# Patient Record
Sex: MALE | Race: WHITE | Employment: OTHER | URBAN - METROPOLITAN AREA
[De-identification: names, ages, dates, MRNs, and addresses within clinical notes are randomized per-mention and may not be internally consistent; named-entity substitution may affect disease eponyms.]

---

## 2021-10-29 ENCOUNTER — APPOINTMENT (OUTPATIENT)
Dept: CT IMAGING | Age: 71
End: 2021-10-29
Payer: MEDICARE

## 2021-10-29 ENCOUNTER — HOSPITAL ENCOUNTER (EMERGENCY)
Age: 71
Discharge: NURSING, REHAB OR LONG-TERM CARE | End: 2021-10-29
Attending: EMERGENCY MEDICINE
Payer: MEDICARE

## 2021-10-29 VITALS
DIASTOLIC BLOOD PRESSURE: 75 MMHG | SYSTOLIC BLOOD PRESSURE: 126 MMHG | HEART RATE: 70 BPM | WEIGHT: 200 LBS | BODY MASS INDEX: 27.89 KG/M2 | TEMPERATURE: 97.3 F | OXYGEN SATURATION: 98 % | RESPIRATION RATE: 15 BRPM

## 2021-10-29 DIAGNOSIS — K59.00 CONSTIPATION, UNSPECIFIED CONSTIPATION TYPE: Primary | ICD-10-CM

## 2021-10-29 LAB
ALBUMIN SERPL-MCNC: 3.4 G/DL (ref 3.5–5.2)
ALP BLD-CCNC: 135 U/L (ref 40–129)
ALT SERPL-CCNC: 21 U/L (ref 0–40)
ANION GAP SERPL CALCULATED.3IONS-SCNC: 10 MMOL/L (ref 7–16)
AST SERPL-CCNC: 38 U/L (ref 0–39)
BASOPHILS ABSOLUTE: 0.05 E9/L (ref 0–0.2)
BASOPHILS RELATIVE PERCENT: 0.6 % (ref 0–2)
BILIRUB SERPL-MCNC: 0.8 MG/DL (ref 0–1.2)
BUN BLDV-MCNC: 21 MG/DL (ref 6–23)
CALCIUM SERPL-MCNC: 8.9 MG/DL (ref 8.6–10.2)
CHLORIDE BLD-SCNC: 107 MMOL/L (ref 98–107)
CO2: 25 MMOL/L (ref 22–29)
CREAT SERPL-MCNC: 1.2 MG/DL (ref 0.7–1.2)
EOSINOPHILS ABSOLUTE: 0.46 E9/L (ref 0.05–0.5)
EOSINOPHILS RELATIVE PERCENT: 5.7 % (ref 0–6)
GFR AFRICAN AMERICAN: >60
GFR NON-AFRICAN AMERICAN: 60 ML/MIN/1.73
GLUCOSE BLD-MCNC: 156 MG/DL (ref 74–99)
HCT VFR BLD CALC: 38.6 % (ref 37–54)
HEMOGLOBIN: 12.4 G/DL (ref 12.5–16.5)
IMMATURE GRANULOCYTES #: 0.04 E9/L
IMMATURE GRANULOCYTES %: 0.5 % (ref 0–5)
LACTIC ACID: 1.8 MMOL/L (ref 0.5–2.2)
LYMPHOCYTES ABSOLUTE: 1.55 E9/L (ref 1.5–4)
LYMPHOCYTES RELATIVE PERCENT: 19 % (ref 20–42)
MCH RBC QN AUTO: 30.6 PG (ref 26–35)
MCHC RBC AUTO-ENTMCNC: 32.1 % (ref 32–34.5)
MCV RBC AUTO: 95.3 FL (ref 80–99.9)
MONOCYTES ABSOLUTE: 0.87 E9/L (ref 0.1–0.95)
MONOCYTES RELATIVE PERCENT: 10.7 % (ref 2–12)
NEUTROPHILS ABSOLUTE: 5.17 E9/L (ref 1.8–7.3)
NEUTROPHILS RELATIVE PERCENT: 63.5 % (ref 43–80)
PDW BLD-RTO: 13.9 FL (ref 11.5–15)
PLATELET # BLD: 207 E9/L (ref 130–450)
PMV BLD AUTO: 12.2 FL (ref 7–12)
POTASSIUM SERPL-SCNC: 4 MMOL/L (ref 3.5–5)
RBC # BLD: 4.05 E12/L (ref 3.8–5.8)
SODIUM BLD-SCNC: 142 MMOL/L (ref 132–146)
TOTAL PROTEIN: 6.1 G/DL (ref 6.4–8.3)
WBC # BLD: 8.1 E9/L (ref 4.5–11.5)

## 2021-10-29 PROCEDURE — 85025 COMPLETE CBC W/AUTO DIFF WBC: CPT

## 2021-10-29 PROCEDURE — 6360000004 HC RX CONTRAST MEDICATION: Performed by: RADIOLOGY

## 2021-10-29 PROCEDURE — 99284 EMERGENCY DEPT VISIT MOD MDM: CPT

## 2021-10-29 PROCEDURE — 74177 CT ABD & PELVIS W/CONTRAST: CPT

## 2021-10-29 PROCEDURE — 80053 COMPREHEN METABOLIC PANEL: CPT

## 2021-10-29 PROCEDURE — 2580000003 HC RX 258: Performed by: EMERGENCY MEDICINE

## 2021-10-29 PROCEDURE — 83605 ASSAY OF LACTIC ACID: CPT

## 2021-10-29 RX ORDER — POLYETHYLENE GLYCOL 3350 17 G/17G
17 POWDER, FOR SOLUTION ORAL 2 TIMES DAILY PRN
Qty: 527 G | Refills: 0 | Status: SHIPPED | OUTPATIENT
Start: 2021-10-29 | End: 2021-11-28

## 2021-10-29 RX ORDER — 0.9 % SODIUM CHLORIDE 0.9 %
1000 INTRAVENOUS SOLUTION INTRAVENOUS ONCE
Status: COMPLETED | OUTPATIENT
Start: 2021-10-29 | End: 2021-10-29

## 2021-10-29 RX ADMIN — IOHEXOL 50 ML: 240 INJECTION, SOLUTION INTRATHECAL; INTRAVASCULAR; INTRAVENOUS; ORAL at 11:40

## 2021-10-29 RX ADMIN — SODIUM CHLORIDE 1000 ML: 9 INJECTION, SOLUTION INTRAVENOUS at 12:27

## 2021-10-29 RX ADMIN — IOPAMIDOL 75 ML: 755 INJECTION, SOLUTION INTRAVENOUS at 11:40

## 2021-10-29 ASSESSMENT — ENCOUNTER SYMPTOMS
COUGH: 0
NAUSEA: 0
ABDOMINAL DISTENTION: 0
CHEST TIGHTNESS: 0
SORE THROAT: 0
ABDOMINAL PAIN: 1
CONSTIPATION: 1
DIARRHEA: 0
SHORTNESS OF BREATH: 0
WHEEZING: 0
BACK PAIN: 0
VOMITING: 0

## 2021-10-29 NOTE — ED NOTES
Pt was disempacted for very large amount  Hard stool post SSE per order Dr Carole Lockwood pt tolerated well some lisa blood on outside stool     [facility]  10/29/21 9611

## 2021-10-29 NOTE — ED NOTES
Pt awaits transfer to assist living no condition changes pt denies complaint, po snack and gatorade offered     [facility], [address] [facility]  10/29/21 5422

## 2021-10-29 NOTE — ED NOTES
Pt discharged by charge staff Nursing facility called with report     Mariana Wyman RN  10/29/21 1936

## 2021-10-29 NOTE — ED NOTES
Moved to rm 15 positioned left side  Ml instilled     Rj Rivera [facility]  10/29/21 3619

## 2021-10-29 NOTE — ED PROVIDER NOTES
Chief complaint:  Constipation    HPI history provided by the patient and report  Patient presents her complaining of 4 days of constipation with mild abdominal cramping and pain. No nausea vomiting or diarrhea. No fevers, sweats or chills. No hematuria dysuria. No back or flank pain. No chest pain, palpitations or shortness of breath. No treatment prior to arrival.  Nothing makes it better or worse. Review of Systems   Constitutional: Negative for chills, diaphoresis, fatigue and fever. HENT: Negative for congestion and sore throat. Respiratory: Negative for cough, chest tightness, shortness of breath and wheezing. Cardiovascular: Negative for chest pain, palpitations and leg swelling. Gastrointestinal: Positive for abdominal pain and constipation. Negative for abdominal distention, diarrhea, nausea and vomiting. Genitourinary: Negative for dysuria, flank pain, frequency and urgency. Musculoskeletal: Negative for arthralgias, back pain, gait problem, joint swelling, myalgias, neck pain and neck stiffness. Skin: Negative for rash and wound. Neurological: Negative for dizziness, seizures, syncope, weakness, light-headedness, numbness and headaches. Hematological: Negative for adenopathy. All other systems reviewed and are negative. Physical Exam  Vitals and nursing note reviewed. Constitutional:       General: He is not in acute distress. Appearance: He is well-developed. He is not ill-appearing, toxic-appearing or diaphoretic. HENT:      Head: Normocephalic and atraumatic. Eyes:      General: No scleral icterus. Pupils: Pupils are equal, round, and reactive to light. Cardiovascular:      Rate and Rhythm: Normal rate and regular rhythm. Heart sounds: Normal heart sounds. No murmur heard. Pulmonary:      Effort: Pulmonary effort is normal. No respiratory distress. Breath sounds: Normal breath sounds.  No stridor, decreased air movement or transmitted upper airway sounds. No decreased breath sounds, wheezing, rhonchi or rales. Chest:      Chest wall: No tenderness. Abdominal:      General: Bowel sounds are normal. There is no distension. Palpations: Abdomen is soft. Tenderness: There is no abdominal tenderness. There is no right CVA tenderness, left CVA tenderness, guarding or rebound. Comments: Soft, slightly protuberant with no gross distention. Mild general tenderness on deep palpation with no guarding, rebound tenderness or rigidity. Very minimal findings. Musculoskeletal:         General: No swelling, tenderness, deformity or signs of injury. Cervical back: Normal range of motion and neck supple. Right lower leg: No edema. Left lower leg: No edema. Skin:     General: Skin is warm and dry. Coloration: Skin is not cyanotic, jaundiced, mottled or pale. Findings: No erythema or rash. Neurological:      General: No focal deficit present. Mental Status: He is alert and oriented to person, place, and time. GCS: GCS eye subscore is 4. GCS verbal subscore is 5. GCS motor subscore is 6. Cranial Nerves: No cranial nerve deficit. Coordination: Coordination normal.          Procedures     MDM     ED Course as of Oct 29 1246   Fri Oct 29, 2021   1244 Patient resting comfortably no distress. Work-up results are discussed. [NC]      ED Course User Index  [NC] Karilyn Sandhoff, DO        ED Course as of Oct 29 1246   Fri Oct 29, 2021   1244 Patient resting comfortably no distress. Work-up results are discussed. [NC]      ED Course User Index  [NC] Carissa Prieto DO       --------------------------------------------- PAST HISTORY ---------------------------------------------  Past Medical History:  has a past medical history of Acquired hypothyroidism, Acute renal failure ([facility] [address] Arthritis, Asthma, History of cardiovascular stress test, and Hypertension.     Past Surgical History:  has a past surgical history that includes hernia repair (2005) and Cholecystectomy (7/17/2015). Social History:  reports that he quit smoking about 11 years ago. He has quit using smokeless tobacco. He reports that he does not drink alcohol and does not use drugs. Family History: family history includes Heart Disease in his mother; Heart Failure in his father and mother. The patients home medications have been reviewed.     Allergies: Penicillins and Sulfur    -------------------------------------------------- RESULTS -------------------------------------------------  Labs:  Results for orders placed or performed during the hospital encounter of 10/29/21   CBC Auto Differential   Result Value Ref Range    WBC 8.1 4.5 - 11.5 E9/L    RBC 4.05 3.80 - 5.80 E12/L    Hemoglobin 12.4 (L) 12.5 - 16.5 g/dL    Hematocrit 38.6 37.0 - 54.0 %    MCV 95.3 80.0 - 99.9 fL    MCH 30.6 26.0 - 35.0 pg    MCHC 32.1 32.0 - 34.5 %    RDW 13.9 11.5 - 15.0 fL    Platelets 190 832 - 348 E9/L    MPV 12.2 (H) 7.0 - 12.0 fL    Neutrophils % 63.5 43.0 - 80.0 %    Immature Granulocytes % 0.5 0.0 - 5.0 %    Lymphocytes % 19.0 (L) 20.0 - 42.0 %    Monocytes % 10.7 2.0 - 12.0 %    Eosinophils % 5.7 0.0 - 6.0 %    Basophils % 0.6 0.0 - 2.0 %    Neutrophils Absolute 5.17 1.80 - 7.30 E9/L    Immature Granulocytes # 0.04 E9/L    Lymphocytes Absolute 1.55 1.50 - 4.00 E9/L    Monocytes Absolute 0.87 0.10 - 0.95 E9/L    Eosinophils Absolute 0.46 0.05 - 0.50 E9/L    Basophils Absolute 0.05 0.00 - 0.20 E9/L   Comprehensive Metabolic Panel   Result Value Ref Range    Sodium 142 132 - 146 mmol/L    Potassium 4.0 3.5 - 5.0 mmol/L    Chloride 107 98 - 107 mmol/L    CO2 25 22 - 29 mmol/L    Anion Gap 10 7 - 16 mmol/L    Glucose 156 (H) 74 - 99 mg/dL    BUN 21 6 - 23 mg/dL    CREATININE 1.2 0.7 - 1.2 mg/dL    GFR Non-African American 60 >=60 mL/min/1.73    GFR African American >60     Calcium 8.9 8.6 - 10.2 mg/dL    Total Protein 6.1 (L) 6.4 - 8.3 g/dL hospitalization or inpatient management. They have remained hemodynamically stable throughout their entire ED visit and are stable for discharge with outpatient follow-up. The plan has been discussed in detail and they are aware of the specific conditions for emergent return, as well as the importance of follow-up. New Prescriptions    POLYETHYLENE GLYCOL (MIRALAX) 17 G PACKET    Take 17 g by mouth 2 times daily as needed for Constipation       Diagnosis:  1. Constipation, unspecified constipation type        Disposition:  Patient's disposition: Discharge to home  Patient's condition is stable.          [address] [facility], DO  10/29/21 1246

## 2021-11-02 ENCOUNTER — HOSPITAL ENCOUNTER (EMERGENCY)
Age: 71
Discharge: HOME | End: 2021-11-02
Attending: EMERGENCY MEDICINE
Payer: MEDICARE

## 2021-11-02 ENCOUNTER — APPOINTMENT (OUTPATIENT)
Dept: GENERAL RADIOLOGY | Age: 71
End: 2021-11-02
Payer: MEDICARE

## 2021-11-02 VITALS
RESPIRATION RATE: 18 BRPM | HEIGHT: 71 IN | SYSTOLIC BLOOD PRESSURE: 115 MMHG | OXYGEN SATURATION: 98 % | WEIGHT: 200 LBS | HEART RATE: 64 BPM | BODY MASS INDEX: 28 KG/M2 | DIASTOLIC BLOOD PRESSURE: 51 MMHG | TEMPERATURE: 97.9 F

## 2021-11-02 DIAGNOSIS — R55 NEAR SYNCOPE: Primary | ICD-10-CM

## 2021-11-02 LAB
ANION GAP SERPL CALCULATED.3IONS-SCNC: 8 MMOL/L (ref 7–16)
BUN BLDV-MCNC: 24 MG/DL (ref 6–23)
CALCIUM SERPL-MCNC: 8.8 MG/DL (ref 8.6–10.2)
CHLORIDE BLD-SCNC: 107 MMOL/L (ref 98–107)
CO2: 25 MMOL/L (ref 22–29)
CREAT SERPL-MCNC: 1.7 MG/DL (ref 0.7–1.2)
EKG ATRIAL RATE: 61 BPM
EKG P AXIS: 45 DEGREES
EKG P-R INTERVAL: 244 MS
EKG Q-T INTERVAL: 466 MS
EKG QRS DURATION: 78 MS
EKG QTC CALCULATION (BAZETT): 469 MS
EKG R AXIS: 2 DEGREES
EKG T AXIS: 49 DEGREES
EKG VENTRICULAR RATE: 61 BPM
GFR AFRICAN AMERICAN: 48
GFR NON-AFRICAN AMERICAN: 40 ML/MIN/1.73
GLUCOSE BLD-MCNC: 128 MG/DL (ref 74–99)
HCT VFR BLD CALC: 34.3 % (ref 37–54)
HEMOGLOBIN: 11.2 G/DL (ref 12.5–16.5)
MAGNESIUM: 2 MG/DL (ref 1.6–2.6)
MCH RBC QN AUTO: 30.9 PG (ref 26–35)
MCHC RBC AUTO-ENTMCNC: 32.7 % (ref 32–34.5)
MCV RBC AUTO: 94.8 FL (ref 80–99.9)
PDW BLD-RTO: 13.8 FL (ref 11.5–15)
PLATELET # BLD: 271 E9/L (ref 130–450)
PMV BLD AUTO: 12.5 FL (ref 7–12)
POTASSIUM SERPL-SCNC: 4.5 MMOL/L (ref 3.5–5)
RBC # BLD: 3.62 E12/L (ref 3.8–5.8)
SODIUM BLD-SCNC: 140 MMOL/L (ref 132–146)
TROPONIN, HIGH SENSITIVITY: 37 NG/L (ref 0–11)
TSH SERPL DL<=0.05 MIU/L-ACNC: 6.52 UIU/ML (ref 0.27–4.2)
WBC # BLD: 14.5 E9/L (ref 4.5–11.5)

## 2021-11-02 PROCEDURE — 93010 ELECTROCARDIOGRAM REPORT: CPT | Performed by: INTERNAL MEDICINE

## 2021-11-02 PROCEDURE — 84443 ASSAY THYROID STIM HORMONE: CPT

## 2021-11-02 PROCEDURE — 84484 ASSAY OF TROPONIN QUANT: CPT

## 2021-11-02 PROCEDURE — 99284 EMERGENCY DEPT VISIT MOD MDM: CPT

## 2021-11-02 PROCEDURE — 36415 COLL VENOUS BLD VENIPUNCTURE: CPT

## 2021-11-02 PROCEDURE — 2580000003 HC RX 258: Performed by: EMERGENCY MEDICINE

## 2021-11-02 PROCEDURE — 80048 BASIC METABOLIC PNL TOTAL CA: CPT

## 2021-11-02 PROCEDURE — 71045 X-RAY EXAM CHEST 1 VIEW: CPT

## 2021-11-02 PROCEDURE — 83735 ASSAY OF MAGNESIUM: CPT

## 2021-11-02 PROCEDURE — 93005 ELECTROCARDIOGRAM TRACING: CPT | Performed by: EMERGENCY MEDICINE

## 2021-11-02 PROCEDURE — 85027 COMPLETE CBC AUTOMATED: CPT

## 2021-11-02 RX ORDER — SODIUM CHLORIDE 0.9 % (FLUSH) 0.9 %
10 SYRINGE (ML) INJECTION PRN
Status: DISCONTINUED | OUTPATIENT
Start: 2021-11-02 | End: 2021-11-02 | Stop reason: HOSPADM

## 2021-11-02 RX ORDER — 0.9 % SODIUM CHLORIDE 0.9 %
1000 INTRAVENOUS SOLUTION INTRAVENOUS ONCE
Status: COMPLETED | OUTPATIENT
Start: 2021-11-02 | End: 2021-11-02

## 2021-11-02 RX ADMIN — SODIUM CHLORIDE 1000 ML: 9 INJECTION, SOLUTION INTRAVENOUS at 12:30

## 2021-11-02 ASSESSMENT — ENCOUNTER SYMPTOMS
SHORTNESS OF BREATH: 0
WHEEZING: 0
VOMITING: 0
COUGH: 0
DIARRHEA: 0
SORE THROAT: 0
EYE REDNESS: 0
EYE DISCHARGE: 0
SINUS PRESSURE: 0
EYE PAIN: 0
VISUAL CHANGE: 0
ABDOMINAL PAIN: 0
BACK PAIN: 0
NAUSEA: 0

## 2021-11-02 NOTE — ED PROVIDER NOTES
Head: Normocephalic and atraumatic. Eyes:      Pupils: Pupils are equal, round, and reactive to light. Cardiovascular:      Rate and Rhythm: Regular rhythm. Bradycardia present. Heart sounds: Normal heart sounds. No murmur heard. Pulmonary:      Effort: Pulmonary effort is normal. No respiratory distress. Breath sounds: Normal breath sounds. No wheezing or rales. Abdominal:      General: Bowel sounds are normal.      Palpations: Abdomen is soft. Tenderness: There is no abdominal tenderness. There is no guarding or rebound. Musculoskeletal:         General: No tenderness. Cervical back: Normal range of motion and neck supple. Comments: Some lower extremity muscle wasting noted. Skin:     General: Skin is warm and dry. Neurological:      Mental Status: He is alert and oriented to person, place, and time. Cranial Nerves: No cranial nerve deficit. Coordination: Coordination normal.          Procedures     MDM     EKG: This EKG is signed and interpreted by me. Rate: 61  Rhythm: Sinus  Interpretation: no acute changes, non-specific EKG, 1st degree AV block and nonacute anterior infarct with Q waves noted. Comparison: changes compared to previous EKG of 9/3/2017. Anterior infarct appears new since that time. --------------------------------------------- PAST HISTORY ---------------------------------------------  Past Medical History:  has a past medical history of Acquired hypothyroidism, Acute renal failure ([facility] [address] Arthritis, Asthma, History of cardiovascular stress test, and Hypertension. Past Surgical History:  has a past surgical history that includes hernia repair (2005) and Cholecystectomy (7/17/2015). Social History:  reports that he quit smoking about 11 years ago. He has quit using smokeless tobacco. He reports that he does not drink alcohol and does not use drugs.     Family History: family history includes Heart Disease in his mother; Heart Failure in his father and mother. The patients home medications have been reviewed. Allergies: Penicillins and Sulfur    -------------------------------------------------- RESULTS -------------------------------------------------  Labs:  Results for orders placed or performed during the hospital encounter of 22/26/58   Basic metabolic panel   Result Value Ref Range    Sodium 140 132 - 146 mmol/L    Potassium 4.5 3.5 - 5.0 mmol/L    Chloride 107 98 - 107 mmol/L    CO2 25 22 - 29 mmol/L    Anion Gap 8 7 - 16 mmol/L    Glucose 128 (H) 74 - 99 mg/dL    BUN 24 (H) 6 - 23 mg/dL    CREATININE 1.7 (H) 0.7 - 1.2 mg/dL    GFR Non-African American 40 >=60 mL/min/1.73    GFR African American 48     Calcium 8.8 8.6 - 10.2 mg/dL   CBC   Result Value Ref Range    WBC 14.5 (H) 4.5 - 11.5 E9/L    RBC 3.62 (L) 3.80 - 5.80 E12/L    Hemoglobin 11.2 (L) 12.5 - 16.5 g/dL    Hematocrit 34.3 (L) 37.0 - 54.0 %    MCV 94.8 80.0 - 99.9 fL    MCH 30.9 26.0 - 35.0 pg    MCHC 32.7 32.0 - 34.5 %    RDW 13.8 11.5 - 15.0 fL    Platelets 557 721 - 473 E9/L    MPV 12.5 (H) 7.0 - 12.0 fL   Troponin   Result Value Ref Range    Troponin, High Sensitivity 37 (H) 0 - 11 ng/L   Magnesium   Result Value Ref Range    Magnesium 2.0 1.6 - 2.6 mg/dL   TSH without Reflex   Result Value Ref Range    TSH 6.520 (H) 0.270 - 4.200 uIU/mL   EKG 12 Lead   Result Value Ref Range    Ventricular Rate 61 BPM    Atrial Rate 61 BPM    P-R Interval 244 ms    QRS Duration 78 ms    Q-T Interval 466 ms    QTc Calculation (Bazett) 469 ms    P Axis 45 degrees    R Axis 2 degrees    T Axis 49 degrees       Radiology:  XR CHEST PORTABLE   Final Result   No acute cardiopulmonary disease in the visualized chest.             ------------------------- NURSING NOTES AND VITALS REVIEWED ---------------------------  Date / Time Roomed:  11/2/2021 11:57 AM  ED Bed Assignment:  03/03    The nursing notes within the ED encounter and vital signs as below have been reviewed.    BP (!) 115/51   Pulse 64   Temp 97.9 °F (36.6 °C) (Oral)   Resp 18   Ht 5' 11\" (1.803 m)   Wt 200 lb (90.7 kg)   SpO2 98%   BMI 27.89 kg/m²   Oxygen Saturation Interpretation: Normal      ------------------------------------------ PROGRESS NOTES ------------------------------------------  1:26 PM EDT  Into discussed results. Patient sleeping. Easily awakens. He relates that he is anxious for discharge. He remains asymptomatic. I have spoken with the patient and discussed todays results, in addition to providing specific details for the plan of care and counseling regarding the diagnosis and prognosis. Their questions are answered at this time and they are agreeable with the plan. I discussed at length with them reasons for immediate return here for re evaluation. They will followup with their primary care physician by calling their office tomorrow. --------------------------------- ADDITIONAL PROVIDER NOTES ---------------------------------  At this time the patient is without objective evidence of an acute process requiring hospitalization or inpatient management. They have remained hemodynamically stable throughout their entire ED visit and are stable for discharge with outpatient follow-up. The plan has been discussed in detail and they are aware of the specific conditions for emergent return, as well as the importance of follow-up. New Prescriptions    No medications on file       Diagnosis:  1. Near syncope        Disposition:  Patient's disposition: Discharge to home  Patient's condition is stable.        Marlene Irwin DO  11/02/21 9870

## 2022-03-11 ENCOUNTER — HOSPITAL ENCOUNTER (OUTPATIENT)
Dept: CT IMAGING | Age: 72
Discharge: HOME | End: 2022-03-11
Payer: MEDICARE

## 2022-03-11 DIAGNOSIS — I69.351 HEMIPARESIS AFFECTING RIGHT SIDE AS LATE EFFECT OF CEREBROVASCULAR ACCIDENT (HCC): ICD-10-CM

## 2022-03-11 DIAGNOSIS — I63.312 CEREBRAL INFARCTION DUE TO THROMBOSIS OF LEFT MIDDLE CEREBRAL ARTERY (HCC): ICD-10-CM

## 2022-03-11 PROCEDURE — 70450 CT HEAD/BRAIN W/O DYE: CPT

## 2022-06-21 ENCOUNTER — HOSPITAL ENCOUNTER (INPATIENT)
Age: 72
LOS: 2 days | Discharge: NURSING, REHAB OR LONG-TERM CARE | DRG: 699 | End: 2022-06-24
Attending: STUDENT IN AN ORGANIZED HEALTH CARE EDUCATION/TRAINING PROGRAM | Admitting: INTERNAL MEDICINE
Payer: MEDICARE

## 2022-06-21 ENCOUNTER — APPOINTMENT (OUTPATIENT)
Dept: CT IMAGING | Age: 72
DRG: 699 | End: 2022-06-21
Payer: MEDICARE

## 2022-06-21 DIAGNOSIS — I10 ESSENTIAL HYPERTENSION: ICD-10-CM

## 2022-06-21 DIAGNOSIS — Z79.01 CHRONIC ANTICOAGULATION: ICD-10-CM

## 2022-06-21 DIAGNOSIS — R31.9 HEMATURIA, UNSPECIFIED TYPE: ICD-10-CM

## 2022-06-21 DIAGNOSIS — D64.9 ANEMIA, UNSPECIFIED TYPE: Primary | ICD-10-CM

## 2022-06-21 LAB
ANION GAP SERPL CALCULATED.3IONS-SCNC: 6 MMOL/L (ref 7–16)
BASOPHILS ABSOLUTE: 0.02 E9/L (ref 0–0.2)
BASOPHILS RELATIVE PERCENT: 0.2 % (ref 0–2)
BUN BLDV-MCNC: 32 MG/DL (ref 6–23)
CALCIUM SERPL-MCNC: 8.7 MG/DL (ref 8.6–10.2)
CHLORIDE BLD-SCNC: 101 MMOL/L (ref 98–107)
CO2: 28 MMOL/L (ref 22–29)
CREAT SERPL-MCNC: 1.2 MG/DL (ref 0.7–1.2)
EOSINOPHILS ABSOLUTE: 0.22 E9/L (ref 0.05–0.5)
EOSINOPHILS RELATIVE PERCENT: 2.3 % (ref 0–6)
GFR AFRICAN AMERICAN: >60
GFR NON-AFRICAN AMERICAN: 60 ML/MIN/1.73
GLUCOSE BLD-MCNC: 97 MG/DL (ref 74–99)
HCT VFR BLD CALC: 27.9 % (ref 37–54)
HEMOGLOBIN: 8.8 G/DL (ref 12.5–16.5)
IMMATURE GRANULOCYTES #: 0.05 E9/L
IMMATURE GRANULOCYTES %: 0.5 % (ref 0–5)
LYMPHOCYTES ABSOLUTE: 1.41 E9/L (ref 1.5–4)
LYMPHOCYTES RELATIVE PERCENT: 14.7 % (ref 20–42)
MCH RBC QN AUTO: 29.3 PG (ref 26–35)
MCHC RBC AUTO-ENTMCNC: 31.5 % (ref 32–34.5)
MCV RBC AUTO: 93 FL (ref 80–99.9)
MONOCYTES ABSOLUTE: 1.4 E9/L (ref 0.1–0.95)
MONOCYTES RELATIVE PERCENT: 14.6 % (ref 2–12)
NEUTROPHILS ABSOLUTE: 6.52 E9/L (ref 1.8–7.3)
NEUTROPHILS RELATIVE PERCENT: 67.7 % (ref 43–80)
PDW BLD-RTO: 14.4 FL (ref 11.5–15)
PLATELET # BLD: 176 E9/L (ref 130–450)
PMV BLD AUTO: 12.6 FL (ref 7–12)
POTASSIUM REFLEX MAGNESIUM: 5.3 MMOL/L (ref 3.5–5)
RBC # BLD: 3 E12/L (ref 3.8–5.8)
SODIUM BLD-SCNC: 135 MMOL/L (ref 132–146)
WBC # BLD: 9.6 E9/L (ref 4.5–11.5)

## 2022-06-21 PROCEDURE — 80048 BASIC METABOLIC PNL TOTAL CA: CPT

## 2022-06-21 PROCEDURE — 51702 INSERT TEMP BLADDER CATH: CPT

## 2022-06-21 PROCEDURE — 99285 EMERGENCY DEPT VISIT HI MDM: CPT

## 2022-06-21 PROCEDURE — 74176 CT ABD & PELVIS W/O CONTRAST: CPT

## 2022-06-21 PROCEDURE — 85025 COMPLETE CBC W/AUTO DIFF WBC: CPT

## 2022-06-21 ASSESSMENT — ENCOUNTER SYMPTOMS
DIARRHEA: 0
SHORTNESS OF BREATH: 0
BACK PAIN: 0
ABDOMINAL PAIN: 0
COUGH: 0
NAUSEA: 0
SORE THROAT: 0
EYE REDNESS: 0
WHEEZING: 0
SINUS PAIN: 0
EYE PAIN: 0
VOMITING: 0

## 2022-06-21 ASSESSMENT — PAIN - FUNCTIONAL ASSESSMENT: PAIN_FUNCTIONAL_ASSESSMENT: NONE - DENIES PAIN

## 2022-06-21 NOTE — Clinical Note
Discharge Plan[de-identified] Other/[facility])   Telemetry/Cardiac Monitoring Required?: No

## 2022-06-21 NOTE — LETTER
[place] Department Medicaid  CERTIFICATION OF NECESSITY  FOR NON-EMERGENCY TRANSPORTATION   BY GROUND AMBULANCE      Individual Information   1. Name: Amie Beebe 2. [place] Medicaid Billing Number:   3. Address: [place] [address]      Transportation Provider Information   4. Provider Name: [facility]   5. [place] Medicaid Provider Number:  National Provider Identifier (NPI):      Certification  7. Criteria:  During transport, this individual requires:  [x] Medical treatment or continuous     supervision by an EMT. [] The administration or regulation of oxygen by another person. [] Supervised protective restraint. 8. Period Beginning Date: 6/24/22   9. Length  [x] Not more than 3 day(s)  [] One Year     Additional Information Relevant to Certification   10. Comments or Explanations, If Necessary or Appropriate     Dependent x2, Inguinal hernia, History of stroke     Certifying Practitioner Information   11. Name of Practitioner: Dr Kecia Mix   12. [place] Medicaid Provider Number, If Applicable:  [address] Provider Identifier (NPI):      Signature Information   14. Date of Signature: 6/24/22 15. Name of Person Signing: Carl Witt    12. Signature and Professional Designation: Electronically signed by NIK Nicolas on 6/24/2022 at 1:55 PM       [facility] [address]  Rev. 7/2015    [facility] Date/Time: 6/21/2022 Nishant Mcfarland6 Account: [de-identified]    MRN: [id number]    Patient: Amie Beebe    Contact Serial #: [id number]      ENCOUNTER          Patient Class: I Private Enc?   No Unit RM BD: [facility] 6481/[id number]   Hospital Service: MED   Encounter DX: Chronic anticoagulation *   ADM Provider: Johnny Vela MD   Procedure:     ATT Provider: Neno Grider DO   REF Provider:        Admission DX: Chronic anticoagulation, Hematuria, Anemia, unspecified type, Hematuria, unspecified type and DX codes: Z79.01, R31.9, D64.9, R31.9      PATIENT                 Name: Suzanne Anaya : 1950 (80 yrs)   Address: [address] Sex: Male   City: [place] [address]         Marital Status: Single   Employer: RETIRED         Episcopal: [employer]   Primary Care Provider: Franchesca Castro MD         Primary Phone: [phone] [address]   Contact Name Legal Guardian? Relationship to Patient Home Phone Work Phone   1. Alejandro Partida  2. Fernando Voss No  No Other  Other [phone]              GUARANTOR            Guarantor: Ria Stone     : 1950   Address: [facility] Sex: [place]     [place] [address]     Relation to Patient: Self       Home Phone: [phone]   Guarantor ID: [id number]       Work Phone:     Guarantor Employer: RETIRED         Status: RETIRED      COVERAGE  PRIMARY INSURANCE   Payor: [employer] Plan: [employer]   Payor Address: [address],  [facility], [address] [place] [address]       Group Number:   Insurance Type: INDEMNITY   Subscriber Name: Deny Martin : 1950   Subscriber ID: [id number] Pat. Rel. to Sub: Self   SECONDARY INSURANCE   Payor: [address] Address:  [address], [address], [address] [place] [address]          Group Number: [id number] Type: INDEMNITY   Subscriber Name: Deny Martin : 1950   Subscriber ID: [id number] Pat.  Rel. to Sub: SELF           CSN: [id number]

## 2022-06-21 NOTE — ED PROVIDER NOTES
Chief Complaint   Patient presents with    Other     cath was pulled out two days ago with clotting and now just noticed by nursing hme        67-year-old gentleman with past medical history of hypertension, asthma, acute renal failure with chronic Devine catheter in place presenting today after his catheter was pulled out at the nursing facility. He states that he pulled it out yesterday, nothing made it better or worse, he is not sure why he did not, not associated with abdominal pain, penile pain, any other abnormal discharge. He is not experiencing nausea, vomiting, fever, chills, chest pain, shortness of breath. Nursing facility just realized today that it had been pulled out and sent to the emergency department for further evaluation. No other acute complaints. Review of Systems   Constitutional: Negative for chills and fever. HENT: Negative for ear pain, sinus pain and sore throat. Eyes: Negative for pain and redness. Respiratory: Negative for cough, shortness of breath and wheezing. Cardiovascular: Negative for chest pain. Gastrointestinal: Negative for abdominal pain, diarrhea, nausea and vomiting. Genitourinary: Negative for dysuria and flank pain. Devine catheter problem   Musculoskeletal: Negative for back pain and neck pain. Skin: Negative for rash. Neurological: Negative for seizures and headaches. Hematological: Negative for adenopathy. All other systems reviewed and are negative. Physical Exam  Vitals and nursing note reviewed. Constitutional:       General: He is not in acute distress. Appearance: He is well-developed. HENT:      Head: Normocephalic and atraumatic. Right Ear: External ear normal.      Left Ear: External ear normal.      Mouth/Throat:      Mouth: Mucous membranes are moist.      Pharynx: Oropharynx is clear. Eyes:      Pupils: Pupils are equal, round, and reactive to light.    Cardiovascular:      Rate and Rhythm: Normal rate and regular rhythm. Heart sounds: Normal heart sounds. No murmur heard. Pulmonary:      Effort: Pulmonary effort is normal.      Breath sounds: Normal breath sounds. Abdominal:      General: Bowel sounds are normal.      Palpations: Abdomen is soft. Tenderness: There is no abdominal tenderness. There is no guarding or rebound. Genitourinary:     Comments: Penile bleeding secondary to traumatic Devine catheter removal area intact  Musculoskeletal:         General: No tenderness. Cervical back: Normal range of motion and neck supple. Skin:     General: Skin is warm and dry. Neurological:      General: No focal deficit present. Mental Status: He is alert and oriented to person, place, and time. Procedures       MDM  Number of Diagnoses or Management Options  Anemia, unspecified type  Chronic anticoagulation  Hematuria, unspecified type  Diagnosis management comments: 36-year-old gentleman with past medical history of hypertension, asthma, acute renal failure with chronic Devine catheter in place presents today after his catheter was pulled out at the nursing facility. On arrival to emergency department he is hemodynamically stable. Area was cleansed and catheter was replaced, urinalysis obtained in addition to basic blood work. No abnormalities noted. CT scan demonstrated bladder herniation into inguinal region, on discussion with nursing home and patient's increasing fatigue, will be admitted for further evaluation management. Dr. Surinder Leon will admit the patient. ED Course as of 06/22/22 1036   Tue Jun 21, 2022   2131 Difficulty passing catheter, increasing swelling suprapubically. Will obtain CT abdomen/pelvis. [MM]   Wed Jun 22, 2022   0019 Spoke with staff from facility  Notes recently gone downhill recently; confused at times.    Dysphagia s/p CVA, CKD, chronic kidney disease, major depressive disorder, a. Fib, BPH, CAD, CHF, HTN.     [BB]   0022 The patient is on ASA, Xarelto 20mg. [BB]   [id number] Spoke with Dr. Cl Casarez, discussed the patient. He will admit the patient [BB]   0105 Peripheral Line Placement Procedure Note    Indication: Poor peripheral access, lack of vascular access    Consent: The patient provided verbal consent for this procedure. Procedure: The patient was positioned appropriately and the skin over the left arm was prepped with Chloraprep. A tourniquet was placed proximal to the vein's location and ultrasound was utilized to identify the vein. A 20 gauge angiocath was inserted into the vein with ultrasound guidance, with the catheter being visualized as it was advanced within the vein. Blood was obtained from the venous access site if needed and the site was secured with a leur lock and a tegaderm adhesive bandage. The patient tolerated the procedure well. Complications: None    Procedure performed by Lawrence Almonte at 6/22/2022 at 1:05 AM   [BB]      ED Course User Index  [BB] Lawrence Almonte DO  [MM] Meredith Mccabe DO        ED Course as of 06/22/22 1036   Tue Jun 21, 2022   2131 Difficulty passing catheter, increasing swelling suprapubically. Will obtain CT abdomen/pelvis. [MM]   Wed Jun 22, 2022   0019 Spoke with staff from facility  Notes recently gone downhill recently; confused at times. Dysphagia s/p CVA, CKD, chronic kidney disease, major depressive disorder, a. Fib, BPH, CAD, CHF, HTN.     [BB]   0022 The patient is on ASA, Xarelto 20mg. [BB]   [id number] Spoke with Dr. Cl Casarez, discussed the patient. He will admit the patient [BB]   0105 Peripheral Line Placement Procedure Note    Indication: Poor peripheral access, lack of vascular access    Consent: The patient provided verbal consent for this procedure. Procedure: The patient was positioned appropriately and the skin over the left arm was prepped with Chloraprep. A tourniquet was placed proximal to the vein's location and ultrasound was utilized to identify the vein.  A 20 gauge angiocath was inserted into the vein with ultrasound guidance, with the catheter being visualized as it was advanced within the vein. Blood was obtained from the venous access site if needed and the site was secured with a leur lock and a tegaderm adhesive bandage. The patient tolerated the procedure well. Complications: None    Procedure performed by Elieser Linton at 6/22/2022 at 1:05 AM   [BB]      ED Course User Index  [BB] Elieser Linton   [MM] Annmarie Jules DO       --------------------------------------------- PAST HISTORY ---------------------------------------------  Past Medical History:  has a past medical history of Acquired hypothyroidism, Acute renal failure ([address] Arthritis, Asthma, History of cardiovascular stress test, and Hypertension. Past Surgical History:  has a past surgical history that includes hernia repair (2005) and Cholecystectomy (7/17/2015). Social History:  reports that he quit smoking about 11 years ago. He has quit using smokeless tobacco. He reports that he does not drink alcohol and does not use drugs. Family History: family history includes Heart Disease in his mother; Heart Failure in his father and mother. The patients home medications have been reviewed.     Allergies: Elemental sulfur and Penicillins    -------------------------------------------------- RESULTS -------------------------------------------------    LABS:  Results for orders placed or performed during the hospital encounter of 06/21/22   Urinalysis with Microscopic   Result Value Ref Range    Color, UA RED (A) Straw/Yellow    Clarity, UA TURBID (A) Clear    Glucose, Ur Negative Negative mg/dL    Bilirubin Urine Negative Negative    Ketones, Urine Negative Negative mg/dL    Specific Gravity, UA 1.020 1.005 - 1.030    Blood, Urine LARGE (A) Negative    pH, UA 7.0 5.0 - 9.0    Protein, UA >=300 (A) Negative mg/dL    Urobilinogen, Urine 0.2 <2.0 E.U./dL    Nitrite, Urine Negative Negative Leukocyte Esterase, Urine MODERATE (A) Negative    WBC, UA 10-20 (A) 0 - 5 /HPF    RBC, UA PACKED 0 - 2 /HPF    Epithelial Cells, UA RARE /HPF    Bacteria, UA MODERATE (A) None Seen /HPF   CBC with Auto Differential   Result Value Ref Range    WBC 9.6 4.5 - 11.5 E9/L    RBC 3.00 (L) 3.80 - 5.80 E12/L    Hemoglobin 8.8 (L) 12.5 - 16.5 g/dL    Hematocrit 27.9 (L) 37.0 - 54.0 %    MCV 93.0 80.0 - 99.9 fL    MCH 29.3 26.0 - 35.0 pg    MCHC 31.5 (L) 32.0 - 34.5 %    RDW 14.4 11.5 - 15.0 fL    Platelets 637 685 - 121 E9/L    MPV 12.6 (H) 7.0 - 12.0 fL    Neutrophils % 67.7 43.0 - 80.0 %    Immature Granulocytes % 0.5 0.0 - 5.0 %    Lymphocytes % 14.7 (L) 20.0 - 42.0 %    Monocytes % 14.6 (H) 2.0 - 12.0 %    Eosinophils % 2.3 0.0 - 6.0 %    Basophils % 0.2 0.0 - 2.0 %    Neutrophils Absolute 6.52 1.80 - 7.30 E9/L    Immature Granulocytes # 0.05 E9/L    Lymphocytes Absolute 1.41 (L) 1.50 - 4.00 E9/L    Monocytes Absolute 1.40 (H) 0.10 - 0.95 E9/L    Eosinophils Absolute 0.22 0.05 - 0.50 E9/L    Basophils Absolute 0.02 0.00 - 0.20 X8/J   Basic Metabolic Panel w/ Reflex to MG   Result Value Ref Range    Sodium 135 132 - 146 mmol/L    Potassium reflex Magnesium 5.3 (H) 3.5 - 5.0 mmol/L    Chloride 101 98 - 107 mmol/L    CO2 28 22 - 29 mmol/L    Anion Gap 6 (L) 7 - 16 mmol/L    Glucose 97 74 - 99 mg/dL    BUN 32 (H) 6 - 23 mg/dL    CREATININE 1.2 0.7 - 1.2 mg/dL    GFR Non-African American 60 >=60 mL/min/1.73    GFR African American >60     Calcium 8.7 8.6 - 10.2 mg/dL   Potassium   Result Value Ref Range    Potassium 4.6 3.5 - 5.0 mmol/L       RADIOLOGY:  CT ABDOMEN PELVIS WO CONTRAST Additional Contrast? None   Final Result   1. The Devine catheter and the balloon terminate in the bladder dome, which is   herniated in the left inguinal canal.  The herniation of the bladder into the   left inguinal canal is chronic and is seen as far back as at least 09/07/2015. 2. Prominent bilateral renal calculi.   No evidence of hydronephrosis. 3. Interval development of calcified consolidations in the dependent aspects   of the lower lobes, which may be due to infectious/inflammatory process or   barium aspiration. 4. Fat containing right inguinal hernia. No evidence of fat strangulation. 5. Severe distal colonic diverticulosis without diverticulitis. 6. Diffuse bony sclerosis of unclear etiology. Clinical correlation is   needed. RECOMMENDATIONS:   Unavailable             ------------------------- NURSING NOTES AND VITALS REVIEWED ---------------------------  Date / Time Roomed:  6/21/2022  6:47 PM  ED Bed Assignment:  [id number]    The nursing notes within the ED encounter and vital signs as below have been reviewed. Patient Vitals for the past 24 hrs:   BP Temp Temp src Pulse Resp SpO2 Height Weight   06/22/22 0845 101/60 98.8 °F (37.1 °C) Oral (!) 103 20 95 % -- --   06/22/22 0256 108/63 98.9 °F (37.2 °C) Oral 94 16 95 % -- --   06/22/22 0018 -- -- -- 89 -- 95 % -- --   06/21/22 2248 118/66 -- -- 85 -- 94 % -- --   06/21/22 2244 118/66 -- -- 86 16 95 % 5' 11\" (1.803 m) 150 lb (68 kg)   06/21/22 1856 121/64 98.1 °F (36.7 °C) -- 86 18 95 % -- --       Oxygen Saturation Interpretation: Normal    ------------------------------------------ PROGRESS NOTES ------------------------------------------  Re-evaluation(s):  Time: 2100  Patients symptoms show no change  Repeat physical examination is not changed    Counseling:  I have spoken with the patient and discussed todays results, in addition to providing specific details for the plan of care and counseling regarding the diagnosis and prognosis. Their questions are answered at this time and they are agreeable with the plan of admission.    --------------------------------- ADDITIONAL PROVIDER NOTES ---------------------------------  Consultations:  Time: 1299. Spoke with Dr. Marisol So.  Discussed case. They will admit the patient.   This patient's ED course included: a personal history and physicial examination, re-evaluation prior to disposition, multiple bedside re-evaluations and complex medical decision making and emergency management    This patient has remained hemodynamically stable during their ED course. Diagnosis:  1. Anemia, unspecified type    2. Chronic anticoagulation    3. Hematuria, unspecified type        Disposition:  Patient's disposition: Admit to med/surg floor  Patient's condition is stable.            Loan Mccloud DO  Resident  06/22/22 1037

## 2022-06-22 PROBLEM — I63.9 CVA (CEREBRAL VASCULAR ACCIDENT) (HCC): Status: ACTIVE | Noted: 2022-06-22

## 2022-06-22 PROBLEM — Z86.73 HISTORY OF STROKE: Status: ACTIVE | Noted: 2022-06-22

## 2022-06-22 PROBLEM — R31.0 GROSS HEMATURIA: Status: ACTIVE | Noted: 2022-06-22

## 2022-06-22 PROBLEM — R31.9 HEMATURIA: Status: ACTIVE | Noted: 2022-06-22

## 2022-06-22 PROBLEM — I10 HYPERTENSION: Status: ACTIVE | Noted: 2022-06-22

## 2022-06-22 LAB
BACTERIA: ABNORMAL /HPF
BILIRUBIN URINE: NEGATIVE
BLOOD, URINE: ABNORMAL
CLARITY: ABNORMAL
COLOR: ABNORMAL
EPITHELIAL CELLS, UA: ABNORMAL /HPF
GLUCOSE URINE: NEGATIVE MG/DL
HCT VFR BLD CALC: 26.5 % (ref 37–54)
HEMOGLOBIN: 8.2 G/DL (ref 12.5–16.5)
KETONES, URINE: NEGATIVE MG/DL
LEUKOCYTE ESTERASE, URINE: ABNORMAL
NITRITE, URINE: NEGATIVE
PH UA: 7 (ref 5–9)
POTASSIUM SERPL-SCNC: 4.6 MMOL/L (ref 3.5–5)
PROTEIN UA: >=300 MG/DL
RBC UA: ABNORMAL /HPF (ref 0–2)
SPECIFIC GRAVITY UA: 1.02 (ref 1–1.03)
UROBILINOGEN, URINE: 0.2 E.U./DL
WBC UA: ABNORMAL /HPF (ref 0–5)

## 2022-06-22 PROCEDURE — 85014 HEMATOCRIT: CPT

## 2022-06-22 PROCEDURE — 1200000000 HC SEMI PRIVATE

## 2022-06-22 PROCEDURE — 85018 HEMOGLOBIN: CPT

## 2022-06-22 PROCEDURE — 84132 ASSAY OF SERUM POTASSIUM: CPT

## 2022-06-22 PROCEDURE — 51702 INSERT TEMP BLADDER CATH: CPT

## 2022-06-22 PROCEDURE — 36415 COLL VENOUS BLD VENIPUNCTURE: CPT

## 2022-06-22 PROCEDURE — 6370000000 HC RX 637 (ALT 250 FOR IP): Performed by: INTERNAL MEDICINE

## 2022-06-22 PROCEDURE — 99223 1ST HOSP IP/OBS HIGH 75: CPT | Performed by: INTERNAL MEDICINE

## 2022-06-22 PROCEDURE — 81001 URINALYSIS AUTO W/SCOPE: CPT

## 2022-06-22 PROCEDURE — 2580000003 HC RX 258: Performed by: INTERNAL MEDICINE

## 2022-06-22 PROCEDURE — 36410 VNPNXR 3YR/> PHY/QHP DX/THER: CPT

## 2022-06-22 PROCEDURE — 2580000003 HC RX 258: Performed by: STUDENT IN AN ORGANIZED HEALTH CARE EDUCATION/TRAINING PROGRAM

## 2022-06-22 RX ORDER — PROMETHAZINE HYDROCHLORIDE 25 MG/1
12.5 TABLET ORAL EVERY 6 HOURS PRN
Status: DISCONTINUED | OUTPATIENT
Start: 2022-06-22 | End: 2022-06-24 | Stop reason: HOSPADM

## 2022-06-22 RX ORDER — DIVALPROEX SODIUM 250 MG/1
250 TABLET, DELAYED RELEASE ORAL 3 TIMES DAILY
COMMUNITY

## 2022-06-22 RX ORDER — ACETAMINOPHEN 650 MG/20.3ML
650 SUSPENSION ORAL EVERY 6 HOURS PRN
COMMUNITY
End: 2022-09-25 | Stop reason: ALTCHOICE

## 2022-06-22 RX ORDER — ASPIRIN 81 MG/1
81 TABLET, CHEWABLE ORAL DAILY
COMMUNITY

## 2022-06-22 RX ORDER — FERROUS SULFATE 220 (44)/5
220 ELIXIR ORAL DAILY
COMMUNITY

## 2022-06-22 RX ORDER — NITROGLYCERIN 0.4 MG/1
0.4 TABLET SUBLINGUAL EVERY 5 MIN PRN
Status: DISCONTINUED | OUTPATIENT
Start: 2022-06-22 | End: 2022-06-24 | Stop reason: HOSPADM

## 2022-06-22 RX ORDER — 0.9 % SODIUM CHLORIDE 0.9 %
1000 INTRAVENOUS SOLUTION INTRAVENOUS ONCE
Status: DISCONTINUED | OUTPATIENT
Start: 2022-06-22 | End: 2022-06-22

## 2022-06-22 RX ORDER — ATORVASTATIN CALCIUM 40 MG/1
40 TABLET, FILM COATED ORAL DAILY
COMMUNITY

## 2022-06-22 RX ORDER — NITROGLYCERIN 0.4 MG/1
0.4 TABLET SUBLINGUAL EVERY 5 MIN PRN
COMMUNITY

## 2022-06-22 RX ORDER — DIVALPROEX SODIUM 125 MG/1
125 CAPSULE, COATED PELLETS ORAL 2 TIMES DAILY
Status: DISCONTINUED | OUTPATIENT
Start: 2022-06-22 | End: 2022-06-24 | Stop reason: HOSPADM

## 2022-06-22 RX ORDER — ONDANSETRON 2 MG/ML
4 INJECTION INTRAMUSCULAR; INTRAVENOUS EVERY 6 HOURS PRN
Status: DISCONTINUED | OUTPATIENT
Start: 2022-06-22 | End: 2022-06-24 | Stop reason: HOSPADM

## 2022-06-22 RX ORDER — ATORVASTATIN CALCIUM 40 MG/1
40 TABLET, FILM COATED ORAL DAILY
Status: DISCONTINUED | OUTPATIENT
Start: 2022-06-22 | End: 2022-06-24 | Stop reason: HOSPADM

## 2022-06-22 RX ORDER — ACETAMINOPHEN 325 MG/1
650 TABLET ORAL EVERY 6 HOURS PRN
Status: DISCONTINUED | OUTPATIENT
Start: 2022-06-22 | End: 2022-06-24 | Stop reason: HOSPADM

## 2022-06-22 RX ORDER — POLYETHYLENE GLYCOL 3350 17 G/17G
17 POWDER, FOR SOLUTION ORAL DAILY PRN
Status: DISCONTINUED | OUTPATIENT
Start: 2022-06-22 | End: 2022-06-24 | Stop reason: HOSPADM

## 2022-06-22 RX ORDER — AMLODIPINE BESYLATE 10 MG/1
10 TABLET ORAL DAILY
Status: DISCONTINUED | OUTPATIENT
Start: 2022-06-22 | End: 2022-06-24 | Stop reason: HOSPADM

## 2022-06-22 RX ORDER — 0.9 % SODIUM CHLORIDE 0.9 %
500 INTRAVENOUS SOLUTION INTRAVENOUS ONCE
Status: DISCONTINUED | OUTPATIENT
Start: 2022-06-22 | End: 2022-06-24 | Stop reason: HOSPADM

## 2022-06-22 RX ORDER — ACETAMINOPHEN 650 MG/1
650 SUPPOSITORY RECTAL EVERY 6 HOURS PRN
Status: DISCONTINUED | OUTPATIENT
Start: 2022-06-22 | End: 2022-06-24 | Stop reason: HOSPADM

## 2022-06-22 RX ORDER — CHOLECALCIFEROL (VITAMIN D3) 125 MCG
5 CAPSULE ORAL NIGHTLY PRN
COMMUNITY

## 2022-06-22 RX ORDER — OLANZAPINE 5 MG/1
5 TABLET ORAL NIGHTLY
COMMUNITY

## 2022-06-22 RX ORDER — SODIUM CHLORIDE 9 MG/ML
INJECTION, SOLUTION INTRAVENOUS CONTINUOUS
Status: DISCONTINUED | OUTPATIENT
Start: 2022-06-22 | End: 2022-06-22

## 2022-06-22 RX ORDER — METOPROLOL SUCCINATE 50 MG/1
50 TABLET, EXTENDED RELEASE ORAL DAILY
Status: DISCONTINUED | OUTPATIENT
Start: 2022-06-22 | End: 2022-06-22

## 2022-06-22 RX ORDER — METOPROLOL SUCCINATE 50 MG/1
50 TABLET, EXTENDED RELEASE ORAL DAILY
Status: ON HOLD | COMMUNITY
End: 2022-06-24 | Stop reason: HOSPADM

## 2022-06-22 RX ORDER — FAMOTIDINE 20 MG/1
20 TABLET, FILM COATED ORAL 2 TIMES DAILY
Status: DISCONTINUED | OUTPATIENT
Start: 2022-06-22 | End: 2022-06-24 | Stop reason: HOSPADM

## 2022-06-22 RX ORDER — DOCUSATE SODIUM 100 MG/1
100 CAPSULE, LIQUID FILLED ORAL 2 TIMES DAILY
COMMUNITY

## 2022-06-22 RX ORDER — MECOBALAMIN 5000 MCG
5 TABLET,DISINTEGRATING ORAL NIGHTLY PRN
Status: DISCONTINUED | OUTPATIENT
Start: 2022-06-22 | End: 2022-06-24 | Stop reason: HOSPADM

## 2022-06-22 RX ORDER — OLANZAPINE 5 MG/1
5 TABLET ORAL NIGHTLY
Status: DISCONTINUED | OUTPATIENT
Start: 2022-06-22 | End: 2022-06-24 | Stop reason: HOSPADM

## 2022-06-22 RX ADMIN — DIVALPROEX SODIUM 125 MG: 125 CAPSULE, COATED PELLETS ORAL at 22:44

## 2022-06-22 RX ADMIN — ATORVASTATIN CALCIUM 40 MG: 40 TABLET, FILM COATED ORAL at 09:12

## 2022-06-22 RX ADMIN — OLANZAPINE 5 MG: 5 TABLET, FILM COATED ORAL at 22:33

## 2022-06-22 RX ADMIN — AMLODIPINE BESYLATE 10 MG: 10 TABLET ORAL at 09:12

## 2022-06-22 RX ADMIN — Medication 5 MG: at 22:31

## 2022-06-22 RX ADMIN — METOPROLOL TARTRATE 25 MG: 25 TABLET, FILM COATED ORAL at 09:12

## 2022-06-22 RX ADMIN — SODIUM CHLORIDE: 9 INJECTION, SOLUTION INTRAVENOUS at 12:04

## 2022-06-22 RX ADMIN — FAMOTIDINE 20 MG: 20 TABLET ORAL at 09:12

## 2022-06-22 RX ADMIN — DIVALPROEX SODIUM 125 MG: 125 CAPSULE, COATED PELLETS ORAL at 09:12

## 2022-06-22 RX ADMIN — FAMOTIDINE 20 MG: 20 TABLET ORAL at 22:31

## 2022-06-22 RX ADMIN — ACETAMINOPHEN 650 MG: 325 TABLET ORAL at 22:32

## 2022-06-22 ASSESSMENT — PAIN SCALES - GENERAL
PAINLEVEL_OUTOF10: 0
PAINLEVEL_OUTOF10: 5
PAINLEVEL_OUTOF10: 9

## 2022-06-22 ASSESSMENT — PAIN - FUNCTIONAL ASSESSMENT
PAIN_FUNCTIONAL_ASSESSMENT: PREVENTS OR INTERFERES SOME ACTIVE ACTIVITIES AND ADLS
PAIN_FUNCTIONAL_ASSESSMENT: PREVENTS OR INTERFERES SOME ACTIVE ACTIVITIES AND ADLS

## 2022-06-22 ASSESSMENT — PAIN DESCRIPTION - DESCRIPTORS
DESCRIPTORS: DISCOMFORT;ACHING
DESCRIPTORS: DISCOMFORT;ACHING

## 2022-06-22 ASSESSMENT — PAIN DESCRIPTION - ORIENTATION
ORIENTATION: DISTAL
ORIENTATION: DISTAL

## 2022-06-22 ASSESSMENT — PAIN DESCRIPTION - LOCATION
LOCATION: PENIS
LOCATION: PENIS

## 2022-06-22 NOTE — H&P
[address] [place], [place] [address]ist Group   HISTORY AND PHYSICAL EXAM      AUTHOR: Tiki Pathak MD PATIENT NAME: Yudelka Stone   DATE: 2022 MRN: [id number], : 1950   Primary Care Physician: Farrah Leigh MD     CHIEF COMPLAINT / REASON FOR ADMISSION:  Gross hematuria, Bleeding  HPI:   This is a 80 y.o. male  has a past medical history of Acquired hypothyroidism, Acute renal failure ([address] Arthritis, Asthma, History of cardiovascular stress test, and Hypertension. presented with Gross hematuria, Bleeding for last few days prior to arrival to the hospital.  He has a chronic Devine but his catheter was pulled out at yesterday. Nursing facility just realized today that it had been pulled out and sent to the emergency department for further evaluation. The patient was seen and examined at bedside, appears alert and awake with no acute distress and is able to answer simple  questions. On direct questioning, patient denied any  resting ongoing chest pain, resting SOB, hemoptysis, productive cough, fever, ongoing palpitation, active abdominal pain, hematemesis, rectal bleeding, nelda, any other  and GI complaints and any new focal neuro deficits . ROS:  Pertinent positives and negatives are noted in the HPI, all other systems are reviewed and negative    PMH:  Past Medical History:   Diagnosis Date    Acquired hypothyroidism 3/9/2016    Acute renal failure (HCC)     Arthritis     Asthma     History of cardiovascular stress test 3/23/2016    lexiscan    Hypertension        Surgical History:  Past Surgical History:   Procedure Laterality Date    CHOLECYSTECTOMY  2015   [facility]PA[facility]  2005    Dr Zora Ellis        Medications Prior to Admission:    Prior to Admission medications    Medication Sig Start Date End Date Taking?  Authorizing Provider   acetaminophen (TYLENOL) 650 MG/20.3ML SUSP Take 650 mg by mouth every 6 hours as needed for Pain   Yes Historical Provider, MD   aspirin 81 MG chewable tablet 81 mg by PEG Tube route daily   Yes Historical Provider, MD   atorvastatin (LIPITOR) 40 MG tablet 40 mg by PEG Tube route daily   Yes Historical Provider, MD   divalproex (DEPAKOTE SPRINKLE) 125 MG capsule Take 125 mg by mouth 2 times daily Give 2 capsules via peg tube three times a day   Yes Historical Provider, MD   docusate sodium (COLACE) 100 MG capsule 100 mg by PEG Tube route 2 times daily   Yes Historical Provider, MD   melatonin 3 MG TABS tablet 5 mg by PEG Tube route nightly as needed   Yes Historical Provider, MD   metoprolol succinate (TOPROL XL) 50 MG extended release tablet Take 50 mg by mouth daily Give 1 tablet via peg tube two times a day   Yes Historical Provider, MD   nitroGLYCERIN (NITROSTAT) 0.4 MG SL tablet Place 0.4 mg under the tongue every 5 minutes as needed for Chest pain up to max of 3 total doses. If no relief after 1 dose, call 911.    Yes Historical Provider, MD   OLANZapine (ZYPREXA) 5 MG tablet 5 mg by PEG Tube route nightly   Yes Historical Provider, MD   rivaroxaban (XARELTO) 20 MG TABS tablet 20 mg by PEG Tube route daily (with breakfast)   Yes Historical Provider, MD   ferrous sulfate 220 (44 Fe) MG/5ML solution Take 220 mg by mouth daily Patient take 7.5mL via peg tube   Yes Historical Provider, MD   hydrochlorothiazide (HYDRODIURIL) 25 MG tablet Take 12.5 mg by mouth daily  Patient not taking: Reported on 6/22/2022    Historical Provider, MD   sucralfate (CARAFATE) 1 GM tablet Take 1 tablet by mouth 4 times daily  Patient not taking: Reported on 6/22/2022 9/3/17   Cara Prieto DO   famotidine (PEPCID) 20 MG tablet Take 1 tablet by mouth 2 times daily for 10 days 9/3/17 9/13/17  Cara rPieto DO   lisinopril (PRINIVIL;ZESTRIL) 10 MG tablet Take 1 tablet by mouth daily  Patient not taking: Reported on 6/22/2022 7/14/16   Winifred Greenberg DO   Dextromethorphan-Guaifenesin (MUCINEX DM MAXIMUM STRENGTH)  MG TB12 Take 1 tablet by mouth 2 times daily as needed  Patient not taking: Reported on 6/22/2022 6/13/16   Linda Martinez DO   levothyroxine (SYNTHROID) 75 MCG tablet Take 1 tablet by mouth daily  Patient not taking: Reported on 6/22/2022 5/17/16   Linda Martinez DO   ferrous sulfate 325 (65 FE) MG tablet Take 1 tablet by mouth 2 times daily (with meals)  Patient not taking: Reported on 6/22/2022 5/17/16   Linda Martinez DO   pantoprazole (PROTONIX) 40 MG tablet Take 1 tablet by mouth 2 times daily (before meals)  Patient not taking: Reported on 6/22/2022 5/17/16   Linda Martinez DO   pyridoxine (B-6) 50 MG tablet Take 1 tablet by mouth daily  Patient not taking: Reported on 6/22/2022 5/17/16   Linda Martinez DO   amLODIPine (NORVASC) 10 MG tablet Take 1 tablet by mouth daily  Patient taking differently: 2.5 mg by PEG Tube route daily  5/17/16   Linda Martinez DO   loratadine (CLARITIN) 10 MG tablet Take 1 tablet by mouth daily as needed  Patient not taking: Reported on 6/22/2022 4/12/16   Linda Martinez DO   carbamide peroxide (DEBROX) 6.5 % otic solution Place 5 drops into both ears 4 times daily as needed (ear wax)  Patient not taking: Reported on 6/22/2022 4/12/16   Linda Martinez DO   chlorthalidone (HYGROTON) 25 MG tablet Take 25 mg by mouth daily   Patient not taking: Reported on 6/22/2022 2/27/16   Historical Provider, MD       Allergies:    Elemental sulfur and Penicillins    Social History:    reports that he quit smoking about 11 years ago. He has quit using smokeless tobacco. He reports that he does not drink alcohol and does not use drugs. Family History:   family history includes Heart Disease in his mother; Heart Failure in his father and mother.       PHYSICAL EXAM:  Vitals:  /63   Pulse 94   Temp 98.9 °F (37.2 °C) (Oral)   Resp 16   Ht 5' 11\" (1.803 m)   Wt 150 lb (68 kg)   SpO2 95%   BMI 20.92 kg/m²   GENERAL: No acute distress, Alert and awake, Afebrile, Appears tired and weak otherwise hemodynamically stable at present. HEENT: PERRLA, no icterus. OP clear and no exudates. NECK: Supple  no carotid/ophthalmic bruits, JVD None. RESPIRATORY:  Bilateral equal vesicular breath sound with no wheezing. Lung bases are clear. HEART: No tachycardia at bedside and regular rhythm. Normal S1 and S2, No S3 or S4 is audible. No pulsation, thrills, murmur or friction rubs. ABDOMEN: Soft, nondistended, nontender. No hepatomegaly or splenomegaly. No CVA tenderness on the both sides. Bowel sound is present. Devine is draining reddish, bloody urine. Bladder irrigation  EXTREMITIES: All peripheral pulses are present. No calf tenderness or swelling. No pedal edema is present. [facility] NEUROLOGY: Alert and awake. No new focal neuro deficit. Still have residual dysphasia from prior CVA  LABS:  Recent Labs     06/21/22  2243   WBC 9.6   RBC 3.00*   HGB 8.8*   HCT 27.9*   MCV 93.0   MCH 29.3   MCHC 31.5*   RDW 14.4      MPV 12.6*     Recent Labs     06/21/22 2243 06/22/22  0142     --    K 5.3* 4.6     --    CO2 28  --    BUN 32*  --    CREATININE 1.2  --    GLUCOSE 97  --    CALCIUM 8.7  --      No results for input(s): POCGLU in the last 72 hours.   Results for orders placed or performed during the hospital encounter of 06/21/22   CBC with Auto Differential   Result Value Ref Range    WBC 9.6 4.5 - 11.5 E9/L    RBC 3.00 (L) 3.80 - 5.80 E12/L    Hemoglobin 8.8 (L) 12.5 - 16.5 g/dL    Hematocrit 27.9 (L) 37.0 - 54.0 %    MCV 93.0 80.0 - 99.9 fL    MCH 29.3 26.0 - 35.0 pg    MCHC 31.5 (L) 32.0 - 34.5 %    RDW 14.4 11.5 - 15.0 fL    Platelets 838 541 - 481 E9/L    MPV 12.6 (H) 7.0 - 12.0 fL    Neutrophils % 67.7 43.0 - 80.0 %    Immature Granulocytes % 0.5 0.0 - 5.0 %    Lymphocytes % 14.7 (L) 20.0 - 42.0 %    Monocytes % 14.6 (H) 2.0 - 12.0 %    Eosinophils % 2.3 0.0 - 6.0 %    Basophils % 0.2 0.0 - 2.0 %    Neutrophils Absolute 6.52 1.80 - 7.30 E9/L    Immature Granulocytes # 0.05 E9/L    Lymphocytes Absolute 1.41 (L) 1.50 - 4.00 E9/L    Monocytes Absolute 1.40 (H) 0.10 - 0.95 E9/L    Eosinophils Absolute 0.22 0.05 - 0.50 E9/L    Basophils Absolute 0.02 0.00 - 0.20 D8/L   Basic Metabolic Panel w/ Reflex to MG   Result Value Ref Range    Sodium 135 132 - 146 mmol/L    Potassium reflex Magnesium 5.3 (H) 3.5 - 5.0 mmol/L    Chloride 101 98 - 107 mmol/L    CO2 28 22 - 29 mmol/L    Anion Gap 6 (L) 7 - 16 mmol/L    Glucose 97 74 - 99 mg/dL    BUN 32 (H) 6 - 23 mg/dL    CREATININE 1.2 0.7 - 1.2 mg/dL    GFR Non-African American 60 >=60 mL/min/1.73    GFR African American >60     Calcium 8.7 8.6 - 10.2 mg/dL   Potassium   Result Value Ref Range    Potassium 4.6 3.5 - 5.0 mmol/L     ED Course as of 06/22/22 0623   Tue Jun 21, 2022 2131 Difficulty passing catheter, increasing swelling suprapubically. Will obtain CT abdomen/pelvis. [MM]   Wed Jun 22, 2022   0019 Spoke with staff from facility  Notes recently gone downhill recently; confused at times. Dysphagia s/p CVA, CKD, chronic kidney disease, major depressive disorder, a. Fib, BPH, CAD, CHF, HTN.     [BB]   0022 The patient is on ASA, Xarelto 20mg. [BB]   [id number] Spoke with Dr. Cl Casarez, discussed the patient. He will admit the patient [BB]   0105 Peripheral Line Placement Procedure Note    Indication: Poor peripheral access, lack of vascular access    Consent: The patient provided verbal consent for this procedure. Procedure: The patient was positioned appropriately and the skin over the left arm was prepped with Chloraprep. A tourniquet was placed proximal to the vein's location and ultrasound was utilized to identify the vein. A 20 gauge angiocath was inserted into the vein with ultrasound guidance, with the catheter being visualized as it was advanced within the vein. Blood was obtained from the venous access site if needed and the site was secured with a leur lock and a tegaderm adhesive bandage. The patient tolerated the procedure well.     Complications: None    Procedure performed by Norberto Tinsley at 6/22/2022 at 1:05 AM   [BB]      ED Course User Index  [BB] Norberto Tinsley DO  [MM] Merry Gordon DO     Radiology: CT ABDOMEN PELVIS WO CONTRAST Additional Contrast? None    Result Date: 6/21/2022  EXAMINATION: CT OF THE ABDOMEN AND PELVIS WITHOUT CONTRAST 6/21/2022 9:43 pm TECHNIQUE: CT of the abdomen and pelvis was performed without the administration of intravenous contrast. Multiplanar reformatted images are provided for review. Automated exposure control, iterative reconstruction, and/or weight based adjustment of the mA/kV was utilized to reduce the radiation dose to as low as reasonably achievable. COMPARISON: CT abdomen and pelvis with contrast, 10/29/2021 HISTORY: ORDERING SYSTEM PROVIDED HISTORY: pelvic swelling, excessive hematuria, urinary retention TECHNOLOGIST PROVIDED HISTORY: Reason for exam:->pelvic swelling, excessive hematuria, urinary retention Additional Contrast?->None FINDINGS: Lower Chest: Since 10/29/2021, there is interval development calcified pulmonary consolidations in the dependent aspects of the lower lobes, which may be the result of a prior infectious/inflammatory process or contrast aspiration. The heart is normal in size. Small anterior pericardial effusion. No pleural effusion. Organs: Liver: Unremarkable. Gallbladder: Surgically absent. Pancreas: Unremarkable. Spleen:  Unremarkable. Adrenals: Unremarkable. Kidneys: The kidneys are mildly atrophic with multiple prominent cysts. Multiple prominent intrarenal calculi are also seen with the largest in the left kidney 2.1 x 0.6 cm extends from the major calices into the renal pelvis. No hydronephrosis. GI/Bowel: Severe pancolonic diverticulosis without go to GI bowel severe distal colonic diverticulosis without diverticulitis. No bowel wall thickening or obstruction. Normal appendix. Gastrostomy tube in situ. Pelvis:  The Devine catheter extends into the bladder in terminates in the bladder dome, which is herniated into the left inguinal canal.  The prostate gland is mildly enlarged. Fat containing right inguinal hernia. No evidence of fat strangulation. Peritoneum/Retroperitoneum: Mild calcified atherosclerosis is seen in the aorta. No aneurysm. No lymphadenopathy. No free air or free fluid is seen. Bones/Soft Tissues: The visualized bones are intact without fracture or focal lesion. Bones are diffusely sclerotic. 1. The Shipman catheter and the balloon terminate in the bladder dome, which is herniated in the left inguinal canal.  The herniation of the bladder into the left inguinal canal is chronic and is seen as far back as at least 09/07/2015. 2. Prominent bilateral renal calculi. No evidence of hydronephrosis. 3. Interval development of calcified consolidations in the dependent aspects of the lower lobes, which may be due to infectious/inflammatory process or barium aspiration. 4. Fat containing right inguinal hernia. No evidence of fat strangulation. 5. Severe distal colonic diverticulosis without diverticulitis. 6. Diffuse bony sclerosis of unclear etiology. Clinical correlation is needed. RECOMMENDATIONS: Unavailable     ASSESSMENT:    Present on Admission:   Hematuria   Inguinal hernia   Anemia   CVA (cerebral vascular accident) ([facility] [address] [facility]    PLAN:  # Urethral bleeding after traumatic removal of shipman at NH with gross hematuria + Anemia   Vitals stable   Hb is 8.8   Shipman inserted in ED - draining bloody urine   CT showed chronic bladder herniation + Shipman tip in bladder dome - no features of strangulations   Bladder irrigation   Stop Xarelto   Urology consult  # H/o CVA   Hold Xarelto and Aspirin for now   No new neuro deficit    Continue Depakote  # Hypertension   Currently better controlled  Will resume home medications as needed.   Monitor vitals and adjust BP meds as needed  # Rest of the chronic medical problems are stable and will be managed with appropriately with home medications, placed nursing communication order to verify home medications before giving them to the patient. # Diet: On TF/PEG feeding  # IVF's: Yes  # Fall Precaution: Yes  # Disposition: Home/primary residence   # Code Status: Full code by default  # DVT Prophylaxis : SC Heparin or SC Lovenox as on chart  The patient at bedside was counseled about clinical status, laboratory/imaging results, diagnoses, medication side effects, risk, and treatment plan, all questions were answered to patient's satisfaction and verbalized understanding      SIGNATURE: Verónica Whiteside MD PATIENT NAME: [address] [place] [address] #: Hospitalist on call MRN: [id number]     Disclaimer: Portions of this note may have been generated using Dragon voice recognition software. Reasonable efforts were made to correct any dictation errors that resulted due to the programming of this software but some may still be present.

## 2022-06-22 NOTE — PROGRESS NOTES
[address] [place] [address] Hospitalist   Progress Note    Admitting Date and Time: 6/21/2022  6:47 PM  Admit Dx: Chronic anticoagulation [Z79.01]  Hematuria [R31.9]  Anemia, unspecified type [D64.9]  Hematuria, unspecified type [R31.9]    Subjective/interval history:    Pt admitted from nursing facility with gross hematuria after accidental traumatic removal of Devine catheter. Hemoglobin did drop significantly, but not enough to warrant blood transfusion. Dates he feels well and denies any complaints at this time. ROS: denies fever, chills, cp, sob, n/v, HA unless stated above.  sodium chloride  500 mL IntraVENous Once    amLODIPine  10 mg Oral Daily    famotidine  20 mg Oral BID    atorvastatin  40 mg PEG Tube Daily    divalproex  125 mg Oral BID    OLANZapine  5 mg PEG Tube Nightly    metoprolol tartrate  25 mg PEG Tube BID     promethazine, 12.5 mg, Q6H PRN   Or  ondansetron, 4 mg, Q6H PRN  polyethylene glycol, 17 g, Daily PRN  acetaminophen, 650 mg, Q6H PRN   Or  acetaminophen, 650 mg, Q6H PRN  melatonin, 5 mg, Nightly PRN  nitroGLYCERIN, 0.4 mg, Q5 Min PRN         Objective:    /60   Pulse (!) 103   Temp 98.8 °F (37.1 °C) (Oral)   Resp 20   Ht 5' 11\" (1.803 m)   Wt 150 lb (68 kg)   SpO2 95%   BMI 20.92 kg/m²   General Appearance: alert and oriented to person, place and time and in no acute distress  Skin: warm and dry, mild pallor  Head: normocephalic and atraumatic  Eyes: Mild conjunctival pallor, pupils equal, round, and reactive to light, extraocular eye movements intact  Neck: neck supple and non tender without mass   Pulmonary/Chest: Nonlabored on room air.   Clear to auscultation bilaterally  Cardiovascular: normal rate, normal S1 and S2 and no carotid bruits  Abdomen: soft, non-tender, non-distended, normal bowel sounds, no masses or organomegaly  Extremities: no cyanosis, no clubbing and no edema  Neurologic: Moderate dysarthria present  : Devine catheter in place with software. Every effort was made to ensure accuracy; however, inadvertent computerized transcription errors may be present.      Electronically signed by Jose Luis Fregoso DO on 6/22/2022 at 4:13 PM

## 2022-06-22 NOTE — CONSULTS
6/22/2022 8:28 AM  Service: Urology  Group: [facility] (Ihsan/Suzette/Chaitanya)    Armin Bolton  [id number]     Chief Complaint:    Hematuria     History of Present Illness: The patient is a 80 y.o. 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 The catheter has been replaced at the nursing facility. When he presented to the ER he was noted to have gross hematuria. His catheter was removed and replaced with a Simplastic Shipman catheter. He is on Xarelto. Currently his shipman is draining dark cherry colored urine. He is a poor historian. I am not sure if he sees a Urologist.       Past Medical History:   Diagnosis Date    Acquired hypothyroidism 3/9/2016    Acute renal failure ([facility] [address]     Arthritis     Asthma     History of cardiovascular stress test 3/23/2016    lexiscan    Hypertension          Past Surgical History:   Procedure Laterality Date    CHOLECYSTECTOMY  7/17/2015    HERNIA REPAIR  2005    Dr Felipe Base        Medications Prior to Admission:    Medications Prior to Admission: acetaminophen (TYLENOL) 650 MG/20.3ML SUSP, Take 650 mg by mouth every 6 hours as needed for Pain  aspirin 81 MG chewable tablet, 81 mg by PEG Tube route daily  atorvastatin (LIPITOR) 40 MG tablet, 40 mg by PEG Tube route daily  divalproex (DEPAKOTE SPRINKLE) 125 MG capsule, Take 125 mg by mouth 2 times daily Give 2 capsules via peg tube three times a day  docusate sodium (COLACE) 100 MG capsule, 100 mg by PEG Tube route 2 times daily  melatonin 3 MG TABS tablet, 5 mg by PEG Tube route nightly as needed  metoprolol succinate (TOPROL XL) 50 MG extended release tablet, Take 50 mg by mouth daily Give 1 tablet via peg tube two times a day  nitroGLYCERIN (NITROSTAT) 0.4 MG SL tablet, Place 0.4 mg under the tongue every 5 minutes as needed for Chest pain up to max of 3 total doses. If no relief after 1 dose, call 911.   OLANZapine (ZYPREXA) 5 MG tablet, 5 mg by PEG Tube route nightly  rivaroxaban (XARELTO) 20 MG TABS tablet, 20 mg by PEG Tube route daily (with breakfast)  ferrous sulfate 220 (44 Fe) MG/5ML solution, Take 220 mg by mouth daily Patient take 7.5mL via peg tube  hydrochlorothiazide (HYDRODIURIL) 25 MG tablet, Take 12.5 mg by mouth daily (Patient not taking: Reported on 6/22/2022)  sucralfate (CARAFATE) 1 GM tablet, Take 1 tablet by mouth 4 times daily (Patient not taking: Reported on 6/22/2022)  famotidine (PEPCID) 20 MG tablet, Take 1 tablet by mouth 2 times daily for 10 days  lisinopril (PRINIVIL;ZESTRIL) 10 MG tablet, Take 1 tablet by mouth daily (Patient not taking: Reported on 6/22/2022)  Dextromethorphan-Guaifenesin (MUCINEX DM MAXIMUM STRENGTH)  MG TB12, Take 1 tablet by mouth 2 times daily as needed (Patient not taking: Reported on 6/22/2022)  levothyroxine (SYNTHROID) 75 MCG tablet, Take 1 tablet by mouth daily (Patient not taking: Reported on 6/22/2022)  ferrous sulfate 325 (65 FE) MG tablet, Take 1 tablet by mouth 2 times daily (with meals) (Patient not taking: Reported on 6/22/2022)  pantoprazole (PROTONIX) 40 MG tablet, Take 1 tablet by mouth 2 times daily (before meals) (Patient not taking: Reported on 6/22/2022)  pyridoxine (B-6) 50 MG tablet, Take 1 tablet by mouth daily (Patient not taking: Reported on 6/22/2022)  amLODIPine (NORVASC) 10 MG tablet, Take 1 tablet by mouth daily (Patient taking differently: 2.5 mg by PEG Tube route daily )  loratadine (CLARITIN) 10 MG tablet, Take 1 tablet by mouth daily as needed (Patient not taking: Reported on 6/22/2022)  carbamide peroxide (DEBROX) 6.5 % otic solution, Place 5 drops into both ears 4 times daily as needed (ear wax) (Patient not taking: Reported on 6/22/2022)  chlorthalidone (HYGROTON) 25 MG tablet, Take 25 mg by mouth daily  (Patient not taking: Reported on 6/22/2022)    Allergies:    Elemental sulfur and Penicillins    Social History:    reports that he quit smoking about 11 years ago. He has quit using smokeless tobacco. He reports that he does not drink alcohol and does not use drugs. Family History:   Non-contributory to this Urological problem  family history includes Heart Disease in his mother; Heart Failure in his father and mother. Review of Systems:  Unable to obtain due to confusion     Physical Exam:     Vitals:  /63   Pulse 94   Temp 98.9 °F (37.2 °C) (Oral)   Resp 16   Ht 5' 11\" (1.803 m)   Wt 150 lb (68 kg)   SpO2 95%   BMI 20.92 kg/m²     General:  Awake and alert, confused   HEENT:  Normocephalic, atraumatic. Lungs:  Respirations symmetric and non-labored. Abdomen:  soft, nontender, no masses  Extremities:  No clubbing, cyanosis, or edema  Skin:  Warm and dry, no open lesions or rashes  Neuro: There are no motor or sensory deficits in the 4 quadrant extremities   Rectal: deferred  Genitourinary: Devine draining dark cherry urine with blood at meatus     Labs:     Recent Labs     06/21/22  2243   WBC 9.6   RBC 3.00*   HGB 8.8*   HCT 27.9*   MCV 93.0   MCH 29.3   MCHC 31.5*   RDW 14.4      MPV 12.6*         Recent Labs     06/21/22  2243   CREATININE 1.2       Lab Results   Component Value Date    PSA 1.43 01/26/2016       Imaging:   Narrative   EXAMINATION:   CT OF THE ABDOMEN AND PELVIS WITHOUT CONTRAST 6/21/2022 9:43 pm       TECHNIQUE:   CT of the abdomen and pelvis was performed without the administration of   intravenous contrast. Multiplanar reformatted images are provided for review.    Automated exposure control, iterative reconstruction, and/or weight based   adjustment of the mA/kV was utilized to reduce the radiation dose to as low   as reasonably achievable.       COMPARISON:   CT abdomen and pelvis with contrast, 10/29/2021       HISTORY:   ORDERING SYSTEM PROVIDED HISTORY: pelvic swelling, excessive hematuria,   urinary retention   TECHNOLOGIST PROVIDED HISTORY:   Reason for exam:->pelvic swelling, excessive hematuria, urinary retention   Additional Contrast?->None       FINDINGS:   Lower Chest: Since 10/29/2021, there is interval development calcified   pulmonary consolidations in the dependent aspects of the lower lobes, which   may be the result of a prior infectious/inflammatory process or contrast   aspiration.  The heart is normal in size.  Small anterior pericardial   effusion.  No pleural effusion.       Organs:       Liver: Unremarkable.       Gallbladder: Surgically absent.       Pancreas: Unremarkable.       Spleen:  Unremarkable.       Adrenals: Unremarkable.       Kidneys: The kidneys are mildly atrophic with multiple prominent cysts. Multiple prominent intrarenal calculi are also seen with the largest in the   left kidney 2.1 x 0.6 cm extends from the major calices into the renal   pelvis.  No hydronephrosis.       GI/Bowel: Severe pancolonic diverticulosis without go to GI bowel severe   distal colonic diverticulosis without diverticulitis.  No bowel wall   thickening or obstruction.  Normal appendix.  Gastrostomy tube in situ.       Pelvis: The Devine catheter extends into the bladder in terminates in the   bladder dome, which is herniated into the left inguinal canal.  The prostate   gland is mildly enlarged.  Fat containing right inguinal hernia.  No evidence   of fat strangulation.       Peritoneum/Retroperitoneum: Mild calcified atherosclerosis is seen in the   aorta. No aneurysm.  No lymphadenopathy. No free air or free fluid is seen.       Bones/Soft Tissues: The visualized bones are intact without fracture or focal   lesion.  Bones are diffusely sclerotic.           Impression   1. The Devine catheter and the balloon terminate in the bladder dome, which is   herniated in the left inguinal canal.  The herniation of the bladder into the   left inguinal canal is chronic and is seen as far back as at least 09/07/2015. 2. Prominent bilateral renal calculi.  No evidence of hydronephrosis.    3. Interval development of calcified consolidations in the dependent aspects   of the lower lobes, which may be due to infectious/inflammatory process or   barium aspiration. 4. Fat containing right inguinal hernia.  No evidence of fat strangulation. 5. Severe distal colonic diverticulosis without diverticulitis. 6. Diffuse bony sclerosis of unclear etiology.  Clinical correlation is   needed.       RECOMMENDATIONS:   Unavailable                     Assessment/plan:  Gross Hematuria related to traumatic shipman removal in the setting of Xarelto  Bilateral renal cysts   Bilateral Non-Obstructing Renal Calculi   Bilateral inguinal hernias    His shipman was hand irrigated for moderate amount of clot retrieval with approximately 1500cc of saline. Urine returned to an christi in color. CTAP reviewed, his bladder is herniated into his left inguinal canal/hernia. This does appear chronic. He has bilateral renal cysts that appear benign and bilateral non-obstructing renal calculi as well.    His creatinine is stable and appears baseline  Cont to watch the hemoglobin  Transfusions per primary   Cont the shipman catheter  Irrigate every 2 hours and PRN  Hold on CBI unless unable to clear with manual irrigations  Send urine cytology  Will follow         Electronically signed by MARIELY Matthews CNP on 6/22/2022 at 8:28 AM   I have interviewed and examined the pt amd agree with the above note and plan

## 2022-06-22 NOTE — PLAN OF CARE
Problem: Safety - Adult  Goal: Free from fall injury  6/22/2022 1200 by Truman Alanis RN  Outcome: Progressing     Problem: Skin/Tissue Integrity  Goal: Absence of new skin breakdown  Description: 1. Monitor for areas of redness and/or skin breakdown  2. Assess vascular access sites hourly  3. Every 4-6 hours minimum:  Change oxygen saturation probe site  4. Every 4-6 hours:  If on nasal continuous positive airway pressure, respiratory therapy assess nares and determine need for appliance change or resting period.   6/22/2022 1200 by Truman Alanis RN  Outcome: Progressing     Problem: ABCDS Injury Assessment  Goal: Absence of physical injury  Outcome: Progressing

## 2022-06-22 NOTE — CARE COORDINATION
Patient was admitted from [facility] LIYA spoke with Marixa at [facility], she states patient is a long term care bedhold there and can return on discharge. NO PRECERT needed but will need a RAPID COVID test day of discharge. Confirmed with patient, plan is back to [facility] Will follow for discharge back to Nursing facility when medically stable.

## 2022-06-23 LAB
ALBUMIN SERPL-MCNC: 2.4 G/DL (ref 3.5–5.2)
ALP BLD-CCNC: 63 U/L (ref 40–129)
ALT SERPL-CCNC: <5 U/L (ref 0–40)
ANION GAP SERPL CALCULATED.3IONS-SCNC: 6 MMOL/L (ref 7–16)
AST SERPL-CCNC: 15 U/L (ref 0–39)
BASOPHILS ABSOLUTE: 0.04 E9/L (ref 0–0.2)
BASOPHILS RELATIVE PERCENT: 0.5 % (ref 0–2)
BILIRUB SERPL-MCNC: 0.3 MG/DL (ref 0–1.2)
BUN BLDV-MCNC: 37 MG/DL (ref 6–23)
CALCIUM SERPL-MCNC: 8.3 MG/DL (ref 8.6–10.2)
CHLORIDE BLD-SCNC: 102 MMOL/L (ref 98–107)
CO2: 28 MMOL/L (ref 22–29)
CREAT SERPL-MCNC: 1.3 MG/DL (ref 0.7–1.2)
EOSINOPHILS ABSOLUTE: 0.55 E9/L (ref 0.05–0.5)
EOSINOPHILS RELATIVE PERCENT: 7.5 % (ref 0–6)
GFR AFRICAN AMERICAN: >60
GFR NON-AFRICAN AMERICAN: 54 ML/MIN/1.73
GLUCOSE BLD-MCNC: 110 MG/DL (ref 74–99)
HCT VFR BLD CALC: 26 % (ref 37–54)
HCT VFR BLD CALC: 26.1 % (ref 37–54)
HEMOGLOBIN: 7.8 G/DL (ref 12.5–16.5)
HEMOGLOBIN: 8 G/DL (ref 12.5–16.5)
IMMATURE GRANULOCYTES #: 0.02 E9/L
IMMATURE GRANULOCYTES %: 0.3 % (ref 0–5)
LYMPHOCYTES ABSOLUTE: 1.26 E9/L (ref 1.5–4)
LYMPHOCYTES RELATIVE PERCENT: 17.1 % (ref 20–42)
MCH RBC QN AUTO: 29.2 PG (ref 26–35)
MCHC RBC AUTO-ENTMCNC: 30.8 % (ref 32–34.5)
MCV RBC AUTO: 94.9 FL (ref 80–99.9)
MONOCYTES ABSOLUTE: 1.34 E9/L (ref 0.1–0.95)
MONOCYTES RELATIVE PERCENT: 18.2 % (ref 2–12)
NEUTROPHILS ABSOLUTE: 4.14 E9/L (ref 1.8–7.3)
NEUTROPHILS RELATIVE PERCENT: 56.4 % (ref 43–80)
PDW BLD-RTO: 14.7 FL (ref 11.5–15)
PLATELET # BLD: 172 E9/L (ref 130–450)
PMV BLD AUTO: 12.1 FL (ref 7–12)
POTASSIUM REFLEX MAGNESIUM: 4.3 MMOL/L (ref 3.5–5)
RBC # BLD: 2.74 E12/L (ref 3.8–5.8)
SODIUM BLD-SCNC: 136 MMOL/L (ref 132–146)
TOTAL PROTEIN: 5.8 G/DL (ref 6.4–8.3)
WBC # BLD: 7.4 E9/L (ref 4.5–11.5)

## 2022-06-23 PROCEDURE — 85014 HEMATOCRIT: CPT

## 2022-06-23 PROCEDURE — 51702 INSERT TEMP BLADDER CATH: CPT

## 2022-06-23 PROCEDURE — 36415 COLL VENOUS BLD VENIPUNCTURE: CPT

## 2022-06-23 PROCEDURE — 6370000000 HC RX 637 (ALT 250 FOR IP): Performed by: INTERNAL MEDICINE

## 2022-06-23 PROCEDURE — 80053 COMPREHEN METABOLIC PANEL: CPT

## 2022-06-23 PROCEDURE — 99232 SBSQ HOSP IP/OBS MODERATE 35: CPT | Performed by: INTERNAL MEDICINE

## 2022-06-23 PROCEDURE — 1200000000 HC SEMI PRIVATE

## 2022-06-23 PROCEDURE — 85025 COMPLETE CBC W/AUTO DIFF WBC: CPT

## 2022-06-23 PROCEDURE — 85018 HEMOGLOBIN: CPT

## 2022-06-23 RX ADMIN — METOPROLOL TARTRATE 25 MG: 25 TABLET, FILM COATED ORAL at 20:10

## 2022-06-23 RX ADMIN — OLANZAPINE 5 MG: 5 TABLET, FILM COATED ORAL at 20:10

## 2022-06-23 RX ADMIN — ATORVASTATIN CALCIUM 40 MG: 40 TABLET, FILM COATED ORAL at 08:30

## 2022-06-23 RX ADMIN — FAMOTIDINE 20 MG: 20 TABLET ORAL at 08:30

## 2022-06-23 RX ADMIN — METOPROLOL TARTRATE 25 MG: 25 TABLET, FILM COATED ORAL at 08:30

## 2022-06-23 RX ADMIN — FAMOTIDINE 20 MG: 20 TABLET ORAL at 20:10

## 2022-06-23 RX ADMIN — AMLODIPINE BESYLATE 10 MG: 10 TABLET ORAL at 08:30

## 2022-06-23 RX ADMIN — DIVALPROEX SODIUM 125 MG: 125 CAPSULE, COATED PELLETS ORAL at 08:30

## 2022-06-23 RX ADMIN — DIVALPROEX SODIUM 125 MG: 125 CAPSULE, COATED PELLETS ORAL at 20:10

## 2022-06-23 ASSESSMENT — PAIN SCALES - GENERAL: PAINLEVEL_OUTOF10: 0

## 2022-06-23 NOTE — PROGRESS NOTES
[address] [place] [address] Hospitalist   Progress Note    Admitting Date and Time: 6/21/2022  6:47 PM  Admit Dx: Chronic anticoagulation [Z79.01]  Hematuria [R31.9]  Anemia, unspecified type [D64.9]  Hematuria, unspecified type [R31.9]    Subjective/interval history:    6/22: Pt admitted from nursing facility with gross hematuria after accidental traumatic removal of Devine catheter. Hemoglobin did drop significantly, but not enough to warrant blood transfusion. Dates he feels well and denies any complaints at this time. 6/23: Patient notes that his IV is irritating him, otherwise feels well. From Devine catheter now with minimal blood. Hemoglobin did drop slightly to 7.8. ROS: denies fever, chills, cp, sob, n/v, HA unless stated above.  sodium chloride  500 mL IntraVENous Once    amLODIPine  10 mg Oral Daily    famotidine  20 mg Oral BID    atorvastatin  40 mg PEG Tube Daily    divalproex  125 mg Oral BID    OLANZapine  5 mg PEG Tube Nightly    metoprolol tartrate  25 mg PEG Tube BID     promethazine, 12.5 mg, Q6H PRN   Or  ondansetron, 4 mg, Q6H PRN  polyethylene glycol, 17 g, Daily PRN  acetaminophen, 650 mg, Q6H PRN   Or  acetaminophen, 650 mg, Q6H PRN  melatonin, 5 mg, Nightly PRN  nitroGLYCERIN, 0.4 mg, Q5 Min PRN         Objective:    BP (!) 119/57   Pulse 92   Temp 98.1 °F (36.7 °C) (Oral)   Resp 18   Ht 5' 11\" (1.803 m)   Wt 150 lb (68 kg)   SpO2 96%   BMI 20.92 kg/m²   General Appearance: alert and oriented to person, place and time and in no acute distress  Skin: warm and dry, mild pallor  Head: normocephalic and atraumatic  Eyes: Mild conjunctival pallor, pupils equal, round, and reactive to light, extraocular eye movements intact  Neck: neck supple and non tender without mass   Pulmonary/Chest: Nonlabored on room air.   Clear to auscultation bilaterally  Cardiovascular: normal rate, normal S1 and S2 and no carotid bruits  Abdomen: soft, non-tender, non-distended, normal bowel sounds, no masses or organomegaly  Extremities: no cyanosis, no clubbing and no edema  Neurologic: Moderate dysarthria present  : Devine catheter in place with mildly blood-tinged urine    Recent Labs     06/21/22  2243 06/22/22  0142 06/23/22  0739     --  136   K 5.3* 4.6 4.3     --  102   CO2 28  --  28   BUN 32*  --  37*   CREATININE 1.2  --  1.3*   GLUCOSE 97  --  110*   CALCIUM 8.7  --  8.3*       Recent Labs     06/23/22  0739   ALKPHOS 63   PROT 5.8*   LABALBU 2.4*   BILITOT 0.3   AST 15   ALT <5       Recent Labs     06/21/22  2243 06/21/22  2243 06/22/22  1422 06/23/22  0739 06/23/22  1512   WBC 9.6  --   --  7.4  --    RBC 3.00*  --   --  2.74*  --    HGB 8.8*   < > 8.2* 8.0* 7.8*   HCT 27.9*   < > 26.5* 26.0* 26.1*   MCV 93.0  --   --  94.9  --    MCH 29.3  --   --  29.2  --    MCHC 31.5*  --   --  30.8*  --    RDW 14.4  --   --  14.7  --      --   --  172  --    MPV 12.6*  --   --  12.1*  --     < > = values in this interval not displayed. Radiology:   CT ABDOMEN PELVIS WO CONTRAST Additional Contrast? None   Final Result   1. The Devine catheter and the balloon terminate in the bladder dome, which is   herniated in the left inguinal canal.  The herniation of the bladder into the   left inguinal canal is chronic and is seen as far back as at least 09/07/2015. 2. Prominent bilateral renal calculi. No evidence of hydronephrosis. 3. Interval development of calcified consolidations in the dependent aspects   of the lower lobes, which may be due to infectious/inflammatory process or   barium aspiration. 4. Fat containing right inguinal hernia. No evidence of fat strangulation. 5. Severe distal colonic diverticulosis without diverticulitis. 6. Diffuse bony sclerosis of unclear etiology. Clinical correlation is   needed.       RECOMMENDATIONS:   Unavailable             Assessment and Plan:  Principal Problem:    Gross hematuria  Active Problems:    Inguinal hernia    History of stroke    Acute blood loss anemia  Resolved Problems:    * No resolved hospital problems. *      1. Gross hematuria due to traumatic Devine removal  -Frequent irrigation per urology recommendation. -Appears to be resolving    2. Acute blood loss anemia  -Globin did drop slightly to 7.8. Will recheck tomorrow morning, and if stable, discharge back to nursing facility    Disposition: If hemoglobin stable tomorrow morning, discharge back to SNF    NOTE: This report was transcribed using voice recognition software. Every effort was made to ensure accuracy; however, inadvertent computerized transcription errors may be present.      Electronically signed by Wayne Malave DO on 6/23/2022 at 5:25 PM

## 2022-06-23 NOTE — PLAN OF CARE
Problem: Safety - Adult  Goal: Free from fall injury  6/23/2022 1355 by Clearnce Olszewski, RN  Outcome: Progressing     Problem: Skin/Tissue Integrity  Goal: Absence of new skin breakdown  Description: 1. Monitor for areas of redness and/or skin breakdown  2. Assess vascular access sites hourly  3. Every 4-6 hours minimum:  Change oxygen saturation probe site  4. Every 4-6 hours:  If on nasal continuous positive airway pressure, respiratory therapy assess nares and determine need for appliance change or resting period.   6/23/2022 1355 by Clearnce Olszewski, RN  Outcome: Progressing

## 2022-06-23 NOTE — CARE COORDINATION
Plan remains back to [facility] on discharge, spoke with Noemi George there and she states she will let building know he is a possible DC there later [address] [place] [address] needed but will need a RAPID COVID test day of discharge.  Confirmed with patient, plan is back to [facility] Will follow for discharge back to Nursing facility when medically stable. If Discharged after hours can call [facility] [phone] or [facility] [phone].

## 2022-06-23 NOTE — PROGRESS NOTES
6/23/2022 9:21 AM  Service: Urology  Group: [facility] (Ihsan/Suzette/Chaitanya)    Quyenrl Layer  [id number]    Subjective:    He is awake and alert   Confused  Shipman is draining christi urine with sediment   No family present       Review of Systems  Constitutional: No fever or chills   Respiratory: negative for cough and hemoptysis  Cardiovascular: negative for chest pain and dyspnea  Gastrointestinal: negative for abdominal pain, diarrhea, nausea and vomiting   : See above  Derm: negative for rash and skin lesion(s)  Neurological: negative for seizures and tremors  Musculoskeletal: Negative    Psychiatric: Negative   All other reviews are negative      Scheduled Meds:   sodium chloride  500 mL IntraVENous Once    amLODIPine  10 mg Oral Daily    famotidine  20 mg Oral BID    atorvastatin  40 mg PEG Tube Daily    divalproex  125 mg Oral BID    OLANZapine  5 mg PEG Tube Nightly    metoprolol tartrate  25 mg PEG Tube BID       Objective:  Vitals:    06/23/22 0830   BP: (!) 114/58   Pulse: 100   Resp: 18   Temp: 97.6 °F (36.4 °C)   SpO2: 97%         Allergies: Elemental sulfur and Penicillins    General Appearance: wake and alert, answers questions, no distress   Skin: no rash or erythema  Head: normocephalic and atraumatic  Pulmonary/Chest: normal air movement, no respiratory distress  Abdomen: soft, non-tender, non-distended  Genitourinary: shipman draining christi urine in tubing   Extremities: no cyanosis, clubbing or edema         Labs:     Recent Labs     06/23/22  0739      K 4.3      CO2 28   BUN 37*   CREATININE 1.3*   GLUCOSE 110*   CALCIUM 8.3*       Lab Results   Component Value Date    HGB 8.0 06/23/2022    HCT 26.0 06/23/2022       Lab Results   Component Value Date    PSA 1.43 01/26/2016         Assessment/Plan:  Gross Hematuria related to traumatic shipman removal in the setting of Xarelto  Bilateral renal cysts   Bilateral Non-Obstructing Renal Calculi   Bilateral inguinal hernias    I elected to remove the simplastic shipman catheter due to the rigidity of the catheter upon discharge. I replaced the shipman with a 20F coude shipman catheter without difficulty. Urine remained christi in color in the tubing. He tolerated well.      Antibiotics per primary   Cont the shipman   Change every 4 weeks  Hold on further acute  interventions at this time  Please call with further questions or concerns     Anca Contreras, APRN - CNP   [facility]

## 2022-06-23 NOTE — PLAN OF CARE
Problem: Pain  Goal: Verbalizes/displays adequate comfort level or baseline comfort level  Outcome: Progressing     Problem: ABCDS Injury Assessment  Goal: Absence of physical injury  Recent Flowsheet Documentation  Taken 6/22/2022 2200 by Ben Finn RN  Absence of Physical Injury: Implement safety measures based on patient assessment     Problem: Skin/Tissue Integrity  Goal: Absence of new skin breakdown  Description: 1. Monitor for areas of redness and/or skin breakdown  2. Assess vascular access sites hourly  3. Every 4-6 hours minimum:  Change oxygen saturation probe site  4. Every 4-6 hours:  If on nasal continuous positive airway pressure, respiratory therapy assess nares and determine need for appliance change or resting period.   Outcome: Progressing     Problem: Safety - Adult  Goal: Free from fall injury  Outcome: Progressing

## 2022-06-24 VITALS
OXYGEN SATURATION: 95 % | BODY MASS INDEX: 21 KG/M2 | WEIGHT: 150 LBS | DIASTOLIC BLOOD PRESSURE: 60 MMHG | HEART RATE: 84 BPM | TEMPERATURE: 98.3 F | RESPIRATION RATE: 18 BRPM | SYSTOLIC BLOOD PRESSURE: 125 MMHG | HEIGHT: 71 IN

## 2022-06-24 LAB
ABO/RH: NORMAL
ANTIBODY SCREEN: NORMAL
HCT VFR BLD CALC: 23.8 % (ref 37–54)
HCT VFR BLD CALC: 25.6 % (ref 37–54)
HEMOGLOBIN: 7.2 G/DL (ref 12.5–16.5)
HEMOGLOBIN: 7.8 G/DL (ref 12.5–16.5)

## 2022-06-24 PROCEDURE — 99238 HOSP IP/OBS DSCHRG MGMT 30/<: CPT | Performed by: INTERNAL MEDICINE

## 2022-06-24 PROCEDURE — 36415 COLL VENOUS BLD VENIPUNCTURE: CPT

## 2022-06-24 PROCEDURE — 86900 BLOOD TYPING SEROLOGIC ABO: CPT

## 2022-06-24 PROCEDURE — 97165 OT EVAL LOW COMPLEX 30 MIN: CPT | Performed by: OCCUPATIONAL THERAPIST

## 2022-06-24 PROCEDURE — 86901 BLOOD TYPING SEROLOGIC RH(D): CPT

## 2022-06-24 PROCEDURE — 86850 RBC ANTIBODY SCREEN: CPT

## 2022-06-24 PROCEDURE — 85018 HEMOGLOBIN: CPT

## 2022-06-24 PROCEDURE — 6370000000 HC RX 637 (ALT 250 FOR IP): Performed by: INTERNAL MEDICINE

## 2022-06-24 PROCEDURE — 85014 HEMATOCRIT: CPT

## 2022-06-24 PROCEDURE — 97530 THERAPEUTIC ACTIVITIES: CPT | Performed by: OCCUPATIONAL THERAPIST

## 2022-06-24 PROCEDURE — 97161 PT EVAL LOW COMPLEX 20 MIN: CPT | Performed by: PHYSICAL THERAPIST

## 2022-06-24 RX ORDER — AMLODIPINE BESYLATE 2.5 MG/1
2.5 TABLET ORAL DAILY
DISCHARGE
Start: 2022-06-24 | End: 2022-09-25 | Stop reason: ALTCHOICE

## 2022-06-24 RX ADMIN — DIVALPROEX SODIUM 125 MG: 125 CAPSULE, COATED PELLETS ORAL at 07:47

## 2022-06-24 RX ADMIN — METOPROLOL TARTRATE 25 MG: 25 TABLET, FILM COATED ORAL at 07:49

## 2022-06-24 RX ADMIN — ATORVASTATIN CALCIUM 40 MG: 40 TABLET, FILM COATED ORAL at 07:47

## 2022-06-24 RX ADMIN — AMLODIPINE BESYLATE 10 MG: 10 TABLET ORAL at 07:49

## 2022-06-24 RX ADMIN — FAMOTIDINE 20 MG: 20 TABLET ORAL at 07:47

## 2022-06-24 ASSESSMENT — PAIN SCALES - GENERAL: PAINLEVEL_OUTOF10: 0

## 2022-06-24 NOTE — PROGRESS NOTES
[facility]  [address]         Date:2022                                                   Patient Name: Jennifer Chatman     MRN: [id number]     : 1950     Room: [address] [place] [address]       Evaluating OT: Hamilton Littlejohn, OTR/L; [id number]       Referring Provider and Orders/Date:  OT eval and treat Start: 22, End: 22, ONE TIME, Standing Count: 1 Occurrences, R    Andrzej King DO     Diagnosis:   1. Anemia, unspecified type    2. Chronic anticoagulation    3.  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, peg tube, shipman    Recommended placement: subacute    Assessment of current deficits     [x] Functional mobility  [x]ADLs  [x] Strength               [x]Cognition     [x] Functional transfers   [x] IADLs         [x] Safety Awareness   [x]Endurance     [x] Fine Coordination              [x] Balance      [] Vision/perception   []Sensation      [x]Gross Motor Coordination  [x] ROM  [] Delirium                   [] Motor Control     OT PLAN OF CARE   OT POC based on physician orders, patient diagnosis and results of clinical assessment    Frequency/Duration 1-3 days/wk for 2 weeks PRN   Specific OT Treatment Interventions to include:   * Instruction/training on adapted ADL techniques and AE recommendations to increase functional independence within precautions       * Training on energy conservation strategies, correct breathing pattern and techniques to improve independence/tolerance for self-care routine  * Functional transfer/mobility training/DME recommendations for increased independence, safety, and fall prevention  * Patient/Family education to increase follow through with safety techniques and functional independence  * Recommendation of environmental modifications for increased safety with functional transfers/mobility and ADLs  * Cognitive retraining/development of therapeutic activities to improve problem solving, judgement, memory, and attention for increased safety/participation in ADL/IADL tasks  * Therapeutic exercise to improve motor endurance, ROM, and functional strength for ADLs/functional transfers  * Therapeutic activities to facilitate/challenge dynamic balance, stand tolerance for increased safety and independence with ADLs  * Therapeutic activities to facilitate gross/fine motor skills for increased independence with ADLs  * Neuro-muscular re-education: facilitation of righting/equilibrium reactions, midline orientation, scapular stability/mobility, normalization of muscle tone, and facilitation of volitional active controled movement  * Positioning to improve skin integrity, interaction with environment and functional independence     Recommended Adaptive Equipment/DME: TBD      Home Living: Pt a possible poor historian on eval. Pt admitted from [facility] where he lives long term. Bathroom setup: uses bed pan for toileting, sponge bathing or shower with roll in chair    DME owned: w/c or walker     Prior Level of Function: mod A with ADLs , dep with IADLs; ambulated short distance with walker or in w/c   Driving: no   Occupation: retired   Enjoys: Bingo    Pain Level: none  Cognition: A&O: 2/4 to self and place ;  Follows 1-2 step directions   Memory:  Fair-   Sequencing:  Fair-   Problem solving:  Poor+   Judgement/safety:  Poor+    AM-PAC Daily Activity Inpatient   How much help for putting on and taking off regular lower body clothing?: Total  How much help for Bathing?: A Lot  How much help for Toileting?: Total  How much help for putting on and taking off regular upper body clothing?: Total  How much help for taking care of personal grooming?: A Lot  How much help for eating meals?: Total  AM-PAC Inpatient Daily Activity Raw Score: 8  AM-PAC Inpatient ADL T-Scale Score : 22.86  ADL Inpatient CMS 0-100% Score: 85.69  ADL Inpatient CMS G-Code Modifier : CM    Functional Assessment:     Initial Eval Status  Date: 6/24/2022   Treatment Status  Date: STGs = LTGs  Time frame: 10-14 days   Feeding Dependent with NPO and peg tube mangement  Mod A   Grooming Moderate Assist for hair wash, hair comb in the back, oral care with mouth wash and toothette and face/hand wash with warm wipes. All from supine level  Minimal Assist    UB Dressing Dependent for gown management from supine due to decreased motor planning  Moderate Assist    LB Dressing Dependent to doff socks from supine level. Maximal Assist    Bathing Maximal Assist suspected, but only simulated on eval per pt request due to fatigue. Moderate Assist    Toileting Dependent with shipman management  Maximal Assist    Bed Mobility  Pt sitting EOB with PT on arrival.   Sit to supine: Maximal Assist  X 2   Supine to sit: Moderate Assist   Sit to supine: Moderate Assist    Functional Transfers Maximal Assist x 2 with walker from elevated surface of bed. High fall risk. Moderate Assist    Functional Mobility Maximal Assist x 2 with walker to attempt side stepping to [facility] with poor+ tolerance and fall risk. Maximal Assist    Balance Sitting:     Static:  Fair-    Dynamic:poor+  Standing:  poor  Sitting:     Static:  fair    Dynamic:fair-  Standing: poor/poor+   Activity Tolerance Vitals with activity: WFL on room air overall;   Sitting EOB for 5min tolerance overall; standing <1min.    Increase standing tolerance for >3min with stable vital signs for carry over into toileting, functional tranfers and indep in ADLs   Visual/  Perceptual Glasses: Present; WFL    Reports change in vision since admission: No     NA     Hand Dominance  [x] Right  [] Left    AROM (PROM) Strength Additional Info:  Goal:   RUE  WFL overall with shoulder limitations PTA 3+/5 good  and fair- FMC/dexterity noted during ADL tasks  Opposition [] Intact [x] Impaired  Finger to nose [x] Intact [] Impaired 4-/5MMT generally for carry over into self care, functional transfers and functional mobility with AD. LUE WFL overall with shoulder limitations PTA 3+/5 good  and fair- FMC/dexterity noted during ADL tasks  Opposition [] Intact [x] Impaired  Finger to nose [x] Intact [] Impaired 4-/5MMT generally for carry over into self care, functional transfers and functional mobility with AD. Hearing: WFL  Sensation:  No c/o numbness or tingling   Tone: WFL   Edema: none    Comments: Upon arrival patient sitting EOB with PT. PT reported mod A for sitting balance, but improving throughout session. Pt required dep A for most UB ADLs and dep A LB ADLs tasks. Limited with max A x 2 for standing during LB ADLs and functional transfers. Patient required co-evaluation with physical therapy for a portion of the session due to the level of physical assistance needed and the patients endurance level to tolerate separate sessions, as indicated under evaluation time. The biggest barriers reflect that of functional transfers, functional mobility, UB/LB ADLs, cognition, activity tolerance, balance, safety and strengthening. At end of session, patient supine with call light and phone within reach, all lines and tubes intact. Overall patient demonstrated decreased independence and safety during completion of ADL/functional transfer/mobility tasks compared to PLOF.  Nursing updated on pt position and status following OT eval. Pt would benefit from continued skilled OT to increase safety and independence with completion of ADL/IADL tasks for functional independence and quality of life. Treatment: OT treatment provided this date includes:   Instruction, education and training on safe facilitation and adapted techniques for completion of ADLs. These include neuromuscular reeducation to facilitate balance/righting reactions,safe functional transfer techniques, proper positioning/alignment to improve interaction with environment and overall function and on adapted techniques/work simplification for completion of ADLs. Education provided on hand/feet placement with bed rails, walker and body mechanics for fall prevention. Extended time to complete all tasks, including skilled monitoring of patient's response during treatment session and vital signs. Prior to and at the end of session, environmental modifications / line management completed for patients safety and efficiency of treatment session. See above for further details. Rehab Potential: Fair for established goals     Patient / Family Goal:Increase standing tolerance      Patient and/or family were instructed on functional diagnosis, prognosis/goals and OT plan of care. Demonstrated fair understanding. Eval Complexity: Low  · History: Brief review of medical records and additional review of physical, cognitive, or psychosocial history related to current functional performance  · Exam: 3+ performance deficits  · Assistance/Modification: Mod assistance or modifications required to perform tasks. May have comorbidities that affect occupational performance.     Time In: 1142  Time Out: 1215  Total Treatment Time: 13    Min Units   OT Eval Low 97165  x  1   OT Eval Medium 17067      OT Eval High 59025      OT Re-Eval [id number]       Therapeutic Ex 90802       Therapeutic Activities 96373  13 1    ADL/Self Care 39812       Orthotic Management 29954       Manual 71950     Neuro Re-Ed 22105       Non-Billable Time          Evaluation Time additionally includes thorough review of current medical information, gathering information on past medical history/social history and prior level of function, interpretation of standardized testing/informal observation of tasks, assessment of data and development of plan of care and goals.             Theora Neither OTR/L; [id number]

## 2022-06-24 NOTE — DISCHARGE SUMMARY
[facility] Physician Discharge Summary       [facility]  [address] [address]  [phone]          Activity level: Up with assistance    Diet: Diet NPO Exceptions are: Sips of Water with Meds, Ice Chips  ADULT TUBE FEEDING; PEG; Standard with Fiber; Cyclic; 70; 2:34 AM; 0:97 AM; 100; Q 6 hours    Labs: CBC in about 1 week    Condition at discharge: Stable    Dispo: Skilled nursing facility      Patient ID:  Shilpa Diaz [id number]  [id number]  80 y.o.  1950    Admit date: 6/21/2022    Discharge date and time:  6/24/2022  1:32 PM    Admission Diagnoses: Principal Problem:    Gross hematuria  Active Problems:    Inguinal hernia    History of stroke    Acute blood loss anemia  Resolved Problems:    * No resolved hospital problems. *      Discharge Diagnoses: Principal Problem:    Gross hematuria  Active Problems:    Inguinal hernia    History of stroke    Acute blood loss anemia  Resolved Problems:    * No resolved hospital problems. *      Consults:  IP CONSULT TO UROLOGY    Procedures: Devine catheter placement by urology with manual irrigation    Hospital Course: This is a 67-year-old male admitted to the hospital from nursing facility after accidentally removing his Devine catheter. Removal was traumatic causing significant amount of hematuria. Devine catheter was replaced by urology, and manually irrigated until urine was free of blood. Hemoglobin was trended and did drop significantly, however stabilized and was 7.8 prior to discharge. Recommend CBC in about 1 week. Monitor closely for any signs of significant gross hematuria.      Discharge Exam:  Vitals:    06/23/22 1545 06/23/22 2000 06/23/22 2330 06/24/22 0745   BP: (!) 119/57 114/69 118/62 125/60   Pulse: 92 97 83 84   Resp: 18  20 18   Temp: 98.1 °F (36.7 °C)  98.3 °F (36.8 °C) 98.3 °F (36.8 °C)   TempSrc: Oral  Oral Oral   SpO2: 96%  100% 95%   Weight:       Height: General Appearance: alert and oriented to person, place and time and in no acute distress  Skin: warm and dry, mild pallor  Head: normocephalic and atraumatic  Eyes: Mild conjunctival pallor, pupils equal, round, and reactive to light, extraocular eye movements intact  Neck: neck supple and non tender without mass   Pulmonary/Chest: Nonlabored on room air. Clear to auscultation bilaterally  Cardiovascular: normal rate, normal S1 and S2 and no carotid bruits  Abdomen: soft, non-tender, non-distended, normal bowel sounds, no masses or organomegaly  Extremities: no cyanosis, no clubbing and no edema  Neurologic: Moderate dysarthria present  : Devine catheter in place with  clear yellow urine  I/O last 3 completed shifts: In: 1000 [Other:1000]  Out: 950 [Urine:950]  No intake/output data recorded. LABS:  Recent Labs     06/21/22 2243 06/22/22  0142 06/23/22  0739     --  136   K 5.3* 4.6 4.3     --  102   CO2 28  --  28   BUN 32*  --  37*   CREATININE 1.2  --  1.3*   GLUCOSE 97  --  110*   CALCIUM 8.7  --  8.3*       Recent Labs     06/21/22  2243 06/22/22  1422 06/23/22  0739 06/23/22  0739 06/23/22  1512 06/24/22  0755 06/24/22  1200   WBC 9.6  --  7.4  --   --   --   --    RBC 3.00*  --  2.74*  --   --   --   --    HGB 8.8*   < > 8.0*   < > 7.8* 7.2* 7.8*   HCT 27.9*   < > 26.0*   < > 26.1* 23.8* 25.6*   MCV 93.0  --  94.9  --   --   --   --    MCH 29.3  --  29.2  --   --   --   --    MCHC 31.5*  --  30.8*  --   --   --   --    RDW 14.4  --  14.7  --   --   --   --      --  172  --   --   --   --    MPV 12.6*  --  12.1*  --   --   --   --     < > = values in this interval not displayed. No results for input(s): POCGLU in the last 72 hours.     Imaging:  CT ABDOMEN PELVIS WO CONTRAST Additional Contrast? None    Result Date: 6/21/2022  EXAMINATION: CT OF THE ABDOMEN AND PELVIS WITHOUT CONTRAST 6/21/2022 9:43 pm TECHNIQUE: CT of the abdomen and pelvis was performed without the administration of intravenous contrast. Multiplanar reformatted images are provided for review. Automated exposure control, iterative reconstruction, and/or weight based adjustment of the mA/kV was utilized to reduce the radiation dose to as low as reasonably achievable. COMPARISON: CT abdomen and pelvis with contrast, 10/29/2021 HISTORY: ORDERING SYSTEM PROVIDED HISTORY: pelvic swelling, excessive hematuria, urinary retention TECHNOLOGIST PROVIDED HISTORY: Reason for exam:->pelvic swelling, excessive hematuria, urinary retention Additional Contrast?->None FINDINGS: Lower Chest: Since 10/29/2021, there is interval development calcified pulmonary consolidations in the dependent aspects of the lower lobes, which may be the result of a prior infectious/inflammatory process or contrast aspiration. The heart is normal in size. Small anterior pericardial effusion. No pleural effusion. Organs: Liver: Unremarkable. Gallbladder: Surgically absent. Pancreas: Unremarkable. Spleen:  Unremarkable. Adrenals: Unremarkable. Kidneys: The kidneys are mildly atrophic with multiple prominent cysts. Multiple prominent intrarenal calculi are also seen with the largest in the left kidney 2.1 x 0.6 cm extends from the major calices into the renal pelvis. No hydronephrosis. GI/Bowel: Severe pancolonic diverticulosis without go to GI bowel severe distal colonic diverticulosis without diverticulitis. No bowel wall thickening or obstruction. Normal appendix. Gastrostomy tube in situ. Pelvis: The Devine catheter extends into the bladder in terminates in the bladder dome, which is herniated into the left inguinal canal.  The prostate gland is mildly enlarged. Fat containing right inguinal hernia. No evidence of fat strangulation. Peritoneum/Retroperitoneum: Mild calcified atherosclerosis is seen in the aorta. No aneurysm. No lymphadenopathy. No free air or free fluid is seen. Bones/Soft Tissues:  The visualized bones are intact without fracture or focal lesion. Bones are diffusely sclerotic. 1. The Devine catheter and the balloon terminate in the bladder dome, which is herniated in the left inguinal canal.  The herniation of the bladder into the left inguinal canal is chronic and is seen as far back as at least 09/07/2015. 2. Prominent bilateral renal calculi. No evidence of hydronephrosis. 3. Interval development of calcified consolidations in the dependent aspects of the lower lobes, which may be due to infectious/inflammatory process or barium aspiration. 4. Fat containing right inguinal hernia. No evidence of fat strangulation. 5. Severe distal colonic diverticulosis without diverticulitis. 6. Diffuse bony sclerosis of unclear etiology. Clinical correlation is needed. RECOMMENDATIONS: Unavailable         Patient Instructions:   Current Discharge Medication List      START taking these medications    Details   metoprolol tartrate (LOPRESSOR) 25 MG tablet 1 tablet by PEG Tube route 2 times daily  Qty: 60 tablet, Refills: 3         CONTINUE these medications which have CHANGED    Details   amLODIPine (NORVASC) 2.5 MG tablet 1 tablet by PEG Tube route daily    Associated Diagnoses: Essential hypertension         CONTINUE these medications which have NOT CHANGED    Details   acetaminophen (TYLENOL) 650 MG/20.3ML SUSP Take 650 mg by mouth every 6 hours as needed for Pain      aspirin 81 MG chewable tablet 81 mg by PEG Tube route daily      atorvastatin (LIPITOR) 40 MG tablet 40 mg by PEG Tube route daily      divalproex (DEPAKOTE SPRINKLE) 125 MG capsule Take 125 mg by mouth 2 times daily Give 2 capsules via peg tube three times a day      docusate sodium (COLACE) 100 MG capsule 100 mg by PEG Tube route 2 times daily      melatonin 3 MG TABS tablet 5 mg by PEG Tube route nightly as needed      nitroGLYCERIN (NITROSTAT) 0.4 MG SL tablet Place 0.4 mg under the tongue every 5 minutes as needed for Chest pain up to max of 3 total doses.  If no relief after 1 dose, call 911. OLANZapine (ZYPREXA) 5 MG tablet 5 mg by PEG Tube route nightly      rivaroxaban (XARELTO) 20 MG TABS tablet 20 mg by PEG Tube route daily (with breakfast)      ferrous sulfate 220 (44 Fe) MG/5ML solution Take 220 mg by mouth daily Patient take 7.5mL via peg tube         STOP taking these medications       metoprolol succinate (TOPROL XL) 50 MG extended release tablet Comments:   Reason for Stopping:         hydrochlorothiazide (HYDRODIURIL) 25 MG tablet Comments:   Reason for Stopping:         sucralfate (CARAFATE) 1 GM tablet Comments:   Reason for Stopping:         famotidine (PEPCID) 20 MG tablet Comments:   Reason for Stopping:         lisinopril (PRINIVIL;ZESTRIL) 10 MG tablet Comments:   Reason for Stopping:         Dextromethorphan-Guaifenesin (MUCINEX DM MAXIMUM STRENGTH)  MG TB12 Comments:   Reason for Stopping:         levothyroxine (SYNTHROID) 75 MCG tablet Comments:   Reason for Stopping:         ferrous sulfate 325 (65 FE) MG tablet Comments:   Reason for Stopping:         pantoprazole (PROTONIX) 40 MG tablet Comments:   Reason for Stopping:         pyridoxine (B-6) 50 MG tablet Comments:   Reason for Stopping:         loratadine (CLARITIN) 10 MG tablet Comments:   Reason for Stopping:         carbamide peroxide (DEBROX) 6.5 % otic solution Comments:   Reason for Stopping:         chlorthalidone (HYGROTON) 25 MG tablet Comments:   Reason for Stopping:                 Note that less than 30 minutes was spent in preparing discharge papers, discussing discharge with patient, medication review, etc.    NOTE: This report was transcribed using voice recognition software. Every effort was made to ensure accuracy; however, inadvertent computerized transcription errors may be present.      Signed:  Electronically signed by Lobo Murillo DO on 6/24/2022 at 1:32 PM

## 2022-06-24 NOTE — DISCHARGE INSTR - COC
Continuity of Care Form    Patient Name: Luca Sim   :  1950  MRN:  [id number]    Admit date:  2022  Discharge date:  22    Code Status Order: Full Code   Advance Directives:      Admitting Physician:  Kyle Olson MD  PCP: Paola Santos MD    Discharging Nurse: [facility] Unit/Room#: 9437/3993-03  Discharging Unit Phone Number: [phone]    Emergency Contact:   Extended Emergency Contact Information  Primary Emergency Contact: Author Brittanie  Address: [address] [place] [address],[facility] [address] [facility] Phone: [phone]  Mobile Phone: [phone]  Relation: Other   needed? No  Secondary Emergency Contact: Salma [facility] [address] [facility] Phone: [phone]  Mobile Phone: [phone]  Relation: Other   needed?  No    Past Surgical History:  Past Surgical History:   Procedure Laterality Date    CHOLECYSTECTOMY  2015    HERNIA REPAIR      Dr Lucas Manual        Immunization History:   Immunization History   Administered Date(s) Administered    Influenza, Quadv, IM, (6 mo and older Fluzone, Flulaval, Fluarix and 3 yrs and older Afluria) 2016       Active Problems:  Patient Active Problem List   Diagnosis Code    Inguinal hernia K40.90    Acute renal failure ([facility] [address] N17.9    Acute cholecystitis K81.0    Acute blood loss anemia D62    Acquired hypothyroidism E03.9    Gross hematuria R31.0    Hypertension I10    History of stroke Z86.73       Isolation/Infection:   Isolation            No Isolation          Patient Infection Status       None to display            Nurse Assessment:  Last Vital Signs: /60   Pulse 84   Temp 98.3 °F (36.8 °C) (Oral)   Resp 18   Ht 5' 11\" (1.803 m)   Wt 150 lb (68 kg)   SpO2 95%   BMI 20.92 kg/m²     Last documented pain score (0-10 scale): Pain Level: 0  Last Weight:   Wt Readings from Last 1 Encounters:   22 150 lb (68 kg)     Mental Status:  oriented and alert    IV Access:  - None    Nursing Mobility/ADLs:  Walking   Dependent  Transfer  Dependent  Bathing  Dependent  Dressing  Dependent  Toileting  Dependent  Feeding  Dependent  Med Admin  Dependent  Med Delivery   crushed    Wound Care Documentation and Therapy:  Wound 06/22/22 Sacrum 3.3x2 (Active)   Dressing Status Clean;Dry; Intact 06/24/22 0730   Dressing/Treatment Foam 06/24/22 0730   Drainage Amount None 06/24/22 0730   Number of days: 2        Elimination:  Continence: Bowel: No  Bladder: No  Urinary Catheter: Insertion Date:    chronic shipman  Colostomy/Ileostomy/Ileal Conduit: No       Date of Last BM: 6/23/22    Intake/Output Summary (Last 24 hours) at 6/24/2022 1329  Last data filed at 6/23/2022 2017  Gross per 24 hour   Intake 100 ml   Output 150 ml   Net -50 ml     I/O last 3 completed shifts: In: 1000 [Other:1000]  Out: 950 [Urine:950]    Safety Concerns:     History of Falls (last 30 days) and At Risk for Falls    Impairments/Disabilities:      Vision    Nutrition Therapy:  Current Nutrition Therapy:   - Tube Feedings:  Standard with [email or web address] cc/h from 7 am to 5 am and H2O flush 100cc q6h    Routes of Feeding: Gastrostomy Tube  Liquids: No Liquids  Daily Fluid Restriction: no  Last Modified Barium Swallow with Video (Video Swallowing Test): not done    Treatments at the Time of Hospital Discharge:   Respiratory Treatments:   Oxygen Therapy:  is not on home oxygen therapy.   Ventilator:    - No ventilator support    Rehab Therapies: Physical Therapy and Occupational Therapy  Weight Bearing Status/Restrictions: No weight bearing restrictions  Other Medical Equipment (for information only, NOT a DME order):  hospital bed  Other Treatments:     Patient's personal belongings (please select all that are sent with patient):  Mihai    RN SIGNATURE:  {Esignature:[id number]}    CASE MANAGEMENT/SOCIAL WORK SECTION    Inpatient Status Date:     Readmission Risk Assessment

## 2022-06-24 NOTE — CARE COORDINATION
Patient to discharge back to [facility] at 7p via [facility]. RAPID COVID needs completed. STEVAN needs signed. Ambulance form on soft chart.       [facility]  N/N [phone]   Fax [phone]

## 2022-06-24 NOTE — PROGRESS NOTES
* No resolved hospital problems. *      His bladder is in a hernia sac and future catheterizations are going to be difficult and associated with trauma most likely  I recommend general surgery consultation to see if this hernia can be fixed so that subsequent urinary tract instrumentation is not difficult    Plan:    His urine is now clear I have no further plans for instrumentation he is not bleeding from the urinary tract at this point once his hemoglobin status has been established he could be discharged with the indwelling Devine.   For his convenience a general surgery consultation during this admission would be helpful to him    Dionne Mathur MD   [facility]  Urology

## 2022-06-24 NOTE — PROGRESS NOTES
Physical Therapy Initial Evaluation/Plan of Care    Room #:  2065/2077-26  Patient Name: Holly Cam  YOB: 1950  MRN: [id number]    Date of Service: 6/24/2022     Tentative placement recommendation: Delroy Sainz with Physical Therapy   Equipment recommendation: Equipment at Nursing Home      Evaluating Physical Therapist: Saira Le, [address]       Specific Provider Orders/Date/Referring Provider :  06/23/22 1815   PT eval and treat Start: 06/23/22 1815, End: 06/23/22 1815, ONE TIME, Standing Count: 1 Occurrences, R    Juan Manuel Gonsalves DO      Admitting Diagnosis:   Chronic anticoagulation [Z79.01]  Hematuria [R31.9]  Anemia, unspecified type [D64.9]  Hematuria, unspecified type [R31.9]    accidental traumatic removal of Devine catheter. Surgery: none  Visit Diagnoses       Codes    Chronic anticoagulation     Z79.01          Patient Active Problem List   Diagnosis    Inguinal hernia    [facility]    Acute cholecystitis    Acute blood loss anemia    Acquired hypothyroidism    Gross hematuria    Hypertension    History of stroke        ASSESSMENT of Current Deficits Patient exhibits decreased strength, balance, endurance and range of motion impairing functional mobility, transfers, gait , gait distance and tolerance to activity confusion, retropulsive in sitting, posterior lean with sit to stand. Fatigues quickly with 2 shuffling steps to Head of bed and needing to sit. Patient requires continued skilled physical therapy to address concerns listed above for increased safety and function at discharge.         PHYSICAL THERAPY  PLAN OF CARE       Physical therapy plan of care is established based on physician order,  patient diagnosis and clinical assessment    Current Treatment Recommendations:    -Bed Mobility: Lower extremity exercises  and Trunk control activities   -Sitting Balance: Hands on support to maintain midline , Facilitate active trunk muscle engagement  and Facilitate postural control in all planes   -Standing Balance: Perform strengthening exercises in standing to promote motor control with or without upper extremity support  and Instruct patient on adequate base of support to maintain balance  -Transfers: Provide instruction on proper hand and foot position for adequate transfer of weight onto lower extremities and use of gait device if needed, Cues for hand placement, technique and safety. Provide stabilization to prevent fall , Facilitate weight shift forward on to lower extremities and provide necessary stabilization of bilateral lower extremities  and Assist with extension of knees trunk and hip to accept weight transfer   -Gait: Gait training, Standing activities to improve: base of support, weight shift, weight bearing  and Pregait training to emphasize: Sequencing , Base of support, Weight shift, Device control, Upright and Safety   -Endurance: Utilize Supervised activities to increase level of endurance to allow for safe functional mobility including transfers and gait     PT long term treatment goals are located in below grid    Patient and or family understand(s) diagnosis, prognosis, and plan of care. Frequency of treatments: Patient will be seen  daily.        Prior Level of Function: Patient poor historian    Rehab Potential: fair  + for baseline    Past medical history:   Past Medical History:   Diagnosis Date    Acquired hypothyroidism 03/09/2016    Acute [facility] failure ([facility] [address]     Arthritis     Asthma     History of cardiovascular stress test 03/23/2016    lexiscan    Hypertension     [facility]roke [facility]rebrum[facility])      Past Surgical History:   Procedure Laterality Date    CHOLECYSTECTOMY  7/17/2015   [place] HERNIA REPAIR  2005    Dr Eugenia Allen:    Precautions: Bedrest with bathroom Privileges , falls, alarm and PEG tube ,    Social history: Patient lives in a skilled nursing facility [facility]   Equipment owned: Equipment at Nursing Home,  Poor historian    AM-PAC Basic Mobility        AM-PAC Mobility Inpatient   How much difficulty turning over in bed?: A Lot  How much difficulty sitting down on / standing up from a chair with arms?: A Lot  How much difficulty moving from lying on back to sitting on side of bed?: A Lot  How much help from another person moving to and from a bed to a chair?: A Lot  How much help from another person needed to walk in hospital room?: Total  How much help from another person for climbing 3-5 steps with a railing?: Total  AM-PAC Inpatient Mobility Raw Score : 10  AM-PAC Inpatient T-Scale Score : 32.29  Mobility Inpatient CMS 0-100% Score: 76.75  Mobility Inpatient CMS G-Code Modifier : CL    Nursing cleared patient for PT evaluation. The admitting diagnosis and active problem list as listed above have been reviewed prior to the initiation of this evaluation. OBJECTIVE;   Initial Evaluation  Date: 6/24/2022 Treatment Date:     Short Term/ Long Term   Goals   Was pt agreeable to Eval/treatment? Yes  To be met in 3 days   Pain level   0/10        Bed Mobility    Rolling: Moderate assist of 1    Supine to sit: Maximal assist of 1    Sit to supine: Moderate assist of  2    Scooting: Maximal assist of 1    Dependent of  2 to scoot to Head of bed in trendelenburg    Rolling: Moderate assist of 1    Supine to sit: Moderate assist of 1    Sit to supine: Moderate assist of 1    Scooting: Moderate assist of 1     Transfers Sit to stand: Maximal assist of  2 posterior lean chux to elevated hips. Bed elevated  Sit to stand:  Moderate assist of 1     Ambulation    2 side steps using  wheeled walker with Maximal assist of  2   Patient with shuffling steps and flexed posture and cues for sequencing, upright posture and safety   10 feet using  wheeled walker with Moderate assist of 1    Stair negotiation: ascended and descended   Not assessed          ROM Within functional limits  Slight knee flexion contracture R>L Increase range of motion 10% of affected joints    Strength BUE:  refer to OT eval  RLE:  3/5  LLE:  3/5  Increase strength in affected mm groups by 1/3 grade   Balance Sitting EOB:  poor maximal assist-retropulsive, improved upright with verbal and tactile cueing progressed to minimal/mod assist   Dynamic Standing:  poor  wheeled walker   Sitting EOB:  fair    Dynamic Standing: fair wheeled walker      Patient is Alert & Oriented x person and time not age and follows one step directions  Requires repeated instruction   Sensation:  Patient  denies numbness/tingling   Edema:  no   Endurance: poor +    Vitals: room air   Blood Pressure at rest  Blood Pressure during session    Heart Rate at rest 81 Heart Rate during session     SPO2 at rest 96%  SPO2 during session  %     Patient education  Patient educated on role of Physical Therapy, risks of immobility, safety and plan of care and  importance of mobility while in hospital      Patient response to education:   Pt verbalized understanding Pt demonstrated skill Pt requires further education in this area   Yes Partial Yes      Treatment:  Patient practiced and was instructed/facilitated in the following treatment: Patient assisted to edge of bed,   Sat edge of bed 15 minutes with Maximal assist of 1 to increase dynamic sitting balance and activity tolerance. progressed to min/mod assist last 5 minutes. Stood with assist of 2, marked posterior lean requiring use of chux to elevate hips. 2 side steps to Head of bed returned to bed due to fatigue. Dependent of  2 to scoot to Head of bed in trendelenburg. Positioned right side lying with wedges, pillow under bilateral lower extremities        Therapeutic Exercises:  ankle pumps  x 10 reps. At end of session, patient in bed with OT present call light and phone within reach,  all lines and tubes intact, nursing notified.       Patient would benefit from continued skilled Physical Therapy to improve functional

## 2022-07-20 ENCOUNTER — APPOINTMENT (OUTPATIENT)
Dept: GENERAL RADIOLOGY | Age: 72
DRG: 871 | End: 2022-07-20
Payer: MEDICARE

## 2022-07-20 ENCOUNTER — HOSPITAL ENCOUNTER (INPATIENT)
Age: 72
LOS: 6 days | Discharge: NURSING, REHAB OR LONG-TERM CARE | DRG: 871 | End: 2022-07-27
Attending: EMERGENCY MEDICINE | Admitting: INTERNAL MEDICINE
Payer: MEDICARE

## 2022-07-20 ENCOUNTER — APPOINTMENT (OUTPATIENT)
Dept: CT IMAGING | Age: 72
DRG: 871 | End: 2022-07-20
Payer: MEDICARE

## 2022-07-20 DIAGNOSIS — A41.9 SEPTICEMIA (HCC): Primary | ICD-10-CM

## 2022-07-20 DIAGNOSIS — N39.0 URINARY TRACT INFECTION ASSOCIATED WITH INDWELLING URETHRAL CATHETER, INITIAL ENCOUNTER (HCC): ICD-10-CM

## 2022-07-20 DIAGNOSIS — T83.511A URINARY TRACT INFECTION ASSOCIATED WITH INDWELLING URETHRAL CATHETER, INITIAL ENCOUNTER (HCC): ICD-10-CM

## 2022-07-20 DIAGNOSIS — J96.00 ACUTE RESPIRATORY FAILURE, UNSPECIFIED WHETHER WITH HYPOXIA OR HYPERCAPNIA (HCC): ICD-10-CM

## 2022-07-20 LAB
ALBUMIN SERPL-MCNC: 2.9 G/DL (ref 3.5–5.2)
ALP BLD-CCNC: 69 U/L (ref 40–129)
ALT SERPL-CCNC: <5 U/L (ref 0–40)
ANION GAP SERPL CALCULATED.3IONS-SCNC: 13 MMOL/L (ref 7–16)
AST SERPL-CCNC: 17 U/L (ref 0–39)
B.E.: -1.8 MMOL/L (ref -3–3)
BACTERIA: ABNORMAL /HPF
BASOPHILS ABSOLUTE: 0 E9/L (ref 0–0.2)
BASOPHILS RELATIVE PERCENT: 0.2 % (ref 0–2)
BILIRUB SERPL-MCNC: 0.7 MG/DL (ref 0–1.2)
BILIRUBIN URINE: NEGATIVE
BLOOD, URINE: ABNORMAL
BUN BLDV-MCNC: 38 MG/DL (ref 6–23)
BURR CELLS: ABNORMAL
CALCIUM SERPL-MCNC: 8.6 MG/DL (ref 8.6–10.2)
CHLORIDE BLD-SCNC: 104 MMOL/L (ref 98–107)
CLARITY: ABNORMAL
CO2: 22 MMOL/L (ref 22–29)
COLOR: YELLOW
CREAT SERPL-MCNC: 1.1 MG/DL (ref 0.7–1.2)
DELIVERY SYSTEMS: ABNORMAL
DEVICE: ABNORMAL
EKG ATRIAL RATE: 159 BPM
EKG Q-T INTERVAL: 330 MS
EKG QRS DURATION: 128 MS
EKG QTC CALCULATION (BAZETT): 538 MS
EKG R AXIS: 108 DEGREES
EKG T AXIS: 55 DEGREES
EKG VENTRICULAR RATE: 160 BPM
EOSINOPHILS ABSOLUTE: 0 E9/L (ref 0.05–0.5)
EOSINOPHILS RELATIVE PERCENT: 0 % (ref 0–6)
EPITHELIAL CELLS, UA: ABNORMAL /HPF
FIO2: 60
GFR AFRICAN AMERICAN: >60
GFR NON-AFRICAN AMERICAN: >60 ML/MIN/1.73
GLUCOSE BLD-MCNC: 160 MG/DL (ref 74–99)
GLUCOSE URINE: NEGATIVE MG/DL
HCO3: 22.6 MMOL/L (ref 22–26)
HCT VFR BLD CALC: 33.4 % (ref 37–54)
HEMOGLOBIN: 10.3 G/DL (ref 12.5–16.5)
HYPOCHROMIA: ABNORMAL
KETONES, URINE: NEGATIVE MG/DL
LACTIC ACID: 3.5 MMOL/L (ref 0.5–2.2)
LEUKOCYTE ESTERASE, URINE: ABNORMAL
LYMPHOCYTES ABSOLUTE: 0 E9/L (ref 1.5–4)
LYMPHOCYTES RELATIVE PERCENT: 2.1 % (ref 20–42)
MCH RBC QN AUTO: 28.4 PG (ref 26–35)
MCHC RBC AUTO-ENTMCNC: 30.8 % (ref 32–34.5)
MCV RBC AUTO: 92 FL (ref 80–99.9)
MONOCYTES ABSOLUTE: 1.29 E9/L (ref 0.1–0.95)
MONOCYTES RELATIVE PERCENT: 11.4 % (ref 2–12)
NEUTROPHILS ABSOLUTE: 10.41 E9/L (ref 1.8–7.3)
NEUTROPHILS RELATIVE PERCENT: 88.6 % (ref 43–80)
NITRITE, URINE: POSITIVE
O2 SATURATION: 96.2 % (ref 92–98.5)
OPERATOR ID: 30
OVALOCYTES: ABNORMAL
PCO2 37: 36.4 MMHG (ref 35–45)
PDW BLD-RTO: 15.9 FL (ref 11.5–15)
PH 37: 7.4 (ref 7.35–7.45)
PH UA: 6 (ref 5–9)
PLATELET # BLD: 179 E9/L (ref 130–450)
PMV BLD AUTO: 13.4 FL (ref 7–12)
PO2 37: 82.8 MMHG (ref 60–80)
POC SOURCE: ABNORMAL
POIKILOCYTES: ABNORMAL
POLYCHROMASIA: ABNORMAL
POSITIVE END EXP PRESS: 8 CMH2O
POTASSIUM REFLEX MAGNESIUM: 4.5 MMOL/L (ref 3.5–5)
PRESSURE SUPPORT: 6 CMH2O
PRO-BNP: 2423 PG/ML (ref 0–125)
PROTEIN UA: 30 MG/DL
RBC # BLD: 3.63 E12/L (ref 3.8–5.8)
RBC UA: ABNORMAL /HPF (ref 0–2)
SARS-COV-2, NAAT: NOT DETECTED
SODIUM BLD-SCNC: 139 MMOL/L (ref 132–146)
SPECIFIC GRAVITY UA: 1.02 (ref 1–1.03)
TOTAL PROTEIN: 6.5 G/DL (ref 6.4–8.3)
TROPONIN, HIGH SENSITIVITY: 45 NG/L (ref 0–11)
TROPONIN, HIGH SENSITIVITY: 64 NG/L (ref 0–11)
UROBILINOGEN, URINE: 0.2 E.U./DL
WBC # BLD: 11.7 E9/L (ref 4.5–11.5)
WBC UA: >20 /HPF (ref 0–5)

## 2022-07-20 PROCEDURE — 99285 EMERGENCY DEPT VISIT HI MDM: CPT

## 2022-07-20 PROCEDURE — 93005 ELECTROCARDIOGRAM TRACING: CPT

## 2022-07-20 PROCEDURE — 94660 CPAP INITIATION&MGMT: CPT

## 2022-07-20 PROCEDURE — 87635 SARS-COV-2 COVID-19 AMP PRB: CPT

## 2022-07-20 PROCEDURE — 81001 URINALYSIS AUTO W/SCOPE: CPT

## 2022-07-20 PROCEDURE — 87077 CULTURE AEROBIC IDENTIFY: CPT

## 2022-07-20 PROCEDURE — 6360000004 HC RX CONTRAST MEDICATION: Performed by: RADIOLOGY

## 2022-07-20 PROCEDURE — 96365 THER/PROPH/DIAG IV INF INIT: CPT

## 2022-07-20 PROCEDURE — 83880 ASSAY OF NATRIURETIC PEPTIDE: CPT

## 2022-07-20 PROCEDURE — 82803 BLOOD GASES ANY COMBINATION: CPT

## 2022-07-20 PROCEDURE — 84484 ASSAY OF TROPONIN QUANT: CPT

## 2022-07-20 PROCEDURE — 6360000002 HC RX W HCPCS

## 2022-07-20 PROCEDURE — 87088 URINE BACTERIA CULTURE: CPT

## 2022-07-20 PROCEDURE — 80053 COMPREHEN METABOLIC PANEL: CPT

## 2022-07-20 PROCEDURE — 85025 COMPLETE CBC W/AUTO DIFF WBC: CPT

## 2022-07-20 PROCEDURE — 6370000000 HC RX 637 (ALT 250 FOR IP)

## 2022-07-20 PROCEDURE — 87186 SC STD MICRODIL/AGAR DIL: CPT

## 2022-07-20 PROCEDURE — 71275 CT ANGIOGRAPHY CHEST: CPT

## 2022-07-20 PROCEDURE — 36415 COLL VENOUS BLD VENIPUNCTURE: CPT

## 2022-07-20 PROCEDURE — 71045 X-RAY EXAM CHEST 1 VIEW: CPT

## 2022-07-20 PROCEDURE — 87040 BLOOD CULTURE FOR BACTERIA: CPT

## 2022-07-20 PROCEDURE — 99291 CRITICAL CARE FIRST HOUR: CPT | Performed by: INTERNAL MEDICINE

## 2022-07-20 PROCEDURE — 96361 HYDRATE IV INFUSION ADD-ON: CPT

## 2022-07-20 PROCEDURE — 82533 TOTAL CORTISOL: CPT

## 2022-07-20 PROCEDURE — 2580000003 HC RX 258

## 2022-07-20 PROCEDURE — 83605 ASSAY OF LACTIC ACID: CPT

## 2022-07-20 RX ORDER — 0.9 % SODIUM CHLORIDE 0.9 %
1000 INTRAVENOUS SOLUTION INTRAVENOUS ONCE
Status: COMPLETED | OUTPATIENT
Start: 2022-07-20 | End: 2022-07-20

## 2022-07-20 RX ORDER — 0.9 % SODIUM CHLORIDE 0.9 %
1000 INTRAVENOUS SOLUTION INTRAVENOUS ONCE
Status: COMPLETED | OUTPATIENT
Start: 2022-07-20 | End: 2022-07-21

## 2022-07-20 RX ORDER — ALBUMIN (HUMAN) 12.5 G/50ML
25 SOLUTION INTRAVENOUS ONCE
Status: COMPLETED | OUTPATIENT
Start: 2022-07-20 | End: 2022-07-21

## 2022-07-20 RX ORDER — ACETAMINOPHEN 650 MG/1
650 SUPPOSITORY RECTAL ONCE
Status: COMPLETED | OUTPATIENT
Start: 2022-07-20 | End: 2022-07-20

## 2022-07-20 RX ADMIN — SODIUM CHLORIDE 1000 ML: 9 INJECTION, SOLUTION INTRAVENOUS at 23:00

## 2022-07-20 RX ADMIN — SODIUM CHLORIDE 1000 ML: 9 INJECTION, SOLUTION INTRAVENOUS at 21:00

## 2022-07-20 RX ADMIN — IOPAMIDOL 75 ML: 755 INJECTION, SOLUTION INTRAVENOUS at 23:07

## 2022-07-20 RX ADMIN — ACETAMINOPHEN 650 MG: 650 SUPPOSITORY RECTAL at 19:49

## 2022-07-20 RX ADMIN — CEFEPIME HYDROCHLORIDE 2000 MG: 2 INJECTION, POWDER, FOR SOLUTION INTRAVENOUS at 21:38

## 2022-07-20 ASSESSMENT — PAIN - FUNCTIONAL ASSESSMENT: PAIN_FUNCTIONAL_ASSESSMENT: NONE - DENIES PAIN

## 2022-07-20 NOTE — ED NOTES
resp at bedside to place pt on bipap      Raissa Marcial [facility]  07/20/22 5451

## 2022-07-20 NOTE — LETTER
[place] Department Medicaid  CERTIFICATION OF NECESSITY  FOR NON-EMERGENCY TRANSPORTATION   BY GROUND AMBULANCE      Individual Information   1. Name: Candy Morataya 2. [place] Medicaid Billing Number: [id number]   6. Address: [place] [address]      Transportation Provider Information   4. Provider Name:    5. [place] Medicaid Provider Number:  National Provider Identifier (NPI):      Certification  7. Criteria:  During transport, this individual requires:  [x] Medical treatment or continuous     supervision by an EMT. [x] The administration or regulation of oxygen by another person. [] Supervised protective restraint. 8. Period Beginning Date:    5. Length  [x] Not more than 1 day(s)  [] One Year     Additional Information Relevant to Certification   10. Comments or Explanations, If Necessary or Appropriate     Respiratory failure, Sepsis, UTI     Certifying Practitioner Information   11. Name of Practitioner: Long Willett. Priyanka Andrews MD   12. [place] Medicaid Provider Number, If Applicable:  [address] Provider Identifier (NPI):      Signature Information   14. Date of Signature: 7/21/2022 15. Name of Person Signing: LAURA Echevarria   16. Signature and Professional Designation: Electronically signed by LAURA Echevarria on 7/21/2022 at 9:26 AM       OD 37892  Rev. 7/2015         [facility] Encounter Date/Time: 7/20/2022 [address] [place] [address] Account: [de-identified]    MRN: [id number]    Patient: Candy Morataya    Contact Serial #: [id number]      ENCOUNTER          Patient Class: I Private Enc?   No Unit RM BD: [facility] ICU IC12/IC12-01   Hospital Service: MED   Encounter DX: Respiratory failure ([address]   ADM Provider: Rylan Fitch MD   Procedure:     ATT Provider: Henok Poole MD   REF Provider:        Admission DX: Respiratory failure ([address] Septicemia ([address] Acute respiratory failure, unspecified whether with hypoxia or hypercapnia ([address] Urinary tract infection associated with indwelling urethral catheter, initial encounter ([facility] [address] and DX codes: J96.90, A41.9, J96.00, T83.511A, N39.0      PATIENT                 Name: Ramin Escalante : 1950 (80 yrs)   Address: [address], [address]* Sex: Male   City: [place] [address]         Marital Status: Single   Employer: RETIRED         Jehovah's witness: Amish   Primary Care Provider: Agnes Sepulveda MD         Primary Phone: [phone] [address]   Contact Name Legal Guardian? Relationship to Patient Home Phone Work Phone   1. Alejandro Partida  2. Fernando Voss No  No Other  Other [phone]              GUARANTOR            Guarantor: Suzanna Stone     : 1950   Address: [facility];[address]* Sex: [place]     [place] [address]     Relation to Patient: Self       Home Phone: [phone]   Guarantor ID: [id number]       Work Phone:     Guarantor Employer: RETIRED         Status: RETIRED      COVERAGE        PRIMARY INSURANCE   Payor: [employer] Plan: [employer]   Payor Address: [address] Amrita Castillo [facility], [address] [place] [address] Number:   Insurance Type: INDEMNITY   Subscriber Name: Jatinder Reeder : 1950   Subscriber ID: [id number] Pat. Rel. to Sub: Self   SECONDARY INSURANCE   Payor: [address] Address: [address], [address] [facility]          Group Number: [id number] Type: INDEMNITY   Subscriber Name: Jatinder Reeder : 1950   Subscriber ID: [id number] Pat.  Rel. to Sub: SELF           CSN: [id number]

## 2022-07-20 NOTE — ED PROVIDER NOTES
ATTENDING PROVIDER ATTESTATION:     Renata Watt presented to the emergency department for evaluation of Respiratory Distress (Was 80% on RA at [facility], productive cough and tachycardic )   and was initially evaluated by the Medical Resident. See Original ED Note for H&P and ED course above. I have reviewed and discussed the case, including pertinent history (medical, surgical, family and social) and exam findings with the Medical Resident assigned to Renata Watt. I have personally performed and/or participated in the history, exam, medical decision making, and procedures and agree with all pertinent clinical information. I have reviewed my findings and recommendations with the assigned Medical Resident, Renata Watt and members of family present at the time of disposition. My findings/plan: The primary encounter diagnosis was Septicemia ([address] Diagnoses of Urinary tract infection associated with indwelling urethral catheter, initial encounter ([address] and Acute respiratory failure, unspecified whether with hypoxia or hypercapnia ([address] were also pertinent to this visit.   New Prescriptions    No medications on file     MD Thelma Tyson MD  07/20/22 2442

## 2022-07-21 PROBLEM — J96.90 RESPIRATORY FAILURE (HCC): Status: ACTIVE | Noted: 2022-07-21

## 2022-07-21 PROBLEM — E43 SEVERE PROTEIN-CALORIE MALNUTRITION (HCC): Chronic | Status: ACTIVE | Noted: 2022-07-21

## 2022-07-21 LAB
ALBUMIN SERPL-MCNC: 2.8 G/DL (ref 3.5–5.2)
ALP BLD-CCNC: 47 U/L (ref 40–129)
ALT SERPL-CCNC: <5 U/L (ref 0–40)
ANION GAP SERPL CALCULATED.3IONS-SCNC: 11 MMOL/L (ref 7–16)
AST SERPL-CCNC: 21 U/L (ref 0–39)
BILIRUB SERPL-MCNC: 0.8 MG/DL (ref 0–1.2)
BUN BLDV-MCNC: 41 MG/DL (ref 6–23)
CALCIUM SERPL-MCNC: 8.4 MG/DL (ref 8.6–10.2)
CHLORIDE BLD-SCNC: 106 MMOL/L (ref 98–107)
CO2: 22 MMOL/L (ref 22–29)
CORTISOL TOTAL: 49.63 MCG/DL (ref 2.68–18.4)
CREAT SERPL-MCNC: 1.1 MG/DL (ref 0.7–1.2)
GFR AFRICAN AMERICAN: >60
GFR NON-AFRICAN AMERICAN: >60 ML/MIN/1.73
GLUCOSE BLD-MCNC: 152 MG/DL (ref 74–99)
HCT VFR BLD CALC: 29.3 % (ref 37–54)
HEMOGLOBIN: 8.9 G/DL (ref 12.5–16.5)
L. PNEUMOPHILA SEROGP 1 UR AG: NORMAL
LACTIC ACID, SEPSIS: 1.8 MMOL/L (ref 0.5–1.9)
LV EF: 62 %
LVEF MODALITY: NORMAL
MAGNESIUM: 2.1 MG/DL (ref 1.6–2.6)
MCH RBC QN AUTO: 28.6 PG (ref 26–35)
MCHC RBC AUTO-ENTMCNC: 30.4 % (ref 32–34.5)
MCV RBC AUTO: 94.2 FL (ref 80–99.9)
PDW BLD-RTO: 16.1 FL (ref 11.5–15)
PHOSPHORUS: 3.7 MG/DL (ref 2.5–4.5)
PLATELET # BLD: 137 E9/L (ref 130–450)
PMV BLD AUTO: 13.3 FL (ref 7–12)
POTASSIUM SERPL-SCNC: 4.6 MMOL/L (ref 3.5–5)
RBC # BLD: 3.11 E12/L (ref 3.8–5.8)
SODIUM BLD-SCNC: 139 MMOL/L (ref 132–146)
STREP PNEUMONIAE ANTIGEN, URINE: NORMAL
TOTAL PROTEIN: 6.1 G/DL (ref 6.4–8.3)
TROPONIN, HIGH SENSITIVITY: 57 NG/L (ref 0–11)
WBC # BLD: 18.4 E9/L (ref 4.5–11.5)

## 2022-07-21 PROCEDURE — 87081 CULTURE SCREEN ONLY: CPT

## 2022-07-21 PROCEDURE — 89220 SPUTUM SPECIMEN COLLECTION: CPT

## 2022-07-21 PROCEDURE — 96366 THER/PROPH/DIAG IV INF ADDON: CPT

## 2022-07-21 PROCEDURE — C1751 CATH, INF, PER/CENT/MIDLINE: HCPCS

## 2022-07-21 PROCEDURE — 96376 TX/PRO/DX INJ SAME DRUG ADON: CPT

## 2022-07-21 PROCEDURE — 36410 VNPNXR 3YR/> PHY/QHP DX/THER: CPT

## 2022-07-21 PROCEDURE — 05HY33Z INSERTION OF INFUSION DEVICE INTO UPPER VEIN, PERCUTANEOUS APPROACH: ICD-10-PCS | Performed by: INTERNAL MEDICINE

## 2022-07-21 PROCEDURE — 83605 ASSAY OF LACTIC ACID: CPT

## 2022-07-21 PROCEDURE — 36415 COLL VENOUS BLD VENIPUNCTURE: CPT

## 2022-07-21 PROCEDURE — 99232 SBSQ HOSP IP/OBS MODERATE 35: CPT | Performed by: INTERNAL MEDICINE

## 2022-07-21 PROCEDURE — 96368 THER/DIAG CONCURRENT INF: CPT

## 2022-07-21 PROCEDURE — 2700000000 HC OXYGEN THERAPY PER DAY

## 2022-07-21 PROCEDURE — 6360000002 HC RX W HCPCS: Performed by: INTERNAL MEDICINE

## 2022-07-21 PROCEDURE — 94640 AIRWAY INHALATION TREATMENT: CPT

## 2022-07-21 PROCEDURE — 84100 ASSAY OF PHOSPHORUS: CPT

## 2022-07-21 PROCEDURE — 6360000002 HC RX W HCPCS

## 2022-07-21 PROCEDURE — 87449 NOS EACH ORGANISM AG IA: CPT

## 2022-07-21 PROCEDURE — 87206 SMEAR FLUORESCENT/ACID STAI: CPT

## 2022-07-21 PROCEDURE — 2000000000 HC ICU R&B

## 2022-07-21 PROCEDURE — P9047 ALBUMIN (HUMAN), 25%, 50ML: HCPCS | Performed by: NURSE PRACTITIONER

## 2022-07-21 PROCEDURE — 85027 COMPLETE CBC AUTOMATED: CPT

## 2022-07-21 PROCEDURE — 76937 US GUIDE VASCULAR ACCESS: CPT

## 2022-07-21 PROCEDURE — 2500000003 HC RX 250 WO HCPCS: Performed by: INTERNAL MEDICINE

## 2022-07-21 PROCEDURE — 2580000003 HC RX 258: Performed by: INTERNAL MEDICINE

## 2022-07-21 PROCEDURE — 87070 CULTURE OTHR SPECIMN AEROBIC: CPT

## 2022-07-21 PROCEDURE — 2580000003 HC RX 258: Performed by: NURSE PRACTITIONER

## 2022-07-21 PROCEDURE — 80053 COMPREHEN METABOLIC PANEL: CPT

## 2022-07-21 PROCEDURE — 02HV33Z INSERTION OF INFUSION DEVICE INTO SUPERIOR VENA CAVA, PERCUTANEOUS APPROACH: ICD-10-PCS | Performed by: INTERNAL MEDICINE

## 2022-07-21 PROCEDURE — 84484 ASSAY OF TROPONIN QUANT: CPT

## 2022-07-21 PROCEDURE — 94660 CPAP INITIATION&MGMT: CPT

## 2022-07-21 PROCEDURE — 96375 TX/PRO/DX INJ NEW DRUG ADDON: CPT

## 2022-07-21 PROCEDURE — 6370000000 HC RX 637 (ALT 250 FOR IP): Performed by: INTERNAL MEDICINE

## 2022-07-21 PROCEDURE — 2580000003 HC RX 258

## 2022-07-21 PROCEDURE — 6360000002 HC RX W HCPCS: Performed by: NURSE PRACTITIONER

## 2022-07-21 PROCEDURE — 36592 COLLECT BLOOD FROM PICC: CPT

## 2022-07-21 PROCEDURE — 96361 HYDRATE IV INFUSION ADD-ON: CPT

## 2022-07-21 PROCEDURE — 83735 ASSAY OF MAGNESIUM: CPT

## 2022-07-21 PROCEDURE — 93306 TTE W/DOPPLER COMPLETE: CPT

## 2022-07-21 PROCEDURE — 96367 TX/PROPH/DG ADDL SEQ IV INF: CPT

## 2022-07-21 RX ORDER — PANTOPRAZOLE SODIUM 40 MG/1
40 TABLET, DELAYED RELEASE ORAL
Status: DISCONTINUED | OUTPATIENT
Start: 2022-07-22 | End: 2022-07-24 | Stop reason: ALTCHOICE

## 2022-07-21 RX ORDER — HEPARIN SODIUM (PORCINE) LOCK FLUSH IV SOLN 100 UNIT/ML 100 UNIT/ML
1 SOLUTION INTRAVENOUS PRN
Status: DISCONTINUED | OUTPATIENT
Start: 2022-07-21 | End: 2022-07-27 | Stop reason: HOSPADM

## 2022-07-21 RX ORDER — ACETAMINOPHEN 325 MG/1
650 TABLET ORAL EVERY 6 HOURS PRN
Status: DISCONTINUED | OUTPATIENT
Start: 2022-07-21 | End: 2022-07-21 | Stop reason: SDUPTHER

## 2022-07-21 RX ORDER — SODIUM CHLORIDE 0.9 % (FLUSH) 0.9 %
5-40 SYRINGE (ML) INJECTION PRN
Status: DISCONTINUED | OUTPATIENT
Start: 2022-07-21 | End: 2022-07-21 | Stop reason: SDUPTHER

## 2022-07-21 RX ORDER — ATORVASTATIN CALCIUM 40 MG/1
40 TABLET, FILM COATED ORAL DAILY
Status: DISCONTINUED | OUTPATIENT
Start: 2022-07-21 | End: 2022-07-27 | Stop reason: HOSPADM

## 2022-07-21 RX ORDER — POLYETHYLENE GLYCOL 3350 17 G/17G
17 POWDER, FOR SOLUTION ORAL DAILY PRN
Status: DISCONTINUED | OUTPATIENT
Start: 2022-07-21 | End: 2022-07-27 | Stop reason: HOSPADM

## 2022-07-21 RX ORDER — SODIUM CHLORIDE 9 MG/ML
INJECTION, SOLUTION INTRAVENOUS CONTINUOUS
Status: DISCONTINUED | OUTPATIENT
Start: 2022-07-21 | End: 2022-07-22

## 2022-07-21 RX ORDER — SODIUM CHLORIDE 0.9 % (FLUSH) 0.9 %
5-40 SYRINGE (ML) INJECTION EVERY 12 HOURS SCHEDULED
Status: DISCONTINUED | OUTPATIENT
Start: 2022-07-21 | End: 2022-07-27 | Stop reason: HOSPADM

## 2022-07-21 RX ORDER — OLANZAPINE 5 MG/1
5 TABLET ORAL NIGHTLY
Status: DISCONTINUED | OUTPATIENT
Start: 2022-07-21 | End: 2022-07-27 | Stop reason: HOSPADM

## 2022-07-21 RX ORDER — ASPIRIN 81 MG/1
81 TABLET, CHEWABLE ORAL DAILY
Status: DISCONTINUED | OUTPATIENT
Start: 2022-07-21 | End: 2022-07-27 | Stop reason: HOSPADM

## 2022-07-21 RX ORDER — PROCHLORPERAZINE EDISYLATE 5 MG/ML
10 INJECTION INTRAMUSCULAR; INTRAVENOUS EVERY 6 HOURS PRN
Status: DISCONTINUED | OUTPATIENT
Start: 2022-07-21 | End: 2022-07-27 | Stop reason: HOSPADM

## 2022-07-21 RX ORDER — HEPARIN SODIUM (PORCINE) LOCK FLUSH IV SOLN 100 UNIT/ML 100 UNIT/ML
1 SOLUTION INTRAVENOUS EVERY 12 HOURS SCHEDULED
Status: DISCONTINUED | OUTPATIENT
Start: 2022-07-21 | End: 2022-07-27 | Stop reason: HOSPADM

## 2022-07-21 RX ORDER — IPRATROPIUM BROMIDE AND ALBUTEROL SULFATE 2.5; .5 MG/3ML; MG/3ML
1 SOLUTION RESPIRATORY (INHALATION) 4 TIMES DAILY
Status: DISCONTINUED | OUTPATIENT
Start: 2022-07-21 | End: 2022-07-27 | Stop reason: HOSPADM

## 2022-07-21 RX ORDER — MECOBALAMIN 5000 MCG
5 TABLET,DISINTEGRATING ORAL NIGHTLY PRN
Status: DISCONTINUED | OUTPATIENT
Start: 2022-07-21 | End: 2022-07-27 | Stop reason: HOSPADM

## 2022-07-21 RX ORDER — SODIUM CHLORIDE 0.9 % (FLUSH) 0.9 %
5-40 SYRINGE (ML) INJECTION PRN
Status: DISCONTINUED | OUTPATIENT
Start: 2022-07-21 | End: 2022-07-27 | Stop reason: HOSPADM

## 2022-07-21 RX ORDER — DIVALPROEX SODIUM 125 MG/1
250 CAPSULE, COATED PELLETS ORAL 3 TIMES DAILY
Status: DISCONTINUED | OUTPATIENT
Start: 2022-07-21 | End: 2022-07-27 | Stop reason: HOSPADM

## 2022-07-21 RX ORDER — SODIUM CHLORIDE 9 MG/ML
INJECTION, SOLUTION INTRAVENOUS PRN
Status: DISCONTINUED | OUTPATIENT
Start: 2022-07-21 | End: 2022-07-27 | Stop reason: HOSPADM

## 2022-07-21 RX ORDER — ACETAMINOPHEN 160 MG/5ML
650 SUSPENSION, ORAL (FINAL DOSE FORM) ORAL EVERY 6 HOURS PRN
Status: DISCONTINUED | OUTPATIENT
Start: 2022-07-21 | End: 2022-07-27 | Stop reason: HOSPADM

## 2022-07-21 RX ORDER — FERROUS SULFATE 300 MG/5ML
220 LIQUID (ML) ORAL DAILY
Status: DISCONTINUED | OUTPATIENT
Start: 2022-07-21 | End: 2022-07-27 | Stop reason: HOSPADM

## 2022-07-21 RX ORDER — NITROGLYCERIN 0.4 MG/1
0.4 TABLET SUBLINGUAL EVERY 5 MIN PRN
Status: DISCONTINUED | OUTPATIENT
Start: 2022-07-21 | End: 2022-07-27 | Stop reason: HOSPADM

## 2022-07-21 RX ORDER — ENOXAPARIN SODIUM 100 MG/ML
40 INJECTION SUBCUTANEOUS DAILY
Status: DISCONTINUED | OUTPATIENT
Start: 2022-07-21 | End: 2022-07-21

## 2022-07-21 RX ORDER — SODIUM CHLORIDE 0.9 % (FLUSH) 0.9 %
5-40 SYRINGE (ML) INJECTION EVERY 12 HOURS SCHEDULED
Status: DISCONTINUED | OUTPATIENT
Start: 2022-07-21 | End: 2022-07-21 | Stop reason: SDUPTHER

## 2022-07-21 RX ORDER — ONDANSETRON 2 MG/ML
4 INJECTION INTRAMUSCULAR; INTRAVENOUS EVERY 6 HOURS PRN
Status: DISCONTINUED | OUTPATIENT
Start: 2022-07-21 | End: 2022-07-21

## 2022-07-21 RX ORDER — DIVALPROEX SODIUM 125 MG/1
125 CAPSULE, COATED PELLETS ORAL 2 TIMES DAILY
Status: DISCONTINUED | OUTPATIENT
Start: 2022-07-21 | End: 2022-07-21

## 2022-07-21 RX ORDER — AMLODIPINE BESYLATE 5 MG/1
2.5 TABLET ORAL DAILY
Status: DISCONTINUED | OUTPATIENT
Start: 2022-07-22 | End: 2022-07-27 | Stop reason: HOSPADM

## 2022-07-21 RX ORDER — ACETAMINOPHEN 650 MG/1
650 SUPPOSITORY RECTAL EVERY 6 HOURS PRN
Status: DISCONTINUED | OUTPATIENT
Start: 2022-07-21 | End: 2022-07-27 | Stop reason: HOSPADM

## 2022-07-21 RX ORDER — DOCUSATE SODIUM 50 MG/5ML
100 LIQUID ORAL 2 TIMES DAILY
Status: DISCONTINUED | OUTPATIENT
Start: 2022-07-21 | End: 2022-07-27 | Stop reason: HOSPADM

## 2022-07-21 RX ORDER — DOCUSATE SODIUM 100 MG/1
100 CAPSULE, LIQUID FILLED ORAL 2 TIMES DAILY
Status: DISCONTINUED | OUTPATIENT
Start: 2022-07-21 | End: 2022-07-21

## 2022-07-21 RX ORDER — SODIUM CHLORIDE 9 MG/ML
INJECTION, SOLUTION INTRAVENOUS PRN
Status: DISCONTINUED | OUTPATIENT
Start: 2022-07-21 | End: 2022-07-21 | Stop reason: SDUPTHER

## 2022-07-21 RX ORDER — ONDANSETRON 4 MG/1
4 TABLET, ORALLY DISINTEGRATING ORAL EVERY 8 HOURS PRN
Status: DISCONTINUED | OUTPATIENT
Start: 2022-07-21 | End: 2022-07-21

## 2022-07-21 RX ADMIN — IPRATROPIUM BROMIDE AND ALBUTEROL SULFATE 1 AMPULE: .5; 2.5 SOLUTION RESPIRATORY (INHALATION) at 17:04

## 2022-07-21 RX ADMIN — MINERAL SUPPLEMENT IRON 300 MG / 5 ML STRENGTH LIQUID 100 PER BOX UNFLAVORED 220 MG: at 08:34

## 2022-07-21 RX ADMIN — METOPROLOL TARTRATE 25 MG: 25 TABLET, FILM COATED ORAL at 21:18

## 2022-07-21 RX ADMIN — CEFEPIME 2000 MG: 2 INJECTION, POWDER, FOR SOLUTION INTRAVENOUS at 08:33

## 2022-07-21 RX ADMIN — HYDROCORTISONE SODIUM SUCCINATE 50 MG: 100 INJECTION, POWDER, FOR SOLUTION INTRAMUSCULAR; INTRAVENOUS at 14:16

## 2022-07-21 RX ADMIN — METOPROLOL TARTRATE 25 MG: 25 TABLET, FILM COATED ORAL at 08:35

## 2022-07-21 RX ADMIN — LIDOCAINE HYDROCHLORIDE 5 ML: 10 SOLUTION INTRAVENOUS at 17:03

## 2022-07-21 RX ADMIN — HYDROCORTISONE SODIUM SUCCINATE 50 MG: 100 INJECTION, POWDER, FOR SOLUTION INTRAMUSCULAR; INTRAVENOUS at 22:45

## 2022-07-21 RX ADMIN — DOCUSATE SODIUM LIQUID 100 MG: 100 LIQUID ORAL at 21:17

## 2022-07-21 RX ADMIN — SODIUM CHLORIDE: 9 INJECTION, SOLUTION INTRAVENOUS at 14:27

## 2022-07-21 RX ADMIN — VANCOMYCIN HYDROCHLORIDE 750 MG: 10 INJECTION, POWDER, LYOPHILIZED, FOR SOLUTION INTRAVENOUS at 12:07

## 2022-07-21 RX ADMIN — SODIUM CHLORIDE, PRESERVATIVE FREE 10 ML: 5 INJECTION INTRAVENOUS at 08:36

## 2022-07-21 RX ADMIN — HYDROCORTISONE SODIUM SUCCINATE 50 MG: 100 INJECTION, POWDER, FOR SOLUTION INTRAMUSCULAR; INTRAVENOUS at 06:25

## 2022-07-21 RX ADMIN — VANCOMYCIN HYDROCHLORIDE 1750 MG: 10 INJECTION, POWDER, LYOPHILIZED, FOR SOLUTION INTRAVENOUS at 00:35

## 2022-07-21 RX ADMIN — HEPARIN 100 UNITS: 100 SYRINGE at 21:17

## 2022-07-21 RX ADMIN — Medication 10 ML: at 21:18

## 2022-07-21 RX ADMIN — ASPIRIN 81 MG CHEWABLE TABLET 81 MG: 81 TABLET CHEWABLE at 08:34

## 2022-07-21 RX ADMIN — OLANZAPINE 5 MG: 5 TABLET, FILM COATED ORAL at 21:18

## 2022-07-21 RX ADMIN — HYDROCORTISONE SODIUM SUCCINATE 100 MG: 100 INJECTION, POWDER, FOR SOLUTION INTRAMUSCULAR; INTRAVENOUS at 00:31

## 2022-07-21 RX ADMIN — NYSTATIN 500000 UNITS: 100000 SUSPENSION ORAL at 21:17

## 2022-07-21 RX ADMIN — RIVAROXABAN 20 MG: 20 TABLET, FILM COATED ORAL at 08:30

## 2022-07-21 RX ADMIN — DOCUSATE SODIUM LIQUID 100 MG: 100 LIQUID ORAL at 09:58

## 2022-07-21 RX ADMIN — DIVALPROEX SODIUM 250 MG: 125 CAPSULE, COATED PELLETS ORAL at 09:58

## 2022-07-21 RX ADMIN — DIVALPROEX SODIUM 250 MG: 125 CAPSULE, COATED PELLETS ORAL at 21:18

## 2022-07-21 RX ADMIN — NYSTATIN 500000 UNITS: 100000 SUSPENSION ORAL at 13:30

## 2022-07-21 RX ADMIN — CEFEPIME 2000 MG: 2 INJECTION, POWDER, FOR SOLUTION INTRAVENOUS at 21:26

## 2022-07-21 RX ADMIN — ATORVASTATIN CALCIUM 40 MG: 40 TABLET, FILM COATED ORAL at 08:35

## 2022-07-21 RX ADMIN — NYSTATIN 500000 UNITS: 100000 SUSPENSION ORAL at 08:34

## 2022-07-21 RX ADMIN — NYSTATIN 500000 UNITS: 100000 SUSPENSION ORAL at 17:30

## 2022-07-21 RX ADMIN — DIVALPROEX SODIUM 250 MG: 125 CAPSULE, COATED PELLETS ORAL at 14:52

## 2022-07-21 RX ADMIN — SODIUM CHLORIDE: 9 INJECTION, SOLUTION INTRAVENOUS at 04:33

## 2022-07-21 RX ADMIN — ALBUMIN (HUMAN) 25 G: 0.25 INJECTION, SOLUTION INTRAVENOUS at 00:31

## 2022-07-21 RX ADMIN — IPRATROPIUM BROMIDE AND ALBUTEROL SULFATE 1 AMPULE: .5; 2.5 SOLUTION RESPIRATORY (INHALATION) at 13:15

## 2022-07-21 ASSESSMENT — PAIN SCALES - GENERAL
PAINLEVEL_OUTOF10: 0

## 2022-07-21 NOTE — PROGRESS NOTES
Comprehensive Nutrition Assessment    Type and Reason for Visit:  Initial (TF)    Nutrition Recommendations/Plan:   Current cyclic TF regimen meeting only meeting 58% calorie needs and 56% pro needs. Recommending a continuous regimen to better meet estimated nutrient needs with patient showing muscle/fat wasting and 25% weight loss x 9 months. Standard w/ Fiber @ 55ml/hr to provide:   1320ml TV, 1980 calories, 84g pro, 1003ml water  Additional water flushes per critical care  Regimen meets 100% estimated nutrient needs. Malnutrition Assessment:  Malnutrition Status:  Severe malnutrition (07/21/22 1142)    Context:  Chronic Illness     Findings of the 6 clinical characteristics of malnutrition:  Energy Intake:  75% or less estimated energy requirements for 1 month or longer (EN dependent - not meeting needs)  Weight Loss:   (24% x 9 months)     Body Fat Loss:   (moderate) Orbital, Buccal region   Muscle Mass Loss:   (moderate) Clavicles (pectoralis & deltoids), Temples (temporalis)  Fluid Accumulation:  No significant fluid accumulation     Strength:  Not Performed    Nutrition Assessment:    Pt adm w/ resp failure 2/2 PNA w/ sepsis/UTI/pyelonephritis. Hx Dementia, CVA/ dysphagia s/p PEG. Note severe malnutrition. Will provide TF recs to better meet estimated nutrient needs.     Nutrition Related Findings:    A&Ox4, soft abd, +BS, I/Os WNL, PEG, +1 perineal edema   Wound Type: None       Current Nutrition Intake & Therapies:    Average Meal Intake: NPO  Average Supplements Intake: NPO  Diet NPO  ADULT TUBE FEEDING; PEG; Standard with Fiber; Cyclic; 70; 5:44 PM; 31:54 PM; 100; Q 6 hours  Current Tube Feeding (TF) Orders:  Feeding Route: PEG  Formula: Standard with Fiber  Schedule: Cyclic  Feeding Regimen: 70ml x 10 hours (2pm - 10pm)  Additives/Modulars: None  Water Flushes: 035let7 = 400ml free water  Current TF & Flush Orders Provides: clamped  Goal TF & Flush Orders Provides: 700ml TV, 1050 calories,

## 2022-07-21 NOTE — PLAN OF CARE
Problem: Discharge Planning  Goal: Discharge to home or other facility with appropriate resources  7/21/2022 1012 by Cristina Stanton RN  Outcome: Progressing  Flowsheets (Taken 7/21/2022 0800)  Discharge to home or other facility with appropriate resources: Identify barriers to discharge with patient and caregiver  7/21/2022 0558 by Chilo Elizabeth RN  Outcome: Progressing  7/21/2022 0303 by Chilo Elizabeth RN  Outcome: Progressing  7/21/2022 0301 by Chilo Elizabeth RN  Outcome: Progressing  Flowsheets (Taken 7/21/2022 0249)  Discharge to home or other facility with appropriate resources: Identify barriers to discharge with patient and caregiver     Problem: Skin/Tissue Integrity  Goal: Absence of new skin breakdown  Description: 1. Monitor for areas of redness and/or skin breakdown  2. Assess vascular access sites hourly  3. Every 4-6 hours minimum:  Change oxygen saturation probe site  4. Every 4-6 hours:  If on nasal continuous positive airway pressure, respiratory therapy assess nares and determine need for appliance change or resting period.   7/21/2022 1012 by Cristina Stanton RN  Outcome: Progressing  7/21/2022 0558 by Chilo Elizabeth RN  Outcome: Progressing  7/21/2022 0303 by Chilo Elizabeth RN  Outcome: Progressing  7/21/2022 0301 by Chilo Elizabeth RN  Outcome: Progressing     Problem: ABCDS Injury Assessment  Goal: Absence of physical injury  7/21/2022 1012 by Cristina Stanton RN  Outcome: Progressing  Flowsheets (Taken 7/21/2022 1006)  Absence of Physical Injury: Implement safety measures based on patient assessment  7/21/2022 0558 by Chilo Elizabeth RN  Outcome: Progressing  7/21/2022 0303 by Chilo Elizabeth RN  Outcome: Progressing  7/21/2022 0301 by Chilo Elizabeth RN  Outcome: Progressing     Problem: Safety - Adult  Goal: Free from fall injury  7/21/2022 1012 by Cristina Stanton RN  Outcome: Progressing  Flowsheets (Taken 7/21/2022 1006)  Free From Fall Injury: Instruct family/caregiver on patient safety   Based on caregiver fall risk screen, instruct family/caregiver to ask for assistance with transferring infant if caregiver noted to have fall risk factors  7/21/2022 0558 by Sheldon Lao RN  Outcome: Progressing  7/21/2022 0303 by Sheldon Lao RN  Outcome: Progressing  7/21/2022 0301 by Sheldon Lao RN  Outcome: Progressing     Problem: Pain  Goal: Verbalizes/displays adequate comfort level or baseline comfort level  7/21/2022 1012 by Araceli Carrillo RN  Outcome: Progressing  Flowsheets (Taken 7/21/2022 0800)  Verbalizes/displays adequate comfort level or baseline comfort level:   Encourage patient to monitor pain and request assistance   Assess pain using appropriate pain scale  7/21/2022 0558 by Sheldon Lao RN  Outcome: Progressing  Flowsheets (Taken 7/21/2022 0400)  Verbalizes/displays adequate comfort level or baseline comfort level:   Encourage patient to monitor pain and request assistance   Assess pain using appropriate pain scale  7/21/2022 0303 by Sheldon Lao RN  Outcome: Progressing  Flowsheets (Taken 7/21/2022 0125)  Verbalizes/displays adequate comfort level or baseline comfort level:   Encourage patient to monitor pain and request assistance   Assess pain using appropriate pain scale

## 2022-07-21 NOTE — DISCHARGE INSTR - COC
Continuity of Care Form    Patient Name: Candy Morataya   :  1950  MRN:  [id number]    Admit date:  2022  Discharge date:  [phone]    Code Status Order: Full Code   Advance Directives:     Admitting Physician:  Rylan Fitch MD  PCP: Ben Lowe MD    Discharging Nurse: [facility] Unit/Room#: IC12/IC12-01  Discharging Unit Phone Number: [phone]    Emergency Contact:   Extended Emergency Contact Information  Primary Emergency Contact: [facility]  Address: [address] [place] [address] Phone: [phone]  Mobile Phone: [phone]  Relation: Other   needed? No  Secondary Emergency Contact: Abran Cano[address] Phone: [phone]  Mobile Phone: [phone]  Relation: Other   needed?  No    Past Surgical History:  Past Surgical History:   Procedure Laterality Date    CHOLECYSTECTOMY  2015    HERNIA REPAIR      Dr Brianna Winston        Immunization History:   Immunization History   Administered Date(s) Administered    Influenza, Quadv, IM, (6 mo and older Fluzone, Flulaval, Fluarix and 3 yrs and older Afluria) 2016       Active Problems:  Patient Active Problem List   Diagnosis Code    Inguinal hernia K40.90    Acute renal failure ([facility] [address] N17.9    Acute cholecystitis K81.0    Acute blood loss anemia D62    Acquired hypothyroidism E03.9    Gross hematuria R31.0    Hypertension I10    History of stroke Z86.73    Respiratory failure ([facility] [address] J96.90       Isolation/Infection:   Isolation            No Isolation          Patient Infection Status       Infection Onset Added Last Indicated Last Indicated By Review Planned Expiration Resolved Resolved By    None active    Resolved    COVID-19 (Rule Out) 22 COVID-19, Rapid (Ordered)   22 Rule-Out Test Resulted            Nurse Assessment:  Last Vital Signs: BP (!) 100/55   Pulse 83   Temp 98 °F (36.7 °C) (Oral)   Resp 18   Ht 5' 11\" (1.803 m)   Wt 151 lb 6.4 oz (68.7 kg)   SpO2 94%   BMI 21.12 kg/m²     Last documented pain score (0-10 scale): Pain Level: 0  Last Weight:   Wt Readings from Last 1 Encounters:   07/21/22 151 lb 6.4 oz (68.7 kg)     Mental Status:  disoriented and alert    IV Access:  - None    Nursing Mobility/ADLs:  Walking   Dependent  Transfer  Dependent  Bathing  Dependent  Dressing  Dependent  Toileting  Dependent  Feeding  Dependent  Med Admin  Dependent  Med Delivery    thru g tube    Wound Care Documentation and Therapy:  Wound 06/22/22 Sacrum 3.3x2 (Active)   Dressing Status Clean;Dry; Intact 06/24/22 0730   Dressing/Treatment Foam 06/24/22 0730   Drainage Amount None 06/24/22 0730   Number of days: 29        Elimination:  Continence: Bowel: No  Bladder: No  Urinary Catheter:  07/22/2022    Colostomy/Ileostomy/Ileal Conduit: No       Date of Last BM: 7/27/2022      Intake/Output Summary (Last 24 hours) at 7/21/2022 7688  Last data filed at 7/21/2022 0600  Gross per 24 hour   Intake --   Output 400 ml   Net -400 ml     I/O last 3 completed shifts:  In: -   Out: 400 [Urine:400]    Safety Concerns: At Risk for Falls and Aspiration Risk    Impairments/Disabilities:      Speech    Nutrition Therapy:  Current Nutrition Therapy:   - Oral Diet:  NPO    Routes of Feeding: Gastrostomy Tube  Liquids: No Liquids  Daily Fluid Restriction: no  Last Modified Barium Swallow with Video (Video Swallowing Test): not done    Treatments at the Time of Hospital Discharge:   Respiratory Treatments: yes    Oxygen Therapy:  is not on home oxygen therapy.   Ventilator:    - No ventilator support    Rehab Therapies: Physical Therapy and Occupational Therapy  Weight Bearing Status/Restrictions: No weight bearing restrictions  Other Medical Equipment (for information only, NOT a DME order):  na  Other Treatments: na        Patient's personal belongings (please select all that are sent with patient):  Mihai    RN SIGNATURE:  Electronically signed by Justin Gaitan RN on 7/27/22 at 2:30 PM EDT    CASE MANAGEMENT/SOCIAL WORK SECTION    Inpatient Status Date: 7/21/22    Readmission Risk Assessment Score:  Readmission Risk              Risk of Unplanned Readmission:  [id number]           Discharging to Facility/ Agency   Name:   [phone]    Dialysis Facility (if applicable)   Name:  Address:  Dialysis Schedule:  Phone:  Fax:    / signature: Electronically signed by So Stoll RN-BC on 7/21/2022 at 9:29 AM      PHYSICIAN SECTION      Prognosis: Good    Condition at Discharge: Stable     Rehab Potential (if transferring to Rehab): Good    Recommended Labs or Other Treatments After Discharge: bmp in 1 week    Physician Certification: I certify the above information and transfer of Lidia Barba is necessary for the continuing treatment of the diagnosis listed and that he requires Acute Rehab or skilled facility for less 30 days.      Update Admission H&P: No change in H&P    PHYSICIAN SIGNATURE:  Electronically signed by Usha Trevino MD on 7/27/22 at 1:54 PM EDT

## 2022-07-21 NOTE — PROCEDURES
JAMIE Power PICC Placement 7/21/2022    Product number: [id number]   Lot Number: [id number]      Ultrasound: yes   L Brachial      Upper Arm Circumference: 26cm    Size: 5.5FR    Exposed Length:     Internal Length: 15cm   Cut:    Vein Measurement: 0.5cm    Álvaro Vu RN  7/21/2022  8:28 PM    No complications  Brisk blood return  Flushed well  Pt tolerated well  RN notified

## 2022-07-21 NOTE — H&P
Systems - 12-point review of systems has been reviewed and is otherwise negative except as listed in the HPI    Past Medical History:   Diagnosis Date    Acquired hypothyroidism 03/09/2016    Acute renal failure ([address]     Arthritis     Asthma     History of cardiovascular stress test 03/23/2016    lexiscan    Hypertension     Stroke (cerebrum) [facility])      Past Surgical History:   Procedure Laterality Date    CHOLECYSTECTOMY  7/17/2015    HERNIA REPAIR  2005    Dr Ray Short      Prior to Admission medications    Medication Sig Start Date End Date Taking? Authorizing Provider   metoprolol tartrate (LOPRESSOR) 25 MG tablet 1 tablet by PEG Tube route 2 times daily 6/24/22   Johnson Boyer,    amLODIPine (NORVASC) 2.5 MG tablet 1 tablet by PEG Tube route daily 6/24/22   Andrzej King,    acetaminophen (TYLENOL) 650 MG/20.3ML SUSP Take 650 mg by mouth every 6 hours as needed for Pain    Historical Provider, MD   aspirin 81 MG chewable tablet 81 mg by PEG Tube route daily    Historical Provider, MD   atorvastatin (LIPITOR) 40 MG tablet 40 mg by PEG Tube route daily    Historical Provider, MD   divalproex (DEPAKOTE SPRINKLE) 125 MG capsule Take 125 mg by mouth 2 times daily Give 2 capsules via peg tube three times a day    Historical Provider, MD   docusate sodium (COLACE) 100 MG capsule 100 mg by PEG Tube route 2 times daily    Historical Provider, MD   melatonin 3 MG TABS tablet 5 mg by PEG Tube route nightly as needed    Historical Provider, MD   nitroGLYCERIN (NITROSTAT) 0.4 MG SL tablet Place 0.4 mg under the tongue every 5 minutes as needed for Chest pain up to max of 3 total doses. If no relief after 1 dose, call 911.     Historical Provider, MD   OLANZapine (ZYPREXA) 5 MG tablet 5 mg by PEG Tube route nightly    Historical Provider, MD   rivaroxaban (XARELTO) 20 MG TABS tablet 20 mg by PEG Tube route daily (with breakfast)    Historical Provider, MD   ferrous sulfate 220 (44 Fe) MG/5ML solution Take 220 mg by mouth daily Patient take 7.5mL via peg tube    Historical Provider, MD     Allergies  Elemental sulfur and Penicillins    Social History   reports that he quit smoking about 12 years ago. He has quit using smokeless tobacco. He reports that he does not drink alcohol and does not use drugs. Family History  family history includes Heart Disease in his mother; Heart Failure in his father and mother.     Objective  Physical Exam   Vitals: BP (!) 112/58   Pulse (!) 123   Temp (!) 100.9 °F (38.3 °C) (Axillary)   Resp 26   Wt 150 lb (68 kg)   SpO2 100%   BMI 20.92 kg/m²   General: Demented, short of breath on BiPAP  Skin: generally warm, dry, and intact, with normal color  HEENT: normocephalic, atraumatic, no gross abnormalities  Respiratory: clear to auscultation bilaterally without respiratory distress  Cardiovascular: regular rate and rhythm without murmur / rub / gallop  Abdominal: soft, nontender, nondistended, normoactive bowel sounds  Extremities: no obvious edema or deformity  Neurologic: awake, alert, no gross deficits  Psychiatric: normal affect, cooperative    *Available labs, imaging studies, microbiologic studies, cardiac studies have been reviewed  55 minutes of critical care  Electronically signed by Sofiya Crump MD on 7/20/2022 at 11:59 PM

## 2022-07-21 NOTE — ED PROVIDER NOTES
[address] ENCOUNTER      Pt Name: Lidia Barba  MRN: [id number]  [place] 1950  Date of evaluation: 7/20/2022      CHIEF COMPLAINT       Chief Complaint   Patient presents with    Respiratory Distress     Was 80% on RA at [facility], productive cough and tachycardic         HPI  Lidia Barba is a 80 y.o. male  with PMHx of stroke complicated by dysphagia (sp feeding tube), presents  from [facility] with worsening sob. Patient was 80% on RA with increased worked of breathing, productive cough and tachycardia. Patient was placed on Bipap on arrival with significant improvement of oxygen saturation and work of breathing. Symptoms are severe in severity with no alleviating or exacerbating factors. Spoke to nursing facility who states patient is in palliative and hospice care but full code. Unable to assess ROS given respiratory distress      Except as noted above the remainder of the review of systems was reviewed and negative. Review of Systems   Unable to perform ROS: Severe respiratory distress      Physical Exam  Vitals reviewed. Constitutional:       General: He is in acute distress. Appearance: Normal appearance. He is normal weight. He is ill-appearing and toxic-appearing. He is not diaphoretic. HENT:      Head: Normocephalic and atraumatic. Right Ear: External ear normal.      Left Ear: External ear normal.      Nose: Nose normal. No congestion or rhinorrhea. Mouth/Throat:      Mouth: Mucous membranes are dry. Pharynx: Oropharynx is clear. No oropharyngeal exudate or posterior oropharyngeal erythema. Eyes:      Extraocular Movements: Extraocular movements intact. Conjunctiva/sclera: Conjunctivae normal.      Pupils: Pupils are equal, round, and reactive to light. Cardiovascular:      Rate and Rhythm: Regular rhythm. Tachycardia present. Pulses: Normal pulses.    Pulmonary:      Effort: Respiratory distress present. Breath sounds: No stridor. Rhonchi present. No wheezing or rales. Chest:      Chest wall: No tenderness. Abdominal:      General: Abdomen is flat. Bowel sounds are normal. There is no distension. Palpations: Abdomen is soft. Tenderness: There is no abdominal tenderness. There is no right CVA tenderness, left CVA tenderness or guarding. Hernia: No hernia is present. Comments: G tube   Genitourinary:     Comments: shipman  Musculoskeletal:         General: Normal range of motion. Cervical back: Normal range of motion. Right lower leg: No edema. Left lower leg: No edema. Skin:     General: Skin is warm and dry. Capillary Refill: Capillary refill takes less than 2 seconds. Neurological:      General: No focal deficit present. Mental Status: He is alert and oriented to person, place, and time. Mental status is at baseline. Psychiatric:         Mood and Affect: Mood normal.         Behavior: Behavior normal.         Thought Content: Thought content normal.         Judgment: Judgment normal.        Procedures     MDM     Amount and/or Complexity of Data Reviewed  Decide to obtain previous medical records or to obtain history from someone other than the patient: yes            80 y.o. male  with PMHx of stroke complicated by dysphagia (sp feeding tube), presents  from [facility] with worsening sob. Patient was 80% on RA with increased worked of breathing, productive cough and tachycardia. While in the ED patient was ill appearing, febrile, in respiratory distress. Tachycardiac. Physical exam remarkable for increased work of breathing, diminished breath sounds and scattered rhonchi. He was placed on BIPAP with significant improvement of pulse ox and work of breathing  Labs remarkable for UTI, elevated lactic, elevated trop with delta trop trending up. EKG wide QRS tachycardia with RBBB, sign of acute ischemia.  CXR remarkable for nodular density. Patient was not able to tolerate laying flat for CTA pulm. Patient received fluids, cefepime, vanc, rectal tylenol with some symptomatic relief. Patient admitted to the Medicine team under ICU for further management. Patient in agreement with plan of admission. ED Course as of 07/20/22 2236 Wed Jul 20, 2022   1936 Nodular density in the right parahilar region of 2.5 cm. Further evaluation  with CT chest recommended [TC]   2140 EKG: This EKG is signed by emergency department physician. Rate: 160  Rhythm: wide QRS tachycardia and with Right BBB  Interpretation: non-specific EKG  Comparison: changes as compared to patient's most recent EKG      [TC]      ED Course User Index  [TC] Sage Omer MD       --------------------------------------------- PAST HISTORY ---------------------------------------------  Past Medical History:  has a past medical history of Acquired hypothyroidism, Acute renal failure ([facility] Arthritis, Asthma, History of cardiovascular stress test, Hypertension, and Stroke (cerebrum) ([facility] [address] Past Surgical History:  has a past surgical history that includes hernia repair (2005) and Cholecystectomy (7/17/2015). Social History:  reports that he quit smoking about 12 years ago. He has quit using smokeless tobacco. He reports that he does not drink alcohol and does not use drugs. Family History: family history includes Heart Disease in his mother; Heart Failure in his father and mother. The patients home medications have been reviewed.     Allergies: Elemental sulfur and Penicillins    -------------------------------------------------- RESULTS -------------------------------------------------    LABS:  Results for orders placed or performed during the hospital encounter of 07/20/22   COVID-19, Rapid    Specimen: Nasopharyngeal Swab   Result Value Ref Range    SARS-CoV-2, NAAT Not Detected Not Detected   CBC with Auto Differential   Result Value Ref Range    WBC 11.7 (H) 4.5 - 11.5 E9/L    RBC 3.63 (L) 3.80 - 5.80 E12/L    Hemoglobin 10.3 (L) 12.5 - 16.5 g/dL    Hematocrit 33.4 (L) 37.0 - 54.0 %    MCV 92.0 80.0 - 99.9 fL    MCH 28.4 26.0 - 35.0 pg    MCHC 30.8 (L) 32.0 - 34.5 %    RDW 15.9 (H) 11.5 - 15.0 fL    Platelets 664 959 - 255 E9/L    MPV 13.4 (H) 7.0 - 12.0 fL    Neutrophils % 88.6 (H) 43.0 - 80.0 %    Lymphocytes % 2.1 (L) 20.0 - 42.0 %    Monocytes % 11.4 2.0 - 12.0 %    Eosinophils % 0.0 0.0 - 6.0 %    Basophils % 0.2 0.0 - 2.0 %    Neutrophils Absolute 10.41 (H) 1.80 - 7.30 E9/L    Lymphocytes Absolute 0.00 (L) 1.50 - 4.00 E9/L    Monocytes Absolute 1.29 (H) 0.10 - 0.95 E9/L    Eosinophils Absolute 0.00 (L) 0.05 - 0.50 E9/L    Basophils Absolute 0.00 0.00 - 0.20 E9/L    Polychromasia 1+     Hypochromia 1+     Poikilocytes 1+     [place] Cells 1+     Ovalocytes 1+    Comprehensive Metabolic Panel w/ Reflex to MG   Result Value Ref Range    Sodium 139 132 - 146 mmol/L    Potassium reflex Magnesium 4.5 3.5 - 5.0 mmol/L    Chloride 104 98 - 107 mmol/L    CO2 22 22 - 29 mmol/L    Anion Gap 13 7 - 16 mmol/L    Glucose 160 (H) 74 - 99 mg/dL    BUN 38 (H) 6 - 23 mg/dL    Creatinine 1.1 0.7 - 1.2 mg/dL    GFR Non-African American >60 >=60 mL/min/1.73    GFR African American >60     Calcium 8.6 8.6 - 10.2 mg/dL    Total Protein 6.5 6.4 - 8.3 g/dL    Albumin 2.9 (L) 3.5 - 5.2 g/dL    Total Bilirubin 0.7 0.0 - 1.2 mg/dL    Alkaline Phosphatase 69 40 - 129 U/L    ALT <5 0 - 40 U/L    AST 17 0 - 39 U/L   Troponin   Result Value Ref Range    Troponin, High Sensitivity 45 (H) 0 - 11 ng/L   Brain Natriuretic Peptide   Result Value Ref Range    Pro-BNP 2,423 (H) 0 - 125 pg/mL   Urinalysis with Microscopic   Result Value Ref Range    Color, UA Yellow Straw/Yellow    Clarity, UA CLOUDY (A) Clear    Glucose, Ur Negative Negative mg/dL    Bilirubin Urine Negative Negative    Ketones, Urine Negative Negative mg/dL    Specific Gravity, UA 1.020 1.005 - 1.030    Blood, Urine MODERATE (A) Negative    pH, UA 6.0 5.0 - 9.0    Protein, UA 30 (A) Negative mg/dL    Urobilinogen, Urine 0.2 <2.0 E.U./dL    Nitrite, Urine POSITIVE (A) Negative    Leukocyte Esterase, Urine LARGE (A) Negative    WBC, UA >20 (A) 0 - 5 /HPF    RBC, UA 10-20 (A) 0 - 2 /HPF    Epithelial Cells, UA FEW /HPF    Bacteria, UA MANY (A) None Seen /HPF   Lactic Acid   Result Value Ref Range    Lactic Acid 3.5 (HH) 0.5 - 2.2 mmol/L   Arterial Blood Gas, Respiratory Only   Result Value Ref Range    POC Source Arterial     FIO2 60.0     PH 37 7.401 7.350 - 7.450    PCO2 37 36.4 35.0 - 45.0 mmHg    PO2 37 82.8 (H) 60.0 - 80.0 mmHg    HCO3 22.6 22.0 - 26.0 mmol/L    B.E. -1.8 -3.0 - 3.0 mmol/L    O2 Sat 96.2 92.0 - 98.5 %     ID 30     DEVICE 17,310,521,400,678     Pressure Support 6 cmH2O    Positive End EXP Press 8 cmH2O    Delivery Systems BiPAP    Troponin   Result Value Ref Range    Troponin, High Sensitivity 64 (H) 0 - 11 ng/L   EKG 12 Lead   Result Value Ref Range    Ventricular Rate 160 BPM    Atrial Rate 159 BPM    QRS Duration 128 ms    Q-T Interval 330 ms    QTc Calculation (Bazett) 538 ms    R Axis 108 degrees    T Axis 55 degrees       RADIOLOGY:  XR CHEST PORTABLE   Final Result   Nodular density in the right parahilar region of 2.5 cm. Further evaluation   with CT chest recommended. CTA PULMONARY W CONTRAST    (Results Pending)       ------------------------- NURSING NOTES AND VITALS REVIEWED ---------------------------  Date / Time Roomed:  7/20/2022  6:39 PM  ED Bed Assignment:  05/05    The nursing notes within the ED encounter and vital signs as below have been reviewed.      Patient Vitals for the past 24 hrs:   BP Temp Temp src Pulse Resp SpO2 Weight   07/20/22 2110 (!) 101/58 (!) 100.9 °F (38.3 °C) Axillary (!) 146 26 99 % --   07/20/22 2030 94/66 -- -- (!) 160 (!) 39 98 % --   07/20/22 1955 118/80 -- -- (!) 164 (!) 31 96 % --   07/20/22 1846 -- -- -- (!) 157 24 94 % --   07/20/22 1844 -- -- -- -- -- 94 %

## 2022-07-21 NOTE — ED NOTES
Pt placed on NRB for CTA. SpO2 remained at 97%, pt in no apparent distress.       Peyton Mcdonnell RN  07/20/22 2602

## 2022-07-21 NOTE — PROGRESS NOTES
Pulmonary/Critical Care Progress Note    We are following patient for septic shock, pneumonia, urinary tract infection, history of stroke, status post PEG tube placement, pyelonephritis    SUBJECTIVE:  The patient says he feels better today than he did last night. He is more awake alert and easier to oxygenate. He is not on pressors with a blood pressure of 103/53  Heart rate in sinus rhythm at 81/min. Saturation is 99% with respiratory rate of 19 to 20/min. He is so far tolerating IV vancomycin and cefepime without difficulty. Blood and urine cultures so far negative. The urinalysis strongly suggests polymicrobial infection especially because of his indwelling Devine catheter which is already been changed. Patient has had an old echocardiogram the most recent of which was March 2016. We will have this repeated tomorrow.     MEDICATIONS:   vancomycin  750 mg IntraVENous Q12H    [START ON 7/22/2022] amLODIPine  2.5 mg PEG Tube Daily    aspirin  81 mg PEG Tube Daily    atorvastatin  40 mg PEG Tube Daily    ferrous sulfate  220 mg Oral Daily    metoprolol tartrate  25 mg PEG Tube BID    OLANZapine  5 mg PEG Tube Nightly    rivaroxaban  20 mg PEG Tube Daily with breakfast    nystatin  5 mL Mouth/Throat 4x Daily    docusate sodium  100 mg Per G Tube BID    divalproex  250 mg Oral TID    hydrocortisone sodium succinate PF  50 mg IntraVENous Q8H    lidocaine  5 mL IntraDERmal Once    sodium chloride flush  5-40 mL IntraVENous 2 times per day    heparin flush  1 mL IntraVENous 2 times per day    [START ON 7/22/2022] pantoprazole  40 mg Oral QAM AC    ipratropium-albuterol  1 ampule Inhalation 4x daily    cefepime  2,000 mg IntraVENous Q12H      sodium chloride 75 mL/hr at 07/21/22 1200    sodium chloride       polyethylene glycol, [DISCONTINUED] acetaminophen **OR** acetaminophen, prochlorperazine, acetaminophen, melatonin, nitroGLYCERIN, sodium chloride flush, sodium chloride, heparin flush      REVIEW OF SYSTEMS:  Constitutional: Positive for fever, denies weight loss, night sweats, and fatigue  Skin: Denies pigmentation, dark lesions, and rashes   HEENT: Denies hearing loss, tinnitus, ear drainage, epistaxis, sore throat, and hoarseness. Cardiovascular: Denies palpitations, chest pain, and chest pressure. Respiratory: Denies cough, dyspnea at rest, hemoptysis, apnea, and choking. Gastrointestinal: Denies nausea, vomiting, poor appetite, diarrhea, heartburn or reflux  Genitourinary: Denies dysuria, frequency, urgency or hematuria  Musculoskeletal: Denies myalgias, muscle weakness, and bone pain  Neurological: Denies dizziness, vertigo, headache, and focal weakness  Psychological: Denies anxiety and depression  Endocrine: Denies heat intolerance and cold intolerance  Hematopoietic/Lymphatic: Denies bleeding problems and blood transfusions    OBJECTIVE:  Vitals:    07/21/22 1300   BP: (!) 103/53   Pulse: 77   Resp: 14   Temp:    SpO2: 99%        O2 Flow Rate (L/min): 2 L/min  O2 Device: Nasal cannula    PHYSICAL EXAM:  Constitutional: No fever, chills, diaphoresis  Skin: Slightly decreased turgor but no skin breakdown  HEENT: Unremarkable  Neck: No JVD, lymphadenopathy, thyromegaly  Cardiovascular: S1, S2 regular. No S3 murmurs rubs present  Respiratory: Inspiratory crackles over both posterior lower lung fields. Few soft expiratory wheezes are present. No pleural rubs detected  Gastrointestinal: Soft, flat, nontender. PEG tube is in good position  Genitourinary: No CVA tenderness  Extremities: No clubbing, cyanosis, or edema  Neurological: Awake, alert but with slurred speech and flattened right nasolabial fold with residual right-sided weakness  Psychological: Appropriate affect.   In relatively good spirits    LABS:  WBC   Date Value Ref Range Status   07/21/2022 18.4 (H) 4.5 - 11.5 E9/L Final   07/20/2022 11.7 (H) 4.5 - 11.5 E9/L Final   06/23/2022 7.4 4.5 - 11.5 E9/L Final     Hemoglobin   Date Value Ref Range Status   07/21/2022 8.9 (L) 12.5 - 16.5 g/dL Final   07/20/2022 10.3 (L) 12.5 - 16.5 g/dL Final   06/24/2022 7.8 (L) 12.5 - 16.5 g/dL Final     Hematocrit   Date Value Ref Range Status   07/21/2022 29.3 (L) 37.0 - 54.0 % Final   07/20/2022 33.4 (L) 37.0 - 54.0 % Final   06/24/2022 25.6 (L) 37.0 - 54.0 % Final     MCV   Date Value Ref Range Status   07/21/2022 94.2 80.0 - 99.9 fL Final   07/20/2022 92.0 80.0 - 99.9 fL Final   06/23/2022 94.9 80.0 - 99.9 fL Final     Platelets   Date Value Ref Range Status   07/21/2022 137 130 - 450 E9/L Final   07/20/2022 179 130 - 450 E9/L Final   06/23/2022 172 130 - 450 E9/L Final     Sodium   Date Value Ref Range Status   07/21/2022 139 132 - 146 mmol/L Final   07/20/2022 139 132 - 146 mmol/L Final   06/23/2022 136 132 - 146 mmol/L Final     Potassium   Date Value Ref Range Status   07/21/2022 4.6 3.5 - 5.0 mmol/L Final   06/22/2022 4.6 3.5 - 5.0 mmol/L Final     Potassium reflex Magnesium   Date Value Ref Range Status   07/20/2022 4.5 3.5 - 5.0 mmol/L Final   06/23/2022 4.3 3.5 - 5.0 mmol/L Final   06/21/2022 5.3 (H) 3.5 - 5.0 mmol/L Final     Chloride   Date Value Ref Range Status   07/21/2022 106 98 - 107 mmol/L Final   07/20/2022 104 98 - 107 mmol/L Final   06/23/2022 102 98 - 107 mmol/L Final     CO2   Date Value Ref Range Status   07/21/2022 22 22 - 29 mmol/L Final   07/20/2022 22 22 - 29 mmol/L Final   06/23/2022 28 22 - 29 mmol/L Final     BUN   Date Value Ref Range Status   07/21/2022 41 (H) 6 - 23 mg/dL Final   07/20/2022 38 (H) 6 - 23 mg/dL Final   06/23/2022 37 (H) 6 - 23 mg/dL Final     Creatinine   Date Value Ref Range Status   07/21/2022 1.1 0.7 - 1.2 mg/dL Final   07/20/2022 1.1 0.7 - 1.2 mg/dL Final   06/23/2022 1.3 (H) 0.7 - 1.2 mg/dL Final     Glucose   Date Value Ref Range Status   07/21/2022 152 (H) 74 - 99 mg/dL Final   07/20/2022 160 (H) 74 - 99 mg/dL Final   06/23/2022 110 (H) 74 - 99 mg/dL Final     Calcium   Date Value Ref Range Status   07/21/2022 8.4 (L) 8.6 - 10.2 mg/dL Final   07/20/2022 8.6 8.6 - 10.2 mg/dL Final   06/23/2022 8.3 (L) 8.6 - 10.2 mg/dL Final     Total Protein   Date Value Ref Range Status   07/21/2022 6.1 (L) 6.4 - 8.3 g/dL Final   07/20/2022 6.5 6.4 - 8.3 g/dL Final   06/23/2022 5.8 (L) 6.4 - 8.3 g/dL Final     Albumin   Date Value Ref Range Status   07/21/2022 2.8 (L) 3.5 - 5.2 g/dL Final   07/20/2022 2.9 (L) 3.5 - 5.2 g/dL Final   06/23/2022 2.4 (L) 3.5 - 5.2 g/dL Final     Total Bilirubin   Date Value Ref Range Status   07/21/2022 0.8 0.0 - 1.2 mg/dL Final   07/20/2022 0.7 0.0 - 1.2 mg/dL Final   06/23/2022 0.3 0.0 - 1.2 mg/dL Final     Alkaline Phosphatase   Date Value Ref Range Status   07/21/2022 47 40 - 129 U/L Final   07/20/2022 69 40 - 129 U/L Final   06/23/2022 63 40 - 129 U/L Final     AST   Date Value Ref Range Status   07/21/2022 21 0 - 39 U/L Final   07/20/2022 17 0 - 39 U/L Final   06/23/2022 15 0 - 39 U/L Final     ALT   Date Value Ref Range Status   07/21/2022 <5 0 - 40 U/L Final   07/20/2022 <5 0 - 40 U/L Final   06/23/2022 <5 0 - 40 U/L Final     GFR Non-   Date Value Ref Range Status   07/21/2022 >60 >=60 mL/min/1.73 Final     Comment:     Chronic Kidney Disease: less than 60 ml/min/1.73 sq.m. Kidney Failure: less than 15 ml/min/1.73 sq.m. Results valid for patients 18 years and older. 07/20/2022 >60 >=60 mL/min/1.73 Final     Comment:     Chronic Kidney Disease: less than 60 ml/min/1.73 sq.m. Kidney Failure: less than 15 ml/min/1.73 sq.m. Results valid for patients 18 years and older. 06/23/2022 54 >=60 mL/min/1.73 Final     Comment:     Chronic Kidney Disease: less than 60 ml/min/1.73 sq.m. Kidney Failure: less than 15 ml/min/1.73 sq.m. Results valid for patients 18 years and older.        GFR    Date Value Ref Range Status   07/21/2022 >60  Final   07/20/2022 >60  Final   06/23/2022 >60  Final     Magnesium   Date Value Ref Range Status   07/21/2022 2.1 1.6 - 2.6 mg/dL Final   11/02/2021 2.0 1.6 - 2.6 mg/dL Final   06/08/2016 2.2 1.6 - 2.6 mg/dL Final     Phosphorus   Date Value Ref Range Status   07/21/2022 3.7 2.5 - 4.5 mg/dL Final   06/08/2016 2.7 2.5 - 4.5 mg/dL Final   09/08/2015 2.6 2.5 - 4.5 mg/dL Final     Recent Labs     07/20/22  1857   HCO3 22.6   BE -1.8   O2SAT 96.2       RADIOLOGY:  CTA PULMONARY W CONTRAST   Final Result   No evidence of pulmonary embolism. Scattered ground-glass and consolidative opacities, greater on the right,   concerning for multifocal pneumonia. Clinical correlation recommended. XR CHEST PORTABLE   Final Result   Nodular density in the right parahilar region of 2.5 cm. Further evaluation   with CT chest recommended. PROBLEM LIST:  Principal Problem:    Respiratory failure ([facility] [address]  Active Problems:    Severe protein-calorie malnutrition ([facility] [address]  Resolved Problems:    * No resolved hospital problems. *      IMPRESSION:  Septic shock  Bilateral pneumonia in the right upper lobe, right lower lower lobe, left lower lobe  Hypovolemic shock, improved  History of left hemispheric stroke with right-sided weakness  History of PEG tube placement  History of hypertension  Pyelonephritis probably secondary to chronic indwelling Devine catheter in the nursing home  History of hypothyroidism      PLAN:  IV fluid  Continue vancomycin and cefepime  Continue IV steroids for septic shock protocol  Will need echocardiogram tomorrow  Resume tube feedings tomorrow  Following chest x-ray in a.m. more  Follow through with serologic testing for diagnostic purposes as well as cultures and Gram stains, etc.      ATTESTATION:  ICU Staff Physician note of personal involvement in Care  As the attending physician, I certify that I personally reviewed the patients history and personally examined the patient to confirm the physical findings described above,  And that I reviewed the relevant imaging studies and available reports. I also discussed the differential diagnosis and all of the proposed management plans with the patient and individuals accompanying the patient to this visit. They had the opportunity to ask questions about the proposed management plans and to have those questions answered. This patient has a high probability of sudden, clinically significant deterioration, which requires the highest level of physician preparedness to intervene urgently. I managed/supervised life or organ supporting interventions that required frequent physician assessment. I devoted my full attention to the direct care of this patient for the amount of time indicated below. Time I spent with the family or surrogate(s) is included only if the patient was incapable of providing the necessary information or participating in medical decisions - Time devoted to teaching and to any procedures I billed separately is not included.     CRITICAL CARE TIME:  36 minutes    Electronically signed by Sarbjit Hernández MD on 7/21/2022 at 1:40 PM

## 2022-07-21 NOTE — CONSULTS
Pulmonary/Critical Care Consult Note    CHIEF COMPLAINT: Acute hypoxic respiratory failure secondary to nursing home acquired pneumonia, pyelonephritis, severe sepsis, hypothyroidism, CVA, and hypertension    HISTORY OF PRESENT ILLNESS: Patient is a 41-year-old male with history of asthma, hypothyroidism, hypertension, and CVA presented to the ED on 2022 with complaints of shortness of breath and cough x1 day. Patient denies fever, chills, nausea, vomiting, chest pain, palpitations, abdominal pain, urinary symptoms, constipation, or diarrhea. Patient placed on BiPAP in the ED for respiratory distress and given 2 L normal saline bolus. He also received albumin 25 g IV x1 and we will add hydrocortisone as well. He does not require vasopressors at this time for blood pressure support at this time. Patient's UA showed evidence of UTI/pyelonephritis and he has a chronic indwelling Devine that we will change out on admission. Patient also has a PEG tube due to dysphagia as a result of prior CVA. CTA obtained in ED prior to admission showed no evidence of pulmonary embolism. Scattered groundglass and consolidative opacities greater on the right concerning for multifocal pneumonia.      ALLERGY:  Elemental sulfur and Penicillins    FAMILY HISTORY:  Family History   Problem Relation Age of Onset    Heart Failure Mother     Heart Disease Mother         CABG    Heart Failure Father        SOCIAL HISTORY:  Social History     Socioeconomic History    Marital status: Single     Spouse name: Not on file    Number of children: Not on file    Years of education: Not on file    Highest education level: Not on file   Occupational History    Not on file   Tobacco Use    Smoking status: Former     Types: Cigarettes     Quit date: 7/15/2010     Years since quittin.0    Smokeless tobacco: Former   Substance and Sexual Activity    Alcohol use: No     Alcohol/week: 0.0 standard drinks    Drug use: No    Sexual activity: Not Currently   Other Topics Concern    Not on file   Social History Narrative    Not on file     Social Determinants of Health     Financial Resource Strain: Not on file   Food Insecurity: Not on file   Transportation Needs: Not on file   Physical Activity: Not on file   Stress: Not on file   Social Connections: Not on file   Intimate Partner Violence: Not on file   Housing Stability: Not on file       MEDICAL HISTORY:  Past Medical History:   Diagnosis Date    Acquired hypothyroidism 03/09/2016    Acute renal failure ([facility] [address]     Arthritis     Asthma     History of cardiovascular stress test 03/23/2016    lexiscan    Hypertension     Stroke (cerebrum) ([facility])        MEDICATIONS:   vancomycin  25 mg/kg IntraVENous Once    sodium chloride  1,000 mL IntraVENous Once           REVIEW OF SYSTEMS:  Constitutional: Denies fever, weight loss, night sweats, and fatigue  Skin: Denies pigmentation, dark lesions, and rashes   HEENT: Denies hearing loss, tinnitus, ear drainage, epistaxis, sore throat, and hoarseness. Cardiovascular: Denies palpitations, chest pain, and chest pressure. Respiratory: Reports mostly dry cough, dyspnea at rest with exertion, denies hemoptysis, apnea, and choking.   Gastrointestinal: Denies nausea, vomiting, poor appetite, diarrhea, heartburn or reflux  Genitourinary: Denies dysuria, frequency, urgency or hematuria  Musculoskeletal: Denies myalgias, muscle weakness, and bone pain  Neurological: Denies dizziness, vertigo, headache, and focal weakness  Psychological: Denies anxiety and depression  Endocrine: Denies heat intolerance and cold intolerance  Hematopoietic/Lymphatic: Denies bleeding problems and blood transfusions    PHYSICAL EXAM:  Vitals:    07/20/22 2110   BP: (!) 101/58   Pulse: (!) 146   Resp: 26   Temp: (!) 100.9 °F (38.3 °C)   SpO2: 99%        O2 Flow Rate (L/min): 15 L/min  O2 Device: PAP (positive airway pressure)    Constitutional: Fever noted, no chills or diaphoresis  HEENT: No head lesions, PERRL, EOMI, mouth without lesions, no nasal lesions, no cervical adenopathy palpated   Respiratory: Increased respiratory effort on BiPAP, lungs with equal breath sounds diminished bilaterally with fine crackles at bilateral posterior base, no accessory muscle use   CV: Regular rate, no murmurs, JVD, no leg edema   Abdomen: Soft, PEG tube noted no complications, non tender, + bowel sounds, no lesions   Skin: Adequate turgor, no rash, capillary refill <2 seconds   Extremities: Muscular strength 3/4 to right arm and 4/4 to other 3 limbs, moves 4 limbs spontaneously, distal pulses present   Neurology: Awake and alert, PERRLA, gag intact, corneal reflexes present, speech slurred, follows commands, moves 4 limbs on command and spontaneously, equal sensation, no dysmetria, neck is supple, no meningitic signs present    LABS:  WBC   Date Value Ref Range Status   07/20/2022 11.7 (H) 4.5 - 11.5 E9/L Final   06/23/2022 7.4 4.5 - 11.5 E9/L Final   06/21/2022 9.6 4.5 - 11.5 E9/L Final     Hemoglobin   Date Value Ref Range Status   07/20/2022 10.3 (L) 12.5 - 16.5 g/dL Final   06/24/2022 7.8 (L) 12.5 - 16.5 g/dL Final   06/24/2022 7.2 (L) 12.5 - 16.5 g/dL Final     Hematocrit   Date Value Ref Range Status   07/20/2022 33.4 (L) 37.0 - 54.0 % Final   06/24/2022 25.6 (L) 37.0 - 54.0 % Final   06/24/2022 23.8 (L) 37.0 - 54.0 % Final     MCV   Date Value Ref Range Status   07/20/2022 92.0 80.0 - 99.9 fL Final   06/23/2022 94.9 80.0 - 99.9 fL Final   06/21/2022 93.0 80.0 - 99.9 fL Final     Platelets   Date Value Ref Range Status   07/20/2022 179 130 - 450 E9/L Final   06/23/2022 172 130 - 450 E9/L Final   06/21/2022 176 130 - 450 E9/L Final     Sodium   Date Value Ref Range Status   07/20/2022 139 132 - 146 mmol/L Final   06/23/2022 136 132 - 146 mmol/L Final   06/21/2022 135 132 - 146 mmol/L Final     Potassium   Date Value Ref Range Status   06/22/2022 4.6 3.5 - 5.0 mmol/L Final     Potassium reflex Magnesium   Date Value Ref Range Status   07/20/2022 4.5 3.5 - 5.0 mmol/L Final   06/23/2022 4.3 3.5 - 5.0 mmol/L Final   06/21/2022 5.3 (H) 3.5 - 5.0 mmol/L Final     Chloride   Date Value Ref Range Status   07/20/2022 104 98 - 107 mmol/L Final   06/23/2022 102 98 - 107 mmol/L Final   06/21/2022 101 98 - 107 mmol/L Final     CO2   Date Value Ref Range Status   07/20/2022 22 22 - 29 mmol/L Final   06/23/2022 28 22 - 29 mmol/L Final   06/21/2022 28 22 - 29 mmol/L Final     BUN   Date Value Ref Range Status   07/20/2022 38 (H) 6 - 23 mg/dL Final   06/23/2022 37 (H) 6 - 23 mg/dL Final   06/21/2022 32 (H) 6 - 23 mg/dL Final     Creatinine   Date Value Ref Range Status   07/20/2022 1.1 0.7 - 1.2 mg/dL Final   06/23/2022 1.3 (H) 0.7 - 1.2 mg/dL Final   06/21/2022 1.2 0.7 - 1.2 mg/dL Final     Glucose   Date Value Ref Range Status   07/20/2022 160 (H) 74 - 99 mg/dL Final   06/23/2022 110 (H) 74 - 99 mg/dL Final   06/21/2022 97 74 - 99 mg/dL Final     Calcium   Date Value Ref Range Status   07/20/2022 8.6 8.6 - 10.2 mg/dL Final   06/23/2022 8.3 (L) 8.6 - 10.2 mg/dL Final   06/21/2022 8.7 8.6 - 10.2 mg/dL Final     Total Protein   Date Value Ref Range Status   07/20/2022 6.5 6.4 - 8.3 g/dL Final   06/23/2022 5.8 (L) 6.4 - 8.3 g/dL Final   10/29/2021 6.1 (L) 6.4 - 8.3 g/dL Final     Albumin   Date Value Ref Range Status   07/20/2022 2.9 (L) 3.5 - 5.2 g/dL Final   06/23/2022 2.4 (L) 3.5 - 5.2 g/dL Final   10/29/2021 3.4 (L) 3.5 - 5.2 g/dL Final     Total Bilirubin   Date Value Ref Range Status   07/20/2022 0.7 0.0 - 1.2 mg/dL Final   06/23/2022 0.3 0.0 - 1.2 mg/dL Final   10/29/2021 0.8 0.0 - 1.2 mg/dL Final     Alkaline Phosphatase   Date Value Ref Range Status   07/20/2022 69 40 - 129 U/L Final   06/23/2022 63 40 - 129 U/L Final   10/29/2021 135 (H) 40 - 129 U/L Final     AST   Date Value Ref Range Status   07/20/2022 17 0 - 39 U/L Final   06/23/2022 15 0 - 39 U/L Final   10/29/2021 38 0 - 39 U/L Final     ALT   Date Value Ref Range Status 07/20/2022 <5 0 - 40 U/L Final   06/23/2022 <5 0 - 40 U/L Final   10/29/2021 21 0 - 40 U/L Final     GFR Non-   Date Value Ref Range Status   07/20/2022 >60 >=60 mL/min/1.73 Final     Comment:     Chronic Kidney Disease: less than 60 ml/min/1.73 sq.m. Kidney Failure: less than 15 ml/min/1.73 sq.m. Results valid for patients 18 years and older. 06/23/2022 54 >=60 mL/min/1.73 Final     Comment:     Chronic Kidney Disease: less than 60 ml/min/1.73 sq.m. Kidney Failure: less than 15 ml/min/1.73 sq.m. Results valid for patients 18 years and older. 06/21/2022 60 >=60 mL/min/1.73 Final     Comment:     Chronic Kidney Disease: less than 60 ml/min/1.73 sq.m. Kidney Failure: less than 15 ml/min/1.73 sq.m. Results valid for patients 18 years and older. GFR    Date Value Ref Range Status   07/20/2022 >60  Final   06/23/2022 >60  Final   06/21/2022 >60  Final     Magnesium   Date Value Ref Range Status   11/02/2021 2.0 1.6 - 2.6 mg/dL Final   06/08/2016 2.2 1.6 - 2.6 mg/dL Final   09/08/2015 2.2 1.6 - 2.6 mg/dL Final     Phosphorus   Date Value Ref Range Status   06/08/2016 2.7 2.5 - 4.5 mg/dL Final   09/08/2015 2.6 2.5 - 4.5 mg/dL Final   07/16/2015 1.8 (L) 2.5 - 4.5 mg/dL Final     Recent Labs     07/20/22  1857   HCO3 22.6   BE -1.8   O2SAT 96.2       RADIOLOGY:  XR CHEST PORTABLE   Final Result   Nodular density in the right parahilar region of 2.5 cm. Further evaluation   with CT chest recommended.          CTA PULMONARY W CONTRAST    (Results Pending)       IMPRESSION:  Acute hypoxic respiratory failure secondary to nursing home acquired pneumonia  UTI/pyelonephritis  Severe sepsis secondary to above  Hyperglycemia  Normocytic hypochromic anemia-history of iron deficiency  Leukocytosis  History of CVA  Hypertension  Hypothyroidism  Former smoker          PLAN:  Oxygen supplementation as needed to keep SPO2 greater than 89% and   Remove BiPAP and try patient on nasal cannula  Insert central line and initiate Levophed drip for MAP less than 65  Albumin 25 g IV x1    Hydrocortisone 100 mg IV x1 then 50 mg every 6 hours  Vancomycin and cefepime  Strep and Legionella antigens, sputum culture, cultures x2, and urine culture pending  Cardiac telemetry  Continuous pulse oximetry  Continue Xarelto  Hold tube feedings for now          ATTESTATION:  ICU Staff Physician note of personal involvement in Care  As the attending physician, I certify that I personally reviewed the patients history and personally examined the patient to confirm the physical findings described above,  And that I reviewed the relevant imaging studies and available reports. I also discussed the differential diagnosis and all of the proposed management plans with the patient and individuals accompanying the patient to this visit. They had the opportunity to ask questions about the proposed management plans and to have those questions answered. This patient has a high probability of sudden, clinically significant deterioration, which requires the highest level of physician preparedness to intervene urgently. I managed/supervised life or organ supporting interventions that required frequent physician assessment. I devoted my full attention to the direct care of this patient for the amount of time indicated below. Time I spent with the family or surrogate(s) is included only if the patient was incapable of providing the necessary information or participating in medical decisions - Time devoted to teaching and to any procedures I billed separately is not included.     CRITICAL CARE TIME:  35 minutes    Electronically signed by MARIELY Augustin CNP on 7/20/2022 at 10:32 PM

## 2022-07-21 NOTE — ACP (ADVANCE CARE PLANNING)
Advance Care Planning   Healthcare Decision Maker:    Primary Decision Maker: Darryl Arauz - Brother/Sister - [phone]    Reviewed with patient- but he is unsure of his sisters last name since she has changed it a couple times he states. CM left  for return call from [facility] sister.        Electronically signed by LAURA Doan on 7/21/2022 at 3:02 PM

## 2022-07-21 NOTE — PROGRESS NOTES
Admitting Date and Time: 7/20/2022  6:39 PM  Admit Dx: Respiratory failure ([facility] [J96.90]  Septicemia ([facility] [A41.9]  Acute respiratory failure, unspecified whether with hypoxia or hypercapnia ([facility] [J96.00]  Urinary tract infection associated with indwelling urethral catheter, initial encounter ([place] [address] [T83.511A, N39.0]    Subjective:    Pt feels fine  Per RN: vs stable    ROS: denies fever, chills, cp, sob, n/v, HA unless stated above.     vancomycin  750 mg IntraVENous Q12H    [START ON 7/22/2022] amLODIPine  2.5 mg PEG Tube Daily    aspirin  81 mg PEG Tube Daily    atorvastatin  40 mg PEG Tube Daily    ferrous sulfate  220 mg Oral Daily    metoprolol tartrate  25 mg PEG Tube BID    OLANZapine  5 mg PEG Tube Nightly    rivaroxaban  20 mg PEG Tube Daily with breakfast    nystatin  5 mL Mouth/Throat 4x Daily    docusate sodium  100 mg Per G Tube BID    divalproex  250 mg Oral TID    hydrocortisone sodium succinate PF  50 mg IntraVENous Q8H    lidocaine  5 mL IntraDERmal Once    sodium chloride flush  5-40 mL IntraVENous 2 times per day    heparin flush  1 mL IntraVENous 2 times per day    [START ON 7/22/2022] pantoprazole  40 mg Oral QAM AC    ipratropium-albuterol  1 ampule Inhalation 4x daily    cefepime  2,000 mg IntraVENous Q12H     polyethylene glycol, 17 g, Daily PRN  acetaminophen, 650 mg, Q6H PRN  prochlorperazine, 10 mg, Q6H PRN  acetaminophen, 650 mg, Q6H PRN  melatonin, 5 mg, Nightly PRN  nitroGLYCERIN, 0.4 mg, Q5 Min PRN  sodium chloride flush, 5-40 mL, PRN  sodium chloride, , PRN  heparin flush, 1 mL, PRN  perflutren lipid microspheres, 1.5 mL, ONCE PRN         Objective:    BP (!) 103/53   Pulse 77   Temp 98.2 °F (36.8 °C) (Oral)   Resp 14   Ht 5' 11\" (1.803 m)   Wt 151 lb 6.4 oz (68.7 kg)   SpO2 99%   BMI 21.12 kg/m²   General Appearance: alert and oriented to person, place and time and in no acute distress  Skin: warm and dry  Head: normocephalic and atraumatic  Eyes: pupils equal, round, and reactive to light, extraocular eye movements intact, conjunctivae normal  Neck: neck supple and non tender without mass   Pulmonary/Chest: clear to auscultation bilaterally- no wheezes, rales or rhonchi, normal air movement, no respiratory distress  Cardiovascular: normal rate, normal S1 and S2 and no carotid bruits  Abdomen: soft, non-tender, non-distended, normal bowel sounds, no masses or organomegaly  Extremities: no cyanosis, no clubbing and no edema  Neurologic: no cranial nerve deficit and speech normal      Recent Labs     07/20/22 1853 07/21/22  0435    139   K 4.5 4.6    106   CO2 22 22   BUN 38* 41*   CREATININE 1.1 1.1   GLUCOSE 160* 152*   CALCIUM 8.6 8.4*       Recent Labs     07/20/22 1853 07/21/22  0435   ALKPHOS 69 47   PROT 6.5 6.1*   LABALBU 2.9* 2.8*   BILITOT 0.7 0.8   AST 17 21   ALT <5 <5       Recent Labs     07/20/22 1853 07/21/22  0435   WBC 11.7* 18.4*   RBC 3.63* 3.11*   HGB 10.3* 8.9*   HCT 33.4* 29.3*   MCV 92.0 94.2   MCH 28.4 28.6   MCHC 30.8* 30.4*   RDW 15.9* 16.1*    137   MPV 13.4* 13.3*           Radiology:   CTA PULMONARY W CONTRAST   Final Result   No evidence of pulmonary embolism. Scattered ground-glass and consolidative opacities, greater on the right,   concerning for multifocal pneumonia. Clinical correlation recommended. XR CHEST PORTABLE   Final Result   Nodular density in the right parahilar region of 2.5 cm. Further evaluation   with CT chest recommended. Assessment:  Principal Problem:    Respiratory failure ([address]  Active Problems:    Severe protein-calorie malnutrition ([address]  Resolved Problems:    * No resolved hospital problems.  *      Plan: History of present illness from History and Physical:  80 y.o. male  has a past medical history of respiratory failure, PEG, repeated urinary infection, lives at nursing home acquired hypothyroidism, Acute renal failure ([address] Arthritis, Asthma, History of cardiovascular stress test, and Hypertension. presented with respiratory failure. Chest x-ray may suggest potential lesion. Patient unable to tolerate a CAT scan. He has a chronic Shipman  appears alert and awake with no acute distress and is able to answer simple  questions. On direct questioning, patient denied any  resting ongoing chest pain, resting SOB, hemoptysis, productive cough, fever, ongoing palpitation, active abdominal pain, hematemesis, rectal bleeding, nelda, any other  and GI complaints and any new focal neuro deficits . Hemoglobin 10.3. WBC 11.7. Creatinine 1.1. BNP 2423. Lactate is 3.5    Septic shock due to b/l pna complicated by dehydration / hypovolemic shock: pulmon on. In icu. Empiric abx with vanco and cefipime. Solucortef x 5 doses  Old left stroke with resulting right weakness  H/o htn: monitor in icu  Indwelling shipman with uti / pyelo: abx above  Dementia by hx. Vascular component from #2? Recent d/c from here 6/24  Dvt prophy on systemic anticoagulation with xarelto    NOTE: This report was transcribed using voice recognition software. Every effort was made to ensure accuracy; however, inadvertent computerized transcription errors may be present.      Electronically signed by Zane Judge MD on 7/21/2022 at 2:14 PM

## 2022-07-21 NOTE — VCC REMOTE MONITORING
Spoke with primary RN Yessy regarding 6 hour Sepsis bundle.      Monik Phillips RN, BSN  [address] [phone]

## 2022-07-21 NOTE — PLAN OF CARE
Problem: Discharge Planning  Goal: Discharge to home or other facility with appropriate resources  7/21/2022 0558 by Yovany Martínez RN  Outcome: Progressing  7/21/2022 0303 by Yovany Martínez RN  Outcome: Progressing  7/21/2022 0301 by Yovany Martínez RN  Outcome: Progressing  Flowsheets (Taken 7/21/2022 0249)  Discharge to home or other facility with appropriate resources: Identify barriers to discharge with patient and caregiver     Problem: Skin/Tissue Integrity  Goal: Absence of new skin breakdown  Description: 1. Monitor for areas of redness and/or skin breakdown  2. Assess vascular access sites hourly  3. Every 4-6 hours minimum:  Change oxygen saturation probe site  4. Every 4-6 hours:  If on nasal continuous positive airway pressure, respiratory therapy assess nares and determine need for appliance change or resting period.   7/21/2022 0558 by Yovany Martínez RN  Outcome: Progressing  7/21/2022 0303 by Yovany Martínez RN  Outcome: Progressing  7/21/2022 0301 by Yovany Martínez RN  Outcome: Progressing     Problem: ABCDS Injury Assessment  Goal: Absence of physical injury  7/21/2022 0558 by Yovany Martínez RN  Outcome: Progressing  7/21/2022 0303 by Yovany Martínez RN  Outcome: Progressing  7/21/2022 0301 by Yovany Martínez RN  Outcome: Progressing     Problem: Safety - Adult  Goal: Free from fall injury  7/21/2022 0558 by Yovany Martínez RN  Outcome: Progressing  7/21/2022 0303 by Yovany Martínez RN  Outcome: Progressing  7/21/2022 0301 by Yovany Martínez RN  Outcome: Progressing     Problem: Pain  Goal: Verbalizes/displays adequate comfort level or baseline comfort level  7/21/2022 0558 by Yovany Martínez RN  Outcome: Progressing  Flowsheets (Taken 7/21/2022 0400)  Verbalizes/displays adequate comfort level or baseline comfort level:   Encourage patient to monitor pain and request assistance   Assess pain using appropriate pain scale  7/21/2022 0303 by Yovany Martínez RN  Outcome: Progressing  Flowsheets (Taken 7/21/2022 0125)  Verbalizes/displays adequate comfort level or baseline comfort level:   Encourage patient to monitor pain and request assistance   Assess pain using appropriate pain scale

## 2022-07-21 NOTE — ED NOTES
Attending notified of pt's work of breathing and inability to tolerate lying flat for CTA. No new orders.      Sonam Llanos RN  07/20/22 2001

## 2022-07-22 LAB
ALBUMIN SERPL-MCNC: 2.3 G/DL (ref 3.5–5.2)
ALP BLD-CCNC: 52 U/L (ref 40–129)
ALT SERPL-CCNC: 5 U/L (ref 0–40)
ANION GAP SERPL CALCULATED.3IONS-SCNC: 6 MMOL/L (ref 7–16)
AST SERPL-CCNC: 18 U/L (ref 0–39)
BILIRUB SERPL-MCNC: 0.3 MG/DL (ref 0–1.2)
BUN BLDV-MCNC: 39 MG/DL (ref 6–23)
CALCIUM SERPL-MCNC: 8.2 MG/DL (ref 8.6–10.2)
CHLORIDE BLD-SCNC: 110 MMOL/L (ref 98–107)
CO2: 24 MMOL/L (ref 22–29)
CREAT SERPL-MCNC: 0.9 MG/DL (ref 0.7–1.2)
GFR AFRICAN AMERICAN: >60
GFR NON-AFRICAN AMERICAN: >60 ML/MIN/1.73
GLUCOSE BLD-MCNC: 124 MG/DL (ref 74–99)
HCT VFR BLD CALC: 26.5 % (ref 37–54)
HEMOGLOBIN: 8 G/DL (ref 12.5–16.5)
MAGNESIUM: 2.4 MG/DL (ref 1.6–2.6)
MCH RBC QN AUTO: 28.7 PG (ref 26–35)
MCHC RBC AUTO-ENTMCNC: 30.2 % (ref 32–34.5)
MCV RBC AUTO: 95 FL (ref 80–99.9)
ORGANISM: ABNORMAL
PDW BLD-RTO: 15.9 FL (ref 11.5–15)
PHOSPHORUS: 2.8 MG/DL (ref 2.5–4.5)
PLATELET # BLD: 143 E9/L (ref 130–450)
PMV BLD AUTO: 12.8 FL (ref 7–12)
POTASSIUM SERPL-SCNC: 4.4 MMOL/L (ref 3.5–5)
RBC # BLD: 2.79 E12/L (ref 3.8–5.8)
SODIUM BLD-SCNC: 140 MMOL/L (ref 132–146)
TOTAL PROTEIN: 5.6 G/DL (ref 6.4–8.3)
VANCOMYCIN TROUGH: 20.8 MCG/ML (ref 5–16)
VITAMIN D 25-HYDROXY: 31 NG/ML (ref 30–100)
WBC # BLD: 10.9 E9/L (ref 4.5–11.5)

## 2022-07-22 PROCEDURE — 83735 ASSAY OF MAGNESIUM: CPT

## 2022-07-22 PROCEDURE — 2580000003 HC RX 258: Performed by: INTERNAL MEDICINE

## 2022-07-22 PROCEDURE — 84100 ASSAY OF PHOSPHORUS: CPT

## 2022-07-22 PROCEDURE — 99232 SBSQ HOSP IP/OBS MODERATE 35: CPT | Performed by: INTERNAL MEDICINE

## 2022-07-22 PROCEDURE — 2700000000 HC OXYGEN THERAPY PER DAY

## 2022-07-22 PROCEDURE — 96366 THER/PROPH/DIAG IV INF ADDON: CPT

## 2022-07-22 PROCEDURE — 2580000003 HC RX 258: Performed by: NURSE PRACTITIONER

## 2022-07-22 PROCEDURE — 94660 CPAP INITIATION&MGMT: CPT

## 2022-07-22 PROCEDURE — 80202 ASSAY OF VANCOMYCIN: CPT

## 2022-07-22 PROCEDURE — 36592 COLLECT BLOOD FROM PICC: CPT

## 2022-07-22 PROCEDURE — 6360000002 HC RX W HCPCS: Performed by: NURSE PRACTITIONER

## 2022-07-22 PROCEDURE — 36415 COLL VENOUS BLD VENIPUNCTURE: CPT

## 2022-07-22 PROCEDURE — 80053 COMPREHEN METABOLIC PANEL: CPT

## 2022-07-22 PROCEDURE — 94640 AIRWAY INHALATION TREATMENT: CPT

## 2022-07-22 PROCEDURE — 85027 COMPLETE CBC AUTOMATED: CPT

## 2022-07-22 PROCEDURE — 96376 TX/PRO/DX INJ SAME DRUG ADON: CPT

## 2022-07-22 PROCEDURE — 6360000002 HC RX W HCPCS: Performed by: INTERNAL MEDICINE

## 2022-07-22 PROCEDURE — 82306 VITAMIN D 25 HYDROXY: CPT

## 2022-07-22 PROCEDURE — 6370000000 HC RX 637 (ALT 250 FOR IP): Performed by: INTERNAL MEDICINE

## 2022-07-22 PROCEDURE — 96361 HYDRATE IV INFUSION ADD-ON: CPT

## 2022-07-22 PROCEDURE — 2000000000 HC ICU R&B

## 2022-07-22 RX ADMIN — NYSTATIN 500000 UNITS: 100000 SUSPENSION ORAL at 21:40

## 2022-07-22 RX ADMIN — VANCOMYCIN HYDROCHLORIDE 750 MG: 10 INJECTION, POWDER, LYOPHILIZED, FOR SOLUTION INTRAVENOUS at 00:50

## 2022-07-22 RX ADMIN — DIVALPROEX SODIUM 250 MG: 125 CAPSULE, COATED PELLETS ORAL at 14:31

## 2022-07-22 RX ADMIN — IPRATROPIUM BROMIDE AND ALBUTEROL SULFATE 1 AMPULE: .5; 2.5 SOLUTION RESPIRATORY (INHALATION) at 14:47

## 2022-07-22 RX ADMIN — DIVALPROEX SODIUM 250 MG: 125 CAPSULE, COATED PELLETS ORAL at 09:04

## 2022-07-22 RX ADMIN — PANTOPRAZOLE SODIUM 40 MG: 40 TABLET, DELAYED RELEASE ORAL at 06:39

## 2022-07-22 RX ADMIN — NYSTATIN 500000 UNITS: 100000 SUSPENSION ORAL at 15:57

## 2022-07-22 RX ADMIN — Medication 10 ML: at 09:04

## 2022-07-22 RX ADMIN — METOPROLOL TARTRATE 25 MG: 25 TABLET, FILM COATED ORAL at 21:16

## 2022-07-22 RX ADMIN — MUPIROCIN: 20 OINTMENT TOPICAL at 22:00

## 2022-07-22 RX ADMIN — IPRATROPIUM BROMIDE AND ALBUTEROL SULFATE 1 AMPULE: .5; 2.5 SOLUTION RESPIRATORY (INHALATION) at 05:39

## 2022-07-22 RX ADMIN — DIVALPROEX SODIUM 250 MG: 125 CAPSULE, COATED PELLETS ORAL at 21:17

## 2022-07-22 RX ADMIN — HYDROCORTISONE SODIUM SUCCINATE 50 MG: 100 INJECTION, POWDER, FOR SOLUTION INTRAMUSCULAR; INTRAVENOUS at 06:39

## 2022-07-22 RX ADMIN — NYSTATIN 500000 UNITS: 100000 SUSPENSION ORAL at 14:31

## 2022-07-22 RX ADMIN — CEFEPIME 2000 MG: 2 INJECTION, POWDER, FOR SOLUTION INTRAVENOUS at 09:01

## 2022-07-22 RX ADMIN — SODIUM CHLORIDE: 9 INJECTION, SOLUTION INTRAVENOUS at 03:11

## 2022-07-22 RX ADMIN — METOPROLOL TARTRATE 25 MG: 25 TABLET, FILM COATED ORAL at 09:03

## 2022-07-22 RX ADMIN — RIVAROXABAN 20 MG: 20 TABLET, FILM COATED ORAL at 07:49

## 2022-07-22 RX ADMIN — DOCUSATE SODIUM LIQUID 100 MG: 100 LIQUID ORAL at 21:15

## 2022-07-22 RX ADMIN — IPRATROPIUM BROMIDE AND ALBUTEROL SULFATE 1 AMPULE: .5; 2.5 SOLUTION RESPIRATORY (INHALATION) at 09:55

## 2022-07-22 RX ADMIN — ASPIRIN 81 MG CHEWABLE TABLET 81 MG: 81 TABLET CHEWABLE at 09:03

## 2022-07-22 RX ADMIN — CEFEPIME 2000 MG: 2 INJECTION, POWDER, FOR SOLUTION INTRAVENOUS at 21:21

## 2022-07-22 RX ADMIN — HYDROCORTISONE SODIUM SUCCINATE 50 MG: 100 INJECTION, POWDER, FOR SOLUTION INTRAMUSCULAR; INTRAVENOUS at 21:17

## 2022-07-22 RX ADMIN — NYSTATIN 500000 UNITS: 100000 SUSPENSION ORAL at 09:03

## 2022-07-22 RX ADMIN — AMLODIPINE BESYLATE 2.5 MG: 5 TABLET ORAL at 09:03

## 2022-07-22 RX ADMIN — Medication 10 ML: at 21:19

## 2022-07-22 RX ADMIN — MINERAL SUPPLEMENT IRON 300 MG / 5 ML STRENGTH LIQUID 100 PER BOX UNFLAVORED 220 MG: at 09:02

## 2022-07-22 RX ADMIN — HEPARIN 100 UNITS: 100 SYRINGE at 21:18

## 2022-07-22 RX ADMIN — OLANZAPINE 5 MG: 5 TABLET, FILM COATED ORAL at 21:18

## 2022-07-22 RX ADMIN — DOCUSATE SODIUM LIQUID 100 MG: 100 LIQUID ORAL at 09:02

## 2022-07-22 RX ADMIN — HYDROCORTISONE SODIUM SUCCINATE 50 MG: 100 INJECTION, POWDER, FOR SOLUTION INTRAMUSCULAR; INTRAVENOUS at 14:30

## 2022-07-22 RX ADMIN — HEPARIN 100 UNITS: 100 SYRINGE at 09:02

## 2022-07-22 RX ADMIN — IPRATROPIUM BROMIDE AND ALBUTEROL SULFATE 1 AMPULE: .5; 2.5 SOLUTION RESPIRATORY (INHALATION) at 18:11

## 2022-07-22 RX ADMIN — ATORVASTATIN CALCIUM 40 MG: 40 TABLET, FILM COATED ORAL at 09:03

## 2022-07-22 ASSESSMENT — PAIN SCALES - GENERAL
PAINLEVEL_OUTOF10: 0
PAINLEVEL_OUTOF10: 0

## 2022-07-22 NOTE — PROGRESS NOTES
Pharmacy Consultation Note  (Antibiotic Dosing and Monitoring)    Initial consult date: 7/21/22  Consulting physician/provider: Dr Sarita Ballesteros  Drug: Vancomycin  Indication: Bacteremia/UTI/Pneumonia    Age/  Gender Height Weight IBW  Allergy Information   71 y.o./male 5' 11\" (180.3 cm) 150 lb (68 kg)     Ideal body weight: 75.3 kg (166 lb 0.1 oz)   Elemental sulfur and Penicillins      Renal Function:  Recent Labs     07/20/22  1853 07/21/22  0435 07/22/22  0446   BUN 38* 41* 39*   CREATININE 1.1 1.1 0.9       Intake/Output Summary (Last 24 hours) at 7/22/2022 1304  Last data filed at 7/22/2022 0400  Gross per 24 hour   Intake 3539.94 ml   Output 1050 ml   Net 2489.94 ml       Vancomycin Monitoring:  Trough:    Recent Labs     07/22/22  1141   VANCOTROUGH 20.8*     Random:  No results for input(s): VANCORANDOM in the last 72 hours. Vancomycin Administration Times:  Recent vancomycin administrations                     vancomycin (VANCOCIN) 750 mg in dextrose 5 % 250 mL IVPB (mg) 750 mg New Bag 07/22/22 0050     750 mg New Bag 07/21/22 1207    vancomycin (VANCOCIN) 1,750 mg in dextrose 5 % 500 mL IVPB (mg) 1,750 mg New Bag 07/21/22 0035                    Assessment:  Patient is a 80 y.o. male who has been initiated on vancomycin  Estimated Creatinine Clearance: 74 mL/min (based on SCr of 0.9 mg/dL).   To dose vancomycin, pharmacy will be utilizing [employer] calculation software for goal AUC/JANKI 400-600 mg/L-hr  7/22: Trough @ 1141 = 20.8 mcg/mL;     Plan:  Vancomycin 1250 mg IV every 24 hours  Levels when appropriate  Will continue to monitor renal function   Clinical pharmacy to follow      Julisa Rosales PharmD 7/22/2022 1:10 PM   [phone]

## 2022-07-22 NOTE — CARE COORDINATION
7/22/2022 CM transition of care: ICU, 2lnc. Pt is from [facility] (n)- NO PRECERT to return-long term bedhold, STEVAN signed at discharge, Rapid covid needed prior to return. Legal nok is pts sister she is planning to visit Tuesday next week. She calls her brother but doesn't visit him. Ambulance sheet in soft blue chart. CM/SS following.  Electronically signed by Johnson Apley, RN-BC on 7/22/2022 at 2:00 PM

## 2022-07-22 NOTE — PROGRESS NOTES
Pulmonary/Critical Care Progress Note    We are following patient for septic shock, pneumonia (right upper lobe, right lower lobe, left lower lobe), urinary tract infection, history of left hemispheric stroke, status post PEG tube placement, pyelonephritis    SUBJECTIVE:  Patient remains awake alert and comfortable. Cultures are still pending but Gram stains suggest the presence of gram-negative rods (oxidase positive rods, usually Pseudomonas) as well as gram-positive cocci. Patient is currently receiving IV vancomycin and IV cefepime. His hydrocortisone dose should be finishing soon and he is not currently requiring fluids or pressors. He is back to 70 cc/h of tube feedings and seems to be tolerating this well. Therefore, we have discontinued IV fluids altogether. The patient's white blood cell count has come down along with a downtrend in the BUN and creatinine.     MEDICATIONS:   [START ON 7/23/2022] vancomycin  1,250 mg IntraVENous Q24H    amLODIPine  2.5 mg PEG Tube Daily    aspirin  81 mg PEG Tube Daily    atorvastatin  40 mg PEG Tube Daily    ferrous sulfate  220 mg Oral Daily    metoprolol tartrate  25 mg PEG Tube BID    OLANZapine  5 mg PEG Tube Nightly    rivaroxaban  20 mg PEG Tube Daily with breakfast    nystatin  5 mL Mouth/Throat 4x Daily    docusate sodium  100 mg Per G Tube BID    divalproex  250 mg Oral TID    hydrocortisone sodium succinate PF  50 mg IntraVENous Q8H    sodium chloride flush  5-40 mL IntraVENous 2 times per day    heparin flush  1 mL IntraVENous 2 times per day    pantoprazole  40 mg Oral QAM AC    ipratropium-albuterol  1 ampule Inhalation 4x daily    cefepime  2,000 mg IntraVENous Q12H      sodium chloride       polyethylene glycol, [DISCONTINUED] acetaminophen **OR** acetaminophen, prochlorperazine, acetaminophen, melatonin, nitroGLYCERIN, sodium chloride flush, sodium chloride, heparin flush, perflutren lipid microspheres      REVIEW OF SYSTEMS:  Constitutional: Denies fever, weight loss, night sweats, and fatigue  Skin: Denies pigmentation, dark lesions, and rashes   HEENT: Denies hearing loss, tinnitus, ear drainage, epistaxis, sore throat, and hoarseness. Cardiovascular: Denies palpitations, chest pain, and chest pressure. Respiratory: Denies cough, dyspnea at rest, hemoptysis, apnea, and choking. Gastrointestinal: Denies nausea, vomiting, poor appetite, diarrhea, heartburn or reflux  Genitourinary: Denies dysuria, frequency, urgency or hematuria  Musculoskeletal: Denies myalgias, muscle weakness, and bone pain  Neurological: Denies dizziness, vertigo, headache, and focal weakness  Psychological: Denies anxiety and depression  Endocrine: Denies heat intolerance and cold intolerance  Hematopoietic/Lymphatic: Denies bleeding problems and blood transfusions    OBJECTIVE:  Vitals:    07/22/22 1100   BP: (!) 107/47   Pulse: 60   Resp: 15   Temp:    SpO2: 98%        O2 Flow Rate (L/min): 2 L/min  O2 Device: None (Room air)    PHYSICAL EXAM:  Constitutional: No fever, chills, diaphoresis  Skin: No skin rash, no skin breakdown  HEENT: Unremarkable  Neck: No JVD, lymphadenopathy, thyromegaly  Cardiovascular: S1, S2 regular. No S3 murmurs rubs present  Respiratory: Crackles right lower lung field and left lower lung field. Few soft anterior wheezes are heard  Gastrointestinal: Soft, flat, nontender. PEG tube in place  Genitourinary: No grossly bloody urine  Extremities: No clubbing, cyanosis, or edema  Neurological: Right hemiparesis, expressive aphasia, otherwise unremarkable  Psychological: In relatively good spirits.   Appropriate affect    LABS:  WBC   Date Value Ref Range Status   07/22/2022 10.9 4.5 - 11.5 E9/L Final   07/21/2022 18.4 (H) 4.5 - 11.5 E9/L Final   07/20/2022 11.7 (H) 4.5 - 11.5 E9/L Final     Hemoglobin   Date Value Ref Range Status   07/22/2022 8.0 (L) 12.5 - 16.5 g/dL Final   07/21/2022 8.9 (L) 12.5 - 16.5 g/dL Final   07/20/2022 10.3 (L) 12.5 - 16.5 g/dL Final     Hematocrit   Date Value Ref Range Status   07/22/2022 26.5 (L) 37.0 - 54.0 % Final   07/21/2022 29.3 (L) 37.0 - 54.0 % Final   07/20/2022 33.4 (L) 37.0 - 54.0 % Final     MCV   Date Value Ref Range Status   07/22/2022 95.0 80.0 - 99.9 fL Final   07/21/2022 94.2 80.0 - 99.9 fL Final   07/20/2022 92.0 80.0 - 99.9 fL Final     Platelets   Date Value Ref Range Status   07/22/2022 143 130 - 450 E9/L Final   07/21/2022 137 130 - 450 E9/L Final   07/20/2022 179 130 - 450 E9/L Final     Sodium   Date Value Ref Range Status   07/22/2022 140 132 - 146 mmol/L Final   07/21/2022 139 132 - 146 mmol/L Final   07/20/2022 139 132 - 146 mmol/L Final     Potassium   Date Value Ref Range Status   07/22/2022 4.4 3.5 - 5.0 mmol/L Final   07/21/2022 4.6 3.5 - 5.0 mmol/L Final   06/22/2022 4.6 3.5 - 5.0 mmol/L Final     Potassium reflex Magnesium   Date Value Ref Range Status   07/20/2022 4.5 3.5 - 5.0 mmol/L Final   06/23/2022 4.3 3.5 - 5.0 mmol/L Final   06/21/2022 5.3 (H) 3.5 - 5.0 mmol/L Final     Chloride   Date Value Ref Range Status   07/22/2022 110 (H) 98 - 107 mmol/L Final   07/21/2022 106 98 - 107 mmol/L Final   07/20/2022 104 98 - 107 mmol/L Final     CO2   Date Value Ref Range Status   07/22/2022 24 22 - 29 mmol/L Final   07/21/2022 22 22 - 29 mmol/L Final   07/20/2022 22 22 - 29 mmol/L Final     BUN   Date Value Ref Range Status   07/22/2022 39 (H) 6 - 23 mg/dL Final   07/21/2022 41 (H) 6 - 23 mg/dL Final   07/20/2022 38 (H) 6 - 23 mg/dL Final     Creatinine   Date Value Ref Range Status   07/22/2022 0.9 0.7 - 1.2 mg/dL Final   07/21/2022 1.1 0.7 - 1.2 mg/dL Final   07/20/2022 1.1 0.7 - 1.2 mg/dL Final     Glucose   Date Value Ref Range Status   07/22/2022 124 (H) 74 - 99 mg/dL Final   07/21/2022 152 (H) 74 - 99 mg/dL Final   07/20/2022 160 (H) 74 - 99 mg/dL Final     Calcium   Date Value Ref Range Status   07/22/2022 8.2 (L) 8.6 - 10.2 mg/dL Final   07/21/2022 8.4 (L) 8.6 - 10.2 mg/dL Final   07/20/2022 8.6 8.6 - 10.2 mg/dL Final     Total Protein   Date Value Ref Range Status   07/22/2022 5.6 (L) 6.4 - 8.3 g/dL Final   07/21/2022 6.1 (L) 6.4 - 8.3 g/dL Final   07/20/2022 6.5 6.4 - 8.3 g/dL Final     Albumin   Date Value Ref Range Status   07/22/2022 2.3 (L) 3.5 - 5.2 g/dL Final   07/21/2022 2.8 (L) 3.5 - 5.2 g/dL Final   07/20/2022 2.9 (L) 3.5 - 5.2 g/dL Final     Total Bilirubin   Date Value Ref Range Status   07/22/2022 0.3 0.0 - 1.2 mg/dL Final   07/21/2022 0.8 0.0 - 1.2 mg/dL Final   07/20/2022 0.7 0.0 - 1.2 mg/dL Final     Alkaline Phosphatase   Date Value Ref Range Status   07/22/2022 52 40 - 129 U/L Final   07/21/2022 47 40 - 129 U/L Final   07/20/2022 69 40 - 129 U/L Final     AST   Date Value Ref Range Status   07/22/2022 18 0 - 39 U/L Final   07/21/2022 21 0 - 39 U/L Final   07/20/2022 17 0 - 39 U/L Final     ALT   Date Value Ref Range Status   07/22/2022 5 0 - 40 U/L Final   07/21/2022 <5 0 - 40 U/L Final   07/20/2022 <5 0 - 40 U/L Final     GFR Non-   Date Value Ref Range Status   07/22/2022 >60 >=60 mL/min/1.73 Final     Comment:     Chronic Kidney Disease: less than 60 ml/min/1.73 sq.m. Kidney Failure: less than 15 ml/min/1.73 sq.m. Results valid for patients 18 years and older. 07/21/2022 >60 >=60 mL/min/1.73 Final     Comment:     Chronic Kidney Disease: less than 60 ml/min/1.73 sq.m. Kidney Failure: less than 15 ml/min/1.73 sq.m. Results valid for patients 18 years and older. 07/20/2022 >60 >=60 mL/min/1.73 Final     Comment:     Chronic Kidney Disease: less than 60 ml/min/1.73 sq.m. Kidney Failure: less than 15 ml/min/1.73 sq.m. Results valid for patients 18 years and older.        GFR    Date Value Ref Range Status   07/22/2022 >60  Final   07/21/2022 >60  Final   07/20/2022 >60  Final     Magnesium   Date Value Ref Range Status   07/22/2022 2.4 1.6 - 2.6 mg/dL Final   07/21/2022 2.1 1.6 - 2.6 mg/dL Final   11/02/2021 2.0 1.6 - 2.6 mg/dL Final     Phosphorus   Date Value Ref Range Status   07/22/2022 2.8 2.5 - 4.5 mg/dL Final   07/21/2022 3.7 2.5 - 4.5 mg/dL Final   06/08/2016 2.7 2.5 - 4.5 mg/dL Final     Recent Labs     07/20/22  1857   HCO3 22.6   BE -1.8   O2SAT 96.2       RADIOLOGY:  CTA PULMONARY W CONTRAST   Final Result   No evidence of pulmonary embolism. Scattered ground-glass and consolidative opacities, greater on the right,   concerning for multifocal pneumonia. Clinical correlation recommended. XR CHEST PORTABLE   Final Result   Nodular density in the right parahilar region of 2.5 cm. Further evaluation   with CT chest recommended. XR CHEST PORTABLE    (Results Pending)           PROBLEM LIST:  Principal Problem:    Respiratory failure ([address]  Active Problems:    Severe protein-calorie malnutrition ([address]  Resolved Problems:    * No resolved hospital problems. *      IMPRESSION:  1. Septic shock  2. Bilateral pneumonia located in right upper lobe, right lower lobe, left lower lobe  3. History of left hemispheric stroke with right-sided weakness  4. Hypovolemic shock, improved  5. History of hypertension    6. Pyelonephritis secondary to chronic indwelling Devine catheter in the nursing home  7. History of hypothyroidism            PLAN:  DC IV fluid  Resume tube feedings  Continue IV antibiotics with cultures pending  Echo shows only stage I diastolic dysfunction  Chest x-ray in a.m.  DC steroids after sixth dose of IV hydrocortisone  Patient will need PT and OT consults    ATTESTATION:  ICU Staff Physician note of personal involvement in Care  As the attending physician, I certify that I personally reviewed the patients history and personally examined the patient to confirm the physical findings described above,  And that I reviewed the relevant imaging studies and available reports.   I also discussed the differential diagnosis and all of the proposed management plans with the patient and individuals accompanying the patient to this visit. They had the opportunity to ask questions about the proposed management plans and to have those questions answered. This patient has a high probability of sudden, clinically significant deterioration, which requires the highest level of physician preparedness to intervene urgently. I managed/supervised life or organ supporting interventions that required frequent physician assessment. I devoted my full attention to the direct care of this patient for the amount of time indicated below. Time I spent with the family or surrogate(s) is included only if the patient was incapable of providing the necessary information or participating in medical decisions - Time devoted to teaching and to any procedures I billed separately is not included.     CRITICAL CARE TIME:  34 minutes    Electronically signed by Juan Bunch MD on 7/22/2022 at 2:23 PM

## 2022-07-22 NOTE — PLAN OF CARE
Problem: Discharge Planning  Goal: Discharge to home or other facility with appropriate resources  7/22/2022 1531 by Martin Sharpe RN  Outcome: Progressing  Flowsheets (Taken 7/22/2022 0800)  Discharge to home or other facility with appropriate resources: Identify barriers to discharge with patient and caregiver  7/22/2022 0236 by Jewels Wheat RN  Outcome: Progressing     Problem: Skin/Tissue Integrity  Goal: Absence of new skin breakdown  Description: 1. Monitor for areas of redness and/or skin breakdown  2. Assess vascular access sites hourly  3. Every 4-6 hours minimum:  Change oxygen saturation probe site  4. Every 4-6 hours:  If on nasal continuous positive airway pressure, respiratory therapy assess nares and determine need for appliance change or resting period.   7/22/2022 1531 by Martin Sharpe RN  Outcome: Progressing  7/22/2022 0236 by Jewels Wheat RN  Outcome: Progressing     Problem: ABCDS Injury Assessment  Goal: Absence of physical injury  7/22/2022 1531 by Martin Sharpe RN  Outcome: Progressing  Flowsheets (Taken 7/22/2022 1525)  Absence of Physical Injury: Implement safety measures based on patient assessment  7/22/2022 0236 by Jewels Wheat RN  Outcome: Progressing     Problem: Safety - Adult  Goal: Free from fall injury  7/22/2022 1531 by Martin Sharpe RN  Outcome: Progressing  Flowsheets (Taken 7/22/2022 1525)  Free From Fall Injury:   Carlo Conley family/caregiver on patient safety   Based on caregiver fall risk screen, instruct family/caregiver to ask for assistance with transferring infant if caregiver noted to have fall risk factors  7/22/2022 0236 by Jewels Wheat RN  Outcome: Progressing     Problem: Nutrition Deficit:  Goal: Optimize nutritional status  7/22/2022 1531 by Martin Sharpe RN  Outcome: Progressing  7/22/2022 0236 by Jewels Wheat RN  Outcome: Progressing     Problem: Pain  Goal: Verbalizes/displays adequate comfort level or baseline comfort level  7/22/2022 1531 by Caden Rehman RN  Outcome: Progressing  Flowsheets  Taken 7/22/2022 1200  Verbalizes/displays adequate comfort level or baseline comfort level:   Encourage patient to monitor pain and request assistance   Assess pain using appropriate pain scale  Taken 7/22/2022 1000  Verbalizes/displays adequate comfort level or baseline comfort level: Encourage patient to monitor pain and request assistance  Taken 7/22/2022 0900  Verbalizes/displays adequate comfort level or baseline comfort level: Encourage patient to monitor pain and request assistance  Taken 7/22/2022 0800  Verbalizes/displays adequate comfort level or baseline comfort level:   Encourage patient to monitor pain and request assistance   Assess pain using appropriate pain scale  7/22/2022 0236 by Radha Miller RN  Outcome: Progressing  Flowsheets  Taken 7/22/2022 0000  Verbalizes/displays adequate comfort level or baseline comfort level:   Encourage patient to monitor pain and request assistance   Assess pain using appropriate pain scale  Taken 7/21/2022 2000  Verbalizes/displays adequate comfort level or baseline comfort level:   Encourage patient to monitor pain and request assistance   Assess pain using appropriate pain scale

## 2022-07-22 NOTE — PROGRESS NOTES
Admitting Date and Time: 7/20/2022  6:39 PM  Admit Dx: Respiratory failure ([facility] [J96.90]  Septicemia ([facility] [A41.9]  Acute respiratory failure, unspecified whether with hypoxia or hypercapnia ([facility] [J96.00]  Urinary tract infection associated with indwelling urethral catheter, initial encounter ([facility] [T83.511A, N39.0]    Subjective:    Pt feels fine  Per RN: vs stable    ROS: denies fever, chills, cp, sob, n/v, HA unless stated above.      [START ON 7/23/2022] vancomycin  1,250 mg IntraVENous Q24H    mupirocin   Topical BID    amLODIPine  2.5 mg PEG Tube Daily    aspirin  81 mg PEG Tube Daily    atorvastatin  40 mg PEG Tube Daily    ferrous sulfate  220 mg Oral Daily    metoprolol tartrate  25 mg PEG Tube BID    OLANZapine  5 mg PEG Tube Nightly    rivaroxaban  20 mg PEG Tube Daily with breakfast    nystatin  5 mL Mouth/Throat 4x Daily    docusate sodium  100 mg Per G Tube BID    divalproex  250 mg Oral TID    hydrocortisone sodium succinate PF  50 mg IntraVENous Q8H    sodium chloride flush  5-40 mL IntraVENous 2 times per day    heparin flush  1 mL IntraVENous 2 times per day    pantoprazole  40 mg Oral QAM AC    ipratropium-albuterol  1 ampule Inhalation 4x daily    cefepime  2,000 mg IntraVENous Q12H     polyethylene glycol, 17 g, Daily PRN  acetaminophen, 650 mg, Q6H PRN  prochlorperazine, 10 mg, Q6H PRN  acetaminophen, 650 mg, Q6H PRN  melatonin, 5 mg, Nightly PRN  nitroGLYCERIN, 0.4 mg, Q5 Min PRN  sodium chloride flush, 5-40 mL, PRN  sodium chloride, , PRN  heparin flush, 1 mL, PRN  perflutren lipid microspheres, 1.5 mL, ONCE PRN       Objective:    /60   Pulse 81   Temp 98.9 °F (37.2 °C) (Oral)   Resp 18   Ht 5' 11\" (1.803 m)   Wt 151 lb 6.4 oz (68.7 kg)   SpO2 96%   BMI 21.12 kg/m²   General Appearance: alert and oriented to person, place and time and in no acute distress  Skin: warm and dry  Head: normocephalic and atraumatic  Eyes: pupils equal, round, and reactive to light, extraocular eye movements intact, conjunctivae normal  Neck: neck supple and non tender without mass   Pulmonary/Chest: clear to auscultation bilaterally- no wheezes, rales or rhonchi, normal air movement, no respiratory distress  Cardiovascular: normal rate, normal S1 and S2 and no carotid bruits  Abdomen: soft, non-tender, non-distended, normal bowel sounds, no masses or organomegaly  Extremities: no cyanosis, no clubbing and no edema  Neurologic: no cranial nerve deficit and speech normal      Recent Labs     07/20/22 1853 07/21/22 0435 07/22/22  0446    139 140   K 4.5 4.6 4.4    106 110*   CO2 22 22 24   BUN 38* 41* 39*   CREATININE 1.1 1.1 0.9   GLUCOSE 160* 152* 124*   CALCIUM 8.6 8.4* 8.2*         Recent Labs     07/20/22 1853 07/21/22 0435 07/22/22  0446   ALKPHOS 69 47 52   PROT 6.5 6.1* 5.6*   LABALBU 2.9* 2.8* 2.3*   BILITOT 0.7 0.8 0.3   AST 17 21 18   ALT <5 <5 5         Recent Labs     07/20/22 1853 07/21/22 0435 07/22/22  0446   WBC 11.7* 18.4* 10.9   RBC 3.63* 3.11* 2.79*   HGB 10.3* 8.9* 8.0*   HCT 33.4* 29.3* 26.5*   MCV 92.0 94.2 95.0   MCH 28.4 28.6 28.7   MCHC 30.8* 30.4* 30.2*   RDW 15.9* 16.1* 15.9*    137 143   MPV 13.4* 13.3* 12.8*             Radiology:   CTA PULMONARY W CONTRAST   Final Result   No evidence of pulmonary embolism. Scattered ground-glass and consolidative opacities, greater on the right,   concerning for multifocal pneumonia. Clinical correlation recommended. XR CHEST PORTABLE   Final Result   Nodular density in the right parahilar region of 2.5 cm. Further evaluation   with CT chest recommended. XR CHEST PORTABLE    (Results Pending)       Assessment:  Principal Problem:    Respiratory failure ([address]  Active Problems:    Severe protein-calorie malnutrition ([address]  Resolved Problems:    * No resolved hospital problems.  *      Plan: History of present illness from History and Physical:  80 y.o. male  has a past medical history of respiratory failure, PEG, repeated urinary infection, lives at nursing home acquired hypothyroidism, Acute renal failure ([address] Arthritis, Asthma, History of cardiovascular stress test, and Hypertension. presented with respiratory failure. Chest x-ray may suggest potential lesion. Patient unable to tolerate a CAT scan. He has a chronic Shipman  appears alert and awake with no acute distress and is able to answer simple  questions. On direct questioning, patient denied any  resting ongoing chest pain, resting SOB, hemoptysis, productive cough, fever, ongoing palpitation, active abdominal pain, hematemesis, rectal bleeding, nelda, any other  and GI complaints and any new focal neuro deficits . Hemoglobin 10.3. WBC 11.7. Creatinine 1.1. BNP 2423. Lactate is 3.5    Septic shock due to b/l pna complicated by dehydration / hypovolemic shock & UTI: pulmon on. In icu. Empiric abx with vanco and cefipime. Solucortef x 5 doses  Old left stroke with resulting right weakness  H/o htn: monitor in icu  Indwelling shipman with uti / pyelo: Ucx ox pos gram (-) beny adequately covered  Dementia by hx. Vascular component from #2? Tsh 6.5 in 11/21. No thyroid meds on home med list  Recent d/c from here 6/24  Dvt prophy on systemic anticoagulation with xarelto    NOTE: This report was transcribed using voice recognition software. Every effort was made to ensure accuracy; however, inadvertent computerized transcription errors may be present.      Electronically signed by Maddy Shipman MD on 7/22/2022 at 5:56 PM

## 2022-07-22 NOTE — PLAN OF CARE
Problem: Discharge Planning  Goal: Discharge to home or other facility with appropriate resources  Outcome: Progressing     Problem: Skin/Tissue Integrity  Goal: Absence of new skin breakdown  Description: 1. Monitor for areas of redness and/or skin breakdown  2. Assess vascular access sites hourly  3. Every 4-6 hours minimum:  Change oxygen saturation probe site  4. Every 4-6 hours:  If on nasal continuous positive airway pressure, respiratory therapy assess nares and determine need for appliance change or resting period.   Outcome: Progressing     Problem: ABCDS Injury Assessment  Goal: Absence of physical injury  Outcome: Progressing     Problem: Safety - Adult  Goal: Free from fall injury  Outcome: Progressing     Problem: Nutrition Deficit:  Goal: Optimize nutritional status  Outcome: Progressing     Problem: Pain  Goal: Verbalizes/displays adequate comfort level or baseline comfort level  Outcome: Progressing  Flowsheets  Taken 7/22/2022 0000  Verbalizes/displays adequate comfort level or baseline comfort level:   Encourage patient to monitor pain and request assistance   Assess pain using appropriate pain scale  Taken 7/21/2022 2000  Verbalizes/displays adequate comfort level or baseline comfort level:   Encourage patient to monitor pain and request assistance   Assess pain using appropriate pain scale

## 2022-07-23 ENCOUNTER — APPOINTMENT (OUTPATIENT)
Dept: GENERAL RADIOLOGY | Age: 72
DRG: 871 | End: 2022-07-23
Payer: MEDICARE

## 2022-07-23 LAB
ALBUMIN SERPL-MCNC: 2.4 G/DL (ref 3.5–5.2)
ALP BLD-CCNC: 68 U/L (ref 40–129)
ALT SERPL-CCNC: <5 U/L (ref 0–40)
ANION GAP SERPL CALCULATED.3IONS-SCNC: 8 MMOL/L (ref 7–16)
AST SERPL-CCNC: 17 U/L (ref 0–39)
BILIRUB SERPL-MCNC: 0.2 MG/DL (ref 0–1.2)
BUN BLDV-MCNC: 34 MG/DL (ref 6–23)
CALCIUM SERPL-MCNC: 8.2 MG/DL (ref 8.6–10.2)
CHLORIDE BLD-SCNC: 112 MMOL/L (ref 98–107)
CO2: 24 MMOL/L (ref 22–29)
CREAT SERPL-MCNC: 0.8 MG/DL (ref 0.7–1.2)
CULTURE, RESPIRATORY: NORMAL
GFR AFRICAN AMERICAN: >60
GFR NON-AFRICAN AMERICAN: >60 ML/MIN/1.73
GLUCOSE BLD-MCNC: 163 MG/DL (ref 74–99)
HCT VFR BLD CALC: 26.2 % (ref 37–54)
HEMOGLOBIN: 7.8 G/DL (ref 12.5–16.5)
MAGNESIUM: 2.2 MG/DL (ref 1.6–2.6)
MCH RBC QN AUTO: 28 PG (ref 26–35)
MCHC RBC AUTO-ENTMCNC: 29.8 % (ref 32–34.5)
MCV RBC AUTO: 93.9 FL (ref 80–99.9)
PDW BLD-RTO: 15.7 FL (ref 11.5–15)
PHOSPHORUS: 1.6 MG/DL (ref 2.5–4.5)
PLATELET # BLD: 164 E9/L (ref 130–450)
PMV BLD AUTO: 12.6 FL (ref 7–12)
POTASSIUM SERPL-SCNC: 4.4 MMOL/L (ref 3.5–5)
RBC # BLD: 2.79 E12/L (ref 3.8–5.8)
SMEAR, RESPIRATORY: NORMAL
SODIUM BLD-SCNC: 144 MMOL/L (ref 132–146)
TOTAL PROTEIN: 5.3 G/DL (ref 6.4–8.3)
WBC # BLD: 10.8 E9/L (ref 4.5–11.5)

## 2022-07-23 PROCEDURE — 99232 SBSQ HOSP IP/OBS MODERATE 35: CPT | Performed by: INTERNAL MEDICINE

## 2022-07-23 PROCEDURE — 83735 ASSAY OF MAGNESIUM: CPT

## 2022-07-23 PROCEDURE — 6360000002 HC RX W HCPCS: Performed by: INTERNAL MEDICINE

## 2022-07-23 PROCEDURE — 94640 AIRWAY INHALATION TREATMENT: CPT

## 2022-07-23 PROCEDURE — 96366 THER/PROPH/DIAG IV INF ADDON: CPT

## 2022-07-23 PROCEDURE — 2700000000 HC OXYGEN THERAPY PER DAY

## 2022-07-23 PROCEDURE — 6360000002 HC RX W HCPCS: Performed by: NURSE PRACTITIONER

## 2022-07-23 PROCEDURE — 1200000000 HC SEMI PRIVATE

## 2022-07-23 PROCEDURE — 97110 THERAPEUTIC EXERCISES: CPT | Performed by: PHYSICAL THERAPIST

## 2022-07-23 PROCEDURE — 94660 CPAP INITIATION&MGMT: CPT

## 2022-07-23 PROCEDURE — 6370000000 HC RX 637 (ALT 250 FOR IP): Performed by: INTERNAL MEDICINE

## 2022-07-23 PROCEDURE — 2580000003 HC RX 258: Performed by: INTERNAL MEDICINE

## 2022-07-23 PROCEDURE — 80053 COMPREHEN METABOLIC PANEL: CPT

## 2022-07-23 PROCEDURE — 97161 PT EVAL LOW COMPLEX 20 MIN: CPT | Performed by: PHYSICAL THERAPIST

## 2022-07-23 PROCEDURE — 71045 X-RAY EXAM CHEST 1 VIEW: CPT

## 2022-07-23 PROCEDURE — 84100 ASSAY OF PHOSPHORUS: CPT

## 2022-07-23 PROCEDURE — 2580000003 HC RX 258: Performed by: NURSE PRACTITIONER

## 2022-07-23 PROCEDURE — 85027 COMPLETE CBC AUTOMATED: CPT

## 2022-07-23 RX ADMIN — IPRATROPIUM BROMIDE AND ALBUTEROL SULFATE 1 AMPULE: .5; 2.5 SOLUTION RESPIRATORY (INHALATION) at 09:36

## 2022-07-23 RX ADMIN — MUPIROCIN: 20 OINTMENT TOPICAL at 08:41

## 2022-07-23 RX ADMIN — IPRATROPIUM BROMIDE AND ALBUTEROL SULFATE 1 AMPULE: .5; 2.5 SOLUTION RESPIRATORY (INHALATION) at 17:57

## 2022-07-23 RX ADMIN — MINERAL SUPPLEMENT IRON 300 MG / 5 ML STRENGTH LIQUID 100 PER BOX UNFLAVORED 220 MG: at 08:38

## 2022-07-23 RX ADMIN — DIVALPROEX SODIUM 250 MG: 125 CAPSULE, COATED PELLETS ORAL at 08:40

## 2022-07-23 RX ADMIN — AMLODIPINE BESYLATE 2.5 MG: 5 TABLET ORAL at 08:38

## 2022-07-23 RX ADMIN — ATORVASTATIN CALCIUM 40 MG: 40 TABLET, FILM COATED ORAL at 08:38

## 2022-07-23 RX ADMIN — ASPIRIN 81 MG CHEWABLE TABLET 81 MG: 81 TABLET CHEWABLE at 08:37

## 2022-07-23 RX ADMIN — METOPROLOL TARTRATE 25 MG: 25 TABLET, FILM COATED ORAL at 08:38

## 2022-07-23 RX ADMIN — Medication 10 ML: at 08:41

## 2022-07-23 RX ADMIN — HEPARIN 100 UNITS: 100 SYRINGE at 08:40

## 2022-07-23 RX ADMIN — IPRATROPIUM BROMIDE AND ALBUTEROL SULFATE 1 AMPULE: .5; 2.5 SOLUTION RESPIRATORY (INHALATION) at 13:04

## 2022-07-23 RX ADMIN — PANTOPRAZOLE SODIUM 40 MG: 40 TABLET, DELAYED RELEASE ORAL at 06:03

## 2022-07-23 RX ADMIN — RIVAROXABAN 20 MG: 20 TABLET, FILM COATED ORAL at 08:40

## 2022-07-23 RX ADMIN — DIVALPROEX SODIUM 250 MG: 125 CAPSULE, COATED PELLETS ORAL at 14:30

## 2022-07-23 RX ADMIN — IPRATROPIUM BROMIDE AND ALBUTEROL SULFATE 1 AMPULE: .5; 2.5 SOLUTION RESPIRATORY (INHALATION) at 06:09

## 2022-07-23 RX ADMIN — NYSTATIN 500000 UNITS: 100000 SUSPENSION ORAL at 18:34

## 2022-07-23 RX ADMIN — VANCOMYCIN HYDROCHLORIDE 1250 MG: 10 INJECTION, POWDER, LYOPHILIZED, FOR SOLUTION INTRAVENOUS at 06:38

## 2022-07-23 RX ADMIN — DOCUSATE SODIUM LIQUID 100 MG: 100 LIQUID ORAL at 08:38

## 2022-07-23 RX ADMIN — NYSTATIN 500000 UNITS: 100000 SUSPENSION ORAL at 13:09

## 2022-07-23 RX ADMIN — NYSTATIN 500000 UNITS: 100000 SUSPENSION ORAL at 08:39

## 2022-07-23 RX ADMIN — CEFEPIME 2000 MG: 2 INJECTION, POWDER, FOR SOLUTION INTRAVENOUS at 08:46

## 2022-07-23 ASSESSMENT — PAIN SCALES - GENERAL: PAINLEVEL_OUTOF10: 0

## 2022-07-23 NOTE — PROGRESS NOTES
Assessment and Plan  Patient is a 80 y.o. male with the following medical Problems:   Acute hypoxic respiratory failure secondary to nursing home acquired pneumonia  UTI  Severe sepsis secondary to above  Hyperglycemia  Normocytic hypochromic anemia-history of iron deficiency  Leukocytosis  History of CVA  Hypertension  Hypothyroidism  Former smoker    Plan of care:  Scheduled oral care  Continue with tube feeding and advance as tolerated to goal  Patient is on Xarelto which covers for DVT prophylaxis    4. PPI  5. PT/OT  6. Transfer to medical floor    History of Present Illness:   July 23, 2022:  Patient is a 81-year-old male with history of asthma, hypothyroidism, hypertension, and CVA presented to the ED on 7/20/2022 with complaints of shortness of breath and cough x1 day. Patient denies fever, chills, nausea, vomiting, chest pain, palpitations, abdominal pain, urinary symptoms, constipation, or diarrhea. Patient placed on BiPAP in the ED for respiratory distress and given 2 L normal saline bolus. He also received albumin 25 g IV x1 and we will add hydrocortisone as well. He does not require vasopressors at this time for blood pressure support at this time. Patient's UA showed evidence of UTI/pyelonephritis and he has a chronic indwelling Devine that we will change out on admission. Patient also has a PEG tube due to dysphagia as a result of prior CVA. CTA obtained in ED prior to admission showed no evidence of pulmonary embolism. Scattered groundglass and consolidative opacities greater on the right concerning for multifocal pneumonia. Patient remained stable from critical care point of view. His urine output has been maintained. He is tolerating tube feeding. He has minimal secretions, however, he is able to suction himself. Patient is stable to be transferred to medical floor.     Past Medical History:  Past Medical History:   Diagnosis Date    Acquired hypothyroidism 03/09/2016    Acute renal failure ([facility]     Arthritis     Asthma     History of cardiovascular stress test 2016    lexiscan    Hypertension     Stroke (cerebrum) ([facility]         Family History:   Family History   Problem Relation Age of Onset    Heart Failure Mother     Heart Disease Mother         CABG    Heart Failure Father        Allergies:         Elemental sulfur and Penicillins    Social history:  Social History     Socioeconomic History    Marital status: Single     Spouse name: Not on file    Number of children: Not on file    Years of education: Not on file    Highest education level: Not on file   Occupational History    Not on file   Tobacco Use    Smoking status: Former     Types: Cigarettes     Quit date: 7/15/2010     Years since quittin.0    Smokeless tobacco: Former   Substance and Sexual Activity    Alcohol use: No     Alcohol/week: 0.0 standard drinks    Drug use: No    Sexual activity: Not Currently   Other Topics Concern    Not on file   Social History Narrative    Not on file     Social Determinants of Health     Financial Resource Strain: Not on file   Food Insecurity: Not on file   Transportation Needs: Not on file   Physical Activity: Not on file   Stress: Not on file   Social Connections: Not on file   Intimate Partner Violence: Not on file   Housing Stability: Not on file       Current Medications:     Current Facility-Administered Medications:     vancomycin (VANCOCIN) 1,250 mg in dextrose 5 % 250 mL IVPB, 1,250 mg, IntraVENous, Q24H, MARIELY Landin - CNP, Stopped at 22 0810    mupirocin (BACTROBAN) 2 % ointment, , Topical, BID, Jocy Lovett MD, Given at 22 0841    polyethylene glycol (GLYCOLAX) packet 17 g, 17 g, Oral, Daily PRN, Freeman Romero MD    [DISCONTINUED] acetaminophen (TYLENOL) tablet 650 mg, 650 mg, Oral, Q6H PRN **OR** acetaminophen (TYLENOL) suppository 650 mg, 650 mg, Rectal, Q6H PRN, Freeman Romero MD    prochlorperazine (COMPAZINE) injection 10 mg, 10 mg, IntraVENous, Q6H PRN, Rosalinda Schirmer., MD    acetaminophen (TYLENOL) suspension 650 mg, 650 mg, Per G Tube, Q6H PRN, Daniela Guzmán MD    amLODIPine [facility]) tablet 2.5 mg, 2.5 mg, PEG Tube, Daily, Daniela Guzmán MD, 2.5 mg at 07/23/22 0656    aspirin chewable tablet 81 mg, 81 mg, PEG Tube, Daily, Daniela Guzmán MD, 81 mg at 07/23/22 0837    atorvastatin (LIPITOR) tablet 40 mg, 40 mg, PEG Tube, Daily, Daniela Guzmán MD, 40 mg at 07/23/22 1452    ferrous sulfate 300 (60 Fe) MG/5ML syrup 220 mg, 220 mg, Oral, Daily, Daniela Guzmán MD, 220 mg at 07/23/22 6728    melatonin disintegrating tablet 5 mg, 5 mg, PEG Tube, Nightly PRN, Daniela Guzmán MD    metoprolol tartrate (LOPRESSOR) tablet 25 mg, 25 mg, PEG Tube, BID, Daniela Guzmán MD, 25 mg at 07/23/22 7340    nitroGLYCERIN (NITROSTAT) SL tablet 0.4 mg, 0.4 mg, SubLINGual, Q5 Min PRN, Daniela Guzmán MD    OLANZapine [facility]) tablet 5 mg, 5 mg, PEG Tube, Nightly, Daniela Guzmán MD, 5 mg at 07/22/22 2118    rivaroxaban (XARELTO) tablet 20 mg, 20 mg, PEG Tube, Daily with breakfast, Daniela Guzmán MD, 20 mg at 07/23/22 0840    nystatin (MYCOSTATIN) 564895 UNIT/ML suspension 500,000 Units, 5 mL, Mouth/Throat, 4x Daily, Daniela Guzmán MD, 500,000 Units at 07/23/22 0839    docusate sodium (COLACE) 150 MG/15ML liquid 100 mg, 100 mg, Per G Tube, BID, Daniela Guzmán MD, 100 mg at 07/23/22 4819    divalproex (DEPAKOTE SPRINKLE) capsule 250 mg, 250 mg, Oral, TID, Daniela Guzmán MD, 250 mg at 07/23/22 0840    sodium chloride flush 0.9 % injection 5-40 mL, 5-40 mL, IntraVENous, 2 times per day, Daniela Guzmán MD, 10 mL at 07/23/22 0841    sodium chloride flush 0.9 % injection 5-40 mL, 5-40 mL, IntraVENous, PRN, Daniela Guzmán MD    0.9 % sodium chloride infusion, , IntraVENous, PRN, Daniela Guzmán MD    heparin flush 100 UNIT/ML injection 100 Units, 1 mL, IntraVENous, 2 times per day, Daniela Guzmán MD, 100 Units at 07/23/22 0840    heparin flush 100 UNIT/ML injection 100 Units, 1 mL, IntraCATHeter, PRN, Estefani Serrano MD    pantoprazole (PROTONIX) tablet 40 mg, 40 mg, Oral, QAM AC, Estefani Serrano MD, 40 mg at 07/23/22 0603    ipratropium-albuterol (DUONEB) nebulizer solution 1 ampule, 1 ampule, Inhalation, 4x daily, Estefani Serrano MD, 1 ampule at 07/23/22 3635    perflutren lipid microspheres (DEFINITY) injection 1.65 mg, 1.5 mL, IntraVENous, ONCE PRN, Estefani Serrano MD    cefepime (MAXIPIME) 2000 mg IVPB minibag, 2,000 mg, IntraVENous, Q12H, MARIELY Galindo - CNP, Stopped at 07/23/22 0920    Review of Systems:   General: denies weight gain, denies loss of appetite, fever, chills, night sweats. HEENT: denies headaches, dizziness, head trauma, visual changes, eye pain, tinnitus, nosebleeds, hoarseness or throat pain    Respiratory: denies chest pain, dyspnea, cough and hemoptysis  Cardiovascular: denies orthopnea, paroxysmal nocturnal dyspnea, leg swelling, and previous heart attack. Gastrointestinal: denies pain, nausea vomiting, diarrhea, constipation, melena or bleeding.   Genitourinary: denies hematuria, frequency, urgency or dysuria  Neurology: denies syncope, seizures, paralysis, paraesthesia   Endocrine: denies polyuria, polydipsia, skin or hair changes, and heat or cold intolerance  Musculoskeletal: denies joint pain, swelling, arthritis or myalgia  Hematologic: denies bleeding, adenopathy and easy bruising  Skin: denies rashes and skin discoloration  Psychiatry: denies depression    Physical Exam:   Vital Signs:  /60   Pulse 67   Temp 97.8 °F (36.6 °C) (Oral)   Resp 12   Ht 5' 11\" (1.803 m)   Wt 151 lb 6.4 oz (68.7 kg)   SpO2 97%   BMI 21.12 kg/m²     Input/Output:  In: 2954.2 [I.V.:704.7; NG/GT:2206]  Out: 750     Oxygen requirements: RA    Ventilator Information:       General appearance: not in pain or distress, in no respiratory distress    HEENT: Atraumatic/normocephalic, EOMI, SHILPA, pharynx clear, moist mucosa, redness of the uvula appreciated,   Neck: Supple, no jugular venous distension, lymphadenopathy, thyromegaly or carotid bruits  Chest: Decreased breath sounds, no wheezing, no crackles and no tenderness over ribs   Cardiovascular: Normal S1 , S2, regular rate and rhythm, no murmur, rub or gallop  Abdomen: Normal sounds present, soft, lax with no tenderness, no hepatosplenomegaly, and no masses , +ve PEG  Extremities: No edema. Pulses are equally present. Skin: intact, no rashes   Neurologic: Alert and oriented x 2-3, left is weaker than right     Investigations:  Labs, radiological imaging and cardiac work up were reviewed        ICU STAFF PHYSICIAN NOTE OF PERSONAL INVOLVEMENT IN CARE  As the attending physician, I certify that I personally reviewed the patient's history and personally examined the patient to confirm the physical findings described above, and that I reviewed the relevant imaging studies and available reports. I also discussed the differential diagnosis and all of the proposed management plans with the patient and individuals accompanying the patient to this visit. They had the opportunity to ask questions about the proposed management plans and to have those questions answered. This patient has a high probability of sudden, clinically significant deterioration, which requires the highest level of physician preparedness to intervene urgently. I managed/supervised life or organ supporting interventions that required frequent physician assessment. I devoted my full attention to the direct care of this patient for the amount of time indicated below. Time I spent with family or surrogate(s) is included only if the patient was incapable of providing the necessary information or participating in medical decision-making. Time devoted to teaching and to any procedures I billed separately is not included.   Critical Care Time: 35 minutes      Electronically signed by Catalina Sin MD on 7/23/2022 at 12:25 PM

## 2022-07-23 NOTE — PROGRESS NOTES
Pharmacy Consultation Note  (Antibiotic Dosing and Monitoring)    Initial consult date: 7/21/22  Consulting physician/provider: Dr Lissy Hightower  Drug: Vancomycin  Indication: Bacteremia/UTI/Pneumonia    Age/  Gender Height Weight IBW  Allergy Information   71 y.o./male 5' 11\" (180.3 cm) 150 lb (68 kg)     Ideal body weight: 75.3 kg (166 lb 0.1 oz)   Elemental sulfur and Penicillins      Renal Function:  Recent Labs     07/21/22  0435 07/22/22  0446 07/23/22  0542   BUN 41* 39* 34*   CREATININE 1.1 0.9 0.8       Intake/Output Summary (Last 24 hours) at 7/23/2022 1314  Last data filed at 7/23/2022 0618  Gross per 24 hour   Intake 2954.16 ml   Output 750 ml   Net 2204.16 ml       Vancomycin Monitoring:  Trough:    Recent Labs     07/22/22  1141   VANCOTROUGH 20.8*       Random:  No results for input(s): VANCORANDOM in the last 72 hours. Vancomycin Administration Times:  Recent vancomycin administrations                     vancomycin (VANCOCIN) 1,250 mg in dextrose 5 % 250 mL IVPB (mg) 1,250 mg New Bag 07/23/22 0638    vancomycin (VANCOCIN) 750 mg in dextrose 5 % 250 mL IVPB (mg) 750 mg New Bag 07/22/22 0050     750 mg New Bag 07/21/22 1207    vancomycin (VANCOCIN) 1,750 mg in dextrose 5 % 500 mL IVPB (mg) 1,750 mg New Bag 07/21/22 0035               Assessment:  Patient is a 80 y.o. male who has been initiated on vancomycin  Estimated Creatinine Clearance: 83 mL/min (based on SCr of 0.8 mg/dL).   To dose vancomycin, pharmacy will be utilizing [employer] calculation software for goal AUC/JANKI 400-600 mg/L-hr  7/22: Trough @ 1141 = 20.8 mcg/mL;     Plan:  Vancomycin 1250 mg IV every 24 hours  Levels when appropriate  Will continue to monitor renal function   Clinical pharmacy to follow      Timur Munoz PharmD 7/23/2022 1:14 PM   [phone]

## 2022-07-23 NOTE — PROGRESS NOTES
Physical Therapy Initial Evaluation/Plan of Care    Room #:  IC12/IC12-01  Patient Name: Nunu Johnson  YOB: 1950  MRN: [id number]    Date of Service: 7/23/2022     Tentative placement recommendation: Delroy Sainz   Equipment recommendation: Patient has needed equipment       Evaluating Physical Therapist: Can Santo, PT Lic.   #[id number]      Specific Provider Orders/Date/Referring Provider :  07/22/22 [facility]    PT eval and treat  Start:  07/22/22 1445,   End:  07/22/22 1445,   ONE TIME,   Standing Count:  1 Occurrences,   Neelima Stanley MD     Admitting Diagnosis:   Respiratory failure ([facility] [J96.90]  Septicemia ([facility] [A41.9]  Acute respiratory failure, unspecified whether with hypoxia or hypercapnia ([facility] [J96.00]  Urinary tract infection associated with indwelling urethral catheter, initial encounter ([facility] [S06.245I, N39.0]       Surgery: none  Visit Diagnoses         Codes    Septicemia ([address]    -  Primary A41.9    Urinary tract infection associated with indwelling urethral catheter, initial encounter ([facility]     T83.511A, N39.0            Patient Active Problem List   Diagnosis    Inguinal hernia    Acute renal failure ([address]    Acute cholecystitis    Acute blood loss anemia    Acquired hypothyroidism    Gross hematuria    Hypertension    History of stroke    Respiratory failure ([address]    Severe protein-calorie malnutrition ([address]        ASSESSMENT of Current Deficits Patient exhibits decreased strength, balance, and endurance impairing functional mobility, transfers, gait , gait distance, and tolerance to activity          PHYSICAL THERAPY  PLAN OF CARE       Physical therapy plan of care is established based on physician order,  patient diagnosis and clinical assessment    Current Treatment Recommendations:    -Bed Mobility: Lower extremity exercises , Upper extremity exercises , and Trunk control activities     PT long term treatment goals are located in below grid    Patient and or family understand(s) diagnosis, prognosis, and plan of care. Frequency of treatments: Patient will be seen  daily. Prior Level of Function: Patient  bed bound, staff at [facility] uses lift to get client to [facility]. Rehab Potential: poor  for baseline    Past medical history:   Past Medical History:   Diagnosis Date    Acquired hypothyroidism 03/09/2016    Acute renal failure ([address]     Arthritis     Asthma     History of cardiovascular stress test 03/23/2016    lexiscan    Hypertension     Stroke (cerebrum) [facility])      Past Surgical History:   Procedure Laterality Date    CHOLECYSTECTOMY  7/17/2015    HERNIA REPAIR  2005    Dr Noah Rivero        SUBJECTIVE:    Precautions: Activity as tolerated,     ,      Social history: Patient lives in a skilled nursing facility      Equipment owned: Wheelchair,       [address]   How much difficulty turning over in bed?: A Lot  How much difficulty sitting down on / standing up from a chair with arms?: Unable  How much difficulty moving from lying on back to sitting on side of bed?: Unable  How much help from another person moving to and from a bed to a chair?: Total  How much help from another person needed to walk in hospital room?: Total  How much help from another person for climbing 3-5 steps with a railing?: Total  AM-PAC Inpatient Mobility Raw Score : 7  AM-PAC Inpatient T-Scale Score : 26.42  Mobility Inpatient CMS 0-100% Score: 92.36  Mobility Inpatient CMS G-Code Modifier : CM    Nursing cleared patient for PT evaluation. The admitting diagnosis and active problem list as listed above have been reviewed prior to the initiation of this evaluation. OBJECTIVE;   Initial Evaluation  Date: 7/23//2022 Treatment Date:     Short Term/ Long Term   Goals   Was pt agreeable to Eval/treatment?  Yes  To be met in 2 days   Pain level   0/10        Bed Mobility    Rolling: Maximal assist of 1    Supine to sit: Dependent of  2    Sit to supine: Dependent of  2    Scooting: Dependent of  2    Rolling: Modified Independent    Supine to sit: Maximal assist of 1    Sit to supine: Maximal assist of 1    Scooting: Maximal assist of 1     Transfers Sit to stand: Not assessed     Sit to stand: Not assessed      Ambulation    not assessed      not assessed    Stair negotiation: ascended and descended   Not assessed     Not assessed     ROM Within functional limits    Increase range of motion 10% of affected joints    Strength BUE:  refer to OT eval  RLE:  2+/5  LLE:  2+/5  Increase strength in affected mm groups by 1/3 grade   Balance Sitting EOB:  not assessed    Dynamic Standing:  not assessed    Sitting EOB:  not assessed    Dynamic Standing: not assessed       Patient is Alert & Oriented x person, place, time, and situation and follows directions    Sensation:  Patient  denies numbness/tingling   Edema:  no   Endurance: poor           Treatment:  Patient practiced and was instructed/facilitated in the following treatment: Patient therex, AAROM           Therapeutic Exercises:  ankle pumps, quad sets, and heel slide  x 10 reps. At end of session, patient in bed with alarm call light and phone within reach,  all lines and tubes intact, nursing notified. Patient would benefit from continued skilled Physical Therapy to improve functional independence and quality of life. Patient's/ family goals   none stated    Time in  10:30  Time out  10:58    Total Treatment Time  8 minutes    Evaluation time includes thorough review of current medical information, gathering information on past medical history/social history and prior level of function, completion of standardized testing/informal observation of tasks, assessment of data, and development of Plan of care and goals.      CPT codes:  Low Complexity PT evaluation (90698)  Therapeutic exercises (53797)   8 minutes  1 unit(s)    Michelle Bryson, PT

## 2022-07-23 NOTE — PROGRESS NOTES
Admitting Date and Time: 7/20/2022  6:39 PM  Admit Dx: Respiratory failure ([facility] [J96.90]  Septicemia ([facility] [A41.9]  Acute respiratory failure, unspecified whether with hypoxia or hypercapnia ([facility] [J96.00]  Urinary tract infection associated with indwelling urethral catheter, initial encounter ([facility] [T83.511A, N39.0]    Subjective:    Pleasant with no complaints attempting to pass in his masks at the bedrail to keep track of its location  Per RN: alert and awake    ROS: denies fever, chills, cp, sob, n/v, HA unless stated above.     vancomycin  1,250 mg IntraVENous Q24H    mupirocin   Topical BID    amLODIPine  2.5 mg PEG Tube Daily    aspirin  81 mg PEG Tube Daily    atorvastatin  40 mg PEG Tube Daily    ferrous sulfate  220 mg Oral Daily    metoprolol tartrate  25 mg PEG Tube BID    OLANZapine  5 mg PEG Tube Nightly    rivaroxaban  20 mg PEG Tube Daily with breakfast    nystatin  5 mL Mouth/Throat 4x Daily    docusate sodium  100 mg Per G Tube BID    divalproex  250 mg Oral TID    sodium chloride flush  5-40 mL IntraVENous 2 times per day    heparin flush  1 mL IntraVENous 2 times per day    pantoprazole  40 mg Oral QAM AC    ipratropium-albuterol  1 ampule Inhalation 4x daily    cefepime  2,000 mg IntraVENous Q12H     polyethylene glycol, 17 g, Daily PRN  acetaminophen, 650 mg, Q6H PRN  prochlorperazine, 10 mg, Q6H PRN  acetaminophen, 650 mg, Q6H PRN  melatonin, 5 mg, Nightly PRN  nitroGLYCERIN, 0.4 mg, Q5 Min PRN  sodium chloride flush, 5-40 mL, PRN  sodium chloride, , PRN  heparin flush, 1 mL, PRN  perflutren lipid microspheres, 1.5 mL, ONCE PRN       Objective:    /70   Pulse 72   Temp 97.8 °F (36.6 °C) (Oral)   Resp 17   Ht 5' 11\" (1.803 m)   Wt 151 lb 6.4 oz (68.7 kg)   SpO2 97%   BMI 21.12 kg/m²   General Appearance: alert and oriented to person, place and time and in no acute distress  Skin: warm and dry  Head: normocephalic and atraumatic  Eyes: pupils equal, round, and reactive to light, of respiratory failure, PEG, repeated urinary infection, lives at nursing home acquired hypothyroidism, Acute renal failure ([facility] Arthritis, Asthma, History of cardiovascular stress test, and Hypertension. presented with respiratory failure. Chest x-ray may suggest potential lesion. Patient unable to tolerate a CAT scan. He has a chronic Shipman  appears alert and awake with no acute distress and is able to answer simple  questions. On direct questioning, patient denied any  resting ongoing chest pain, resting SOB, hemoptysis, productive cough, fever, ongoing palpitation, active abdominal pain, hematemesis, rectal bleeding, nelda, any other  and GI complaints and any new focal neuro deficits . Hemoglobin 10.3. WBC 11.7. Creatinine 1.1. BNP 2423. Lactate is 3.5    Septic shock due to b/l pna complicated by dehydration / hypovolemic shock & UTI: pulmon on.  7/23 moved from ICU to floor. Empiric abx with vanco and cefipime. S/p Solucortef x 5 doses  Old left stroke with resulting right weakness  H/o htn: leaving icu today. Continue to monitor on floor  Indwelling shipman with uti / pyelo: Ucx ox pos gram (-) beny adequately covered empirically but as of 7/23/2022 at 5:15 PM ID and sensitivity still pending  Dementia by hx. Vascular component from #2? Tsh 6.5 in 11/21. No thyroid meds on home med list  Recent d/c from here 6/24  Dvt prophy on systemic anticoagulation with xarelto  Full code  Disposition back to the nursing home    NOTE: This report was transcribed using voice recognition software. Every effort was made to ensure accuracy; however, inadvertent computerized transcription errors may be present.      Electronically signed by Lucita Beverly MD on 7/23/2022 at 9:31 AM

## 2022-07-24 LAB
ALBUMIN SERPL-MCNC: 2.4 G/DL (ref 3.5–5.2)
ALP BLD-CCNC: 75 U/L (ref 40–129)
ALT SERPL-CCNC: 5 U/L (ref 0–40)
ANION GAP SERPL CALCULATED.3IONS-SCNC: 5 MMOL/L (ref 7–16)
AST SERPL-CCNC: 17 U/L (ref 0–39)
BILIRUB SERPL-MCNC: 0.3 MG/DL (ref 0–1.2)
BUN BLDV-MCNC: 28 MG/DL (ref 6–23)
CALCIUM SERPL-MCNC: 8.3 MG/DL (ref 8.6–10.2)
CHLORIDE BLD-SCNC: 108 MMOL/L (ref 98–107)
CO2: 28 MMOL/L (ref 22–29)
CREAT SERPL-MCNC: 0.9 MG/DL (ref 0.7–1.2)
GFR AFRICAN AMERICAN: >60
GFR NON-AFRICAN AMERICAN: >60 ML/MIN/1.73
GLUCOSE BLD-MCNC: 121 MG/DL (ref 74–99)
HCT VFR BLD CALC: 27.8 % (ref 37–54)
HEMOGLOBIN: 8.4 G/DL (ref 12.5–16.5)
MAGNESIUM: 2.1 MG/DL (ref 1.6–2.6)
MCH RBC QN AUTO: 28.7 PG (ref 26–35)
MCHC RBC AUTO-ENTMCNC: 30.2 % (ref 32–34.5)
MCV RBC AUTO: 94.9 FL (ref 80–99.9)
ORGANISM: ABNORMAL
ORGANISM: ABNORMAL
PDW BLD-RTO: 15.9 FL (ref 11.5–15)
PHOSPHORUS: 1.3 MG/DL (ref 2.5–4.5)
PLATELET # BLD: 182 E9/L (ref 130–450)
PMV BLD AUTO: 12.6 FL (ref 7–12)
POTASSIUM SERPL-SCNC: 4.3 MMOL/L (ref 3.5–5)
RBC # BLD: 2.93 E12/L (ref 3.8–5.8)
SODIUM BLD-SCNC: 141 MMOL/L (ref 132–146)
TOTAL PROTEIN: 5.6 G/DL (ref 6.4–8.3)
URINE CULTURE, ROUTINE: ABNORMAL
URINE CULTURE, ROUTINE: ABNORMAL
WBC # BLD: 8.2 E9/L (ref 4.5–11.5)

## 2022-07-24 PROCEDURE — 6370000000 HC RX 637 (ALT 250 FOR IP): Performed by: INTERNAL MEDICINE

## 2022-07-24 PROCEDURE — 97165 OT EVAL LOW COMPLEX 30 MIN: CPT

## 2022-07-24 PROCEDURE — 99232 SBSQ HOSP IP/OBS MODERATE 35: CPT | Performed by: INTERNAL MEDICINE

## 2022-07-24 PROCEDURE — 2580000003 HC RX 258: Performed by: INTERNAL MEDICINE

## 2022-07-24 PROCEDURE — 94640 AIRWAY INHALATION TREATMENT: CPT

## 2022-07-24 PROCEDURE — 36415 COLL VENOUS BLD VENIPUNCTURE: CPT

## 2022-07-24 PROCEDURE — 6360000002 HC RX W HCPCS: Performed by: NURSE PRACTITIONER

## 2022-07-24 PROCEDURE — 83735 ASSAY OF MAGNESIUM: CPT

## 2022-07-24 PROCEDURE — 96366 THER/PROPH/DIAG IV INF ADDON: CPT

## 2022-07-24 PROCEDURE — 2580000003 HC RX 258: Performed by: NURSE PRACTITIONER

## 2022-07-24 PROCEDURE — 96367 TX/PROPH/DG ADDL SEQ IV INF: CPT

## 2022-07-24 PROCEDURE — 85027 COMPLETE CBC AUTOMATED: CPT

## 2022-07-24 PROCEDURE — 2500000003 HC RX 250 WO HCPCS: Performed by: INTERNAL MEDICINE

## 2022-07-24 PROCEDURE — 84100 ASSAY OF PHOSPHORUS: CPT

## 2022-07-24 PROCEDURE — 80053 COMPREHEN METABOLIC PANEL: CPT

## 2022-07-24 PROCEDURE — 1200000000 HC SEMI PRIVATE

## 2022-07-24 PROCEDURE — 6360000002 HC RX W HCPCS: Performed by: INTERNAL MEDICINE

## 2022-07-24 RX ORDER — LANSOPRAZOLE
30 KIT
Status: DISCONTINUED | OUTPATIENT
Start: 2022-07-24 | End: 2022-07-24

## 2022-07-24 RX ORDER — LANSOPRAZOLE
30 KIT
Status: DISCONTINUED | OUTPATIENT
Start: 2022-07-25 | End: 2022-07-27 | Stop reason: HOSPADM

## 2022-07-24 RX ADMIN — MUPIROCIN: 20 OINTMENT TOPICAL at 23:30

## 2022-07-24 RX ADMIN — MUPIROCIN: 20 OINTMENT TOPICAL at 10:27

## 2022-07-24 RX ADMIN — Medication 10 ML: at 10:28

## 2022-07-24 RX ADMIN — ATORVASTATIN CALCIUM 40 MG: 40 TABLET, FILM COATED ORAL at 10:24

## 2022-07-24 RX ADMIN — Medication 10 ML: at 23:28

## 2022-07-24 RX ADMIN — IPRATROPIUM BROMIDE AND ALBUTEROL SULFATE 1 AMPULE: .5; 2.5 SOLUTION RESPIRATORY (INHALATION) at 10:20

## 2022-07-24 RX ADMIN — DOCUSATE SODIUM LIQUID 100 MG: 100 LIQUID ORAL at 00:45

## 2022-07-24 RX ADMIN — NYSTATIN 500000 UNITS: 100000 SUSPENSION ORAL at 13:42

## 2022-07-24 RX ADMIN — DOCUSATE SODIUM LIQUID 100 MG: 100 LIQUID ORAL at 10:26

## 2022-07-24 RX ADMIN — NYSTATIN 500000 UNITS: 100000 SUSPENSION ORAL at 10:27

## 2022-07-24 RX ADMIN — DOCUSATE SODIUM LIQUID 100 MG: 100 LIQUID ORAL at 23:35

## 2022-07-24 RX ADMIN — MINERAL SUPPLEMENT IRON 300 MG / 5 ML STRENGTH LIQUID 100 PER BOX UNFLAVORED 220 MG: at 23:30

## 2022-07-24 RX ADMIN — SODIUM PHOSPHATE, MONOBASIC, MONOHYDRATE AND SODIUM PHOSPHATE, DIBASIC, ANHYDROUS 20 MMOL: 276; 142 INJECTION, SOLUTION INTRAVENOUS at 17:26

## 2022-07-24 RX ADMIN — METOPROLOL TARTRATE 25 MG: 25 TABLET, FILM COATED ORAL at 10:24

## 2022-07-24 RX ADMIN — ASPIRIN 81 MG CHEWABLE TABLET 81 MG: 81 TABLET CHEWABLE at 10:24

## 2022-07-24 RX ADMIN — HEPARIN 100 UNITS: 100 SYRINGE at 08:45

## 2022-07-24 RX ADMIN — DIVALPROEX SODIUM 250 MG: 125 CAPSULE, COATED PELLETS ORAL at 16:31

## 2022-07-24 RX ADMIN — IPRATROPIUM BROMIDE AND ALBUTEROL SULFATE 1 AMPULE: .5; 2.5 SOLUTION RESPIRATORY (INHALATION) at 06:15

## 2022-07-24 RX ADMIN — RIVAROXABAN 20 MG: 20 TABLET, FILM COATED ORAL at 10:24

## 2022-07-24 RX ADMIN — DIVALPROEX SODIUM 250 MG: 125 CAPSULE, COATED PELLETS ORAL at 23:35

## 2022-07-24 RX ADMIN — IPRATROPIUM BROMIDE AND ALBUTEROL SULFATE 1 AMPULE: .5; 2.5 SOLUTION RESPIRATORY (INHALATION) at 19:09

## 2022-07-24 RX ADMIN — HEPARIN 100 UNITS: 100 SYRINGE at 23:29

## 2022-07-24 RX ADMIN — NYSTATIN 500000 UNITS: 100000 SUSPENSION ORAL at 16:31

## 2022-07-24 RX ADMIN — AMLODIPINE BESYLATE 2.5 MG: 5 TABLET ORAL at 10:25

## 2022-07-24 RX ADMIN — METOPROLOL TARTRATE 25 MG: 25 TABLET, FILM COATED ORAL at 00:46

## 2022-07-24 RX ADMIN — DIVALPROEX SODIUM 250 MG: 125 CAPSULE, COATED PELLETS ORAL at 10:26

## 2022-07-24 RX ADMIN — METOPROLOL TARTRATE 25 MG: 25 TABLET, FILM COATED ORAL at 23:28

## 2022-07-24 RX ADMIN — OLANZAPINE 5 MG: 5 TABLET, FILM COATED ORAL at 00:44

## 2022-07-24 RX ADMIN — CEFEPIME 2000 MG: 2 INJECTION, POWDER, FOR SOLUTION INTRAVENOUS at 23:23

## 2022-07-24 RX ADMIN — LANSOPRAZOLE 30 MG: KIT at 06:26

## 2022-07-24 RX ADMIN — IPRATROPIUM BROMIDE AND ALBUTEROL SULFATE 1 AMPULE: .5; 2.5 SOLUTION RESPIRATORY (INHALATION) at 13:33

## 2022-07-24 RX ADMIN — OLANZAPINE 5 MG: 5 TABLET, FILM COATED ORAL at 23:35

## 2022-07-24 RX ADMIN — MUPIROCIN: 20 OINTMENT TOPICAL at 00:40

## 2022-07-24 RX ADMIN — CEFEPIME 2000 MG: 2 INJECTION, POWDER, FOR SOLUTION INTRAVENOUS at 10:36

## 2022-07-24 RX ADMIN — Medication 10 ML: at 00:45

## 2022-07-24 RX ADMIN — NYSTATIN 500000 UNITS: 100000 SUSPENSION ORAL at 23:34

## 2022-07-24 RX ADMIN — DIVALPROEX SODIUM 250 MG: 125 CAPSULE, COATED PELLETS ORAL at 00:43

## 2022-07-24 RX ADMIN — CEFEPIME 2000 MG: 2 INJECTION, POWDER, FOR SOLUTION INTRAVENOUS at 00:56

## 2022-07-24 RX ADMIN — NYSTATIN 500000 UNITS: 100000 SUSPENSION ORAL at 00:44

## 2022-07-24 RX ADMIN — HEPARIN 100 UNITS: 100 SYRINGE at 00:47

## 2022-07-24 ASSESSMENT — PAIN SCALES - GENERAL: PAINLEVEL_OUTOF10: 0

## 2022-07-24 NOTE — PROGRESS NOTES
Pulmonary/Critical Care Progress Note    We are following patient for acute hypoxemic respiratory failure, pneumonia, MRSA bacteremia, Pseudomonas bacteremia, UTI history of prior stroke, diabetes mellitus type 2    SUBJECTIVE:  The patient is comfortable and is not having any significant respiratory difficulty. He continues on vancomycin and cefepime for sepsis as noted above. He was transferred to the floor yesterday for further treatment and evaluation. He is benefiting from aerosolized bronchodilators which are helping to keep his secretions under control. Serum phosphorus is markedly diminished. He will need 30 mmol of sodium phosphate IV. MEDICATIONS:   [START ON 7/25/2022] lansoprazole  30 mg Per G Tube QAM AC    vancomycin  1,250 mg IntraVENous Q24H    mupirocin   Topical BID    amLODIPine  2.5 mg PEG Tube Daily    aspirin  81 mg PEG Tube Daily    atorvastatin  40 mg PEG Tube Daily    ferrous sulfate  220 mg Oral Daily    metoprolol tartrate  25 mg PEG Tube BID    OLANZapine  5 mg PEG Tube Nightly    rivaroxaban  20 mg PEG Tube Daily with breakfast    nystatin  5 mL Mouth/Throat 4x Daily    docusate sodium  100 mg Per G Tube BID    divalproex  250 mg Oral TID    sodium chloride flush  5-40 mL IntraVENous 2 times per day    heparin flush  1 mL IntraVENous 2 times per day    ipratropium-albuterol  1 ampule Inhalation 4x daily    cefepime  2,000 mg IntraVENous Q12H      sodium chloride       polyethylene glycol, [DISCONTINUED] acetaminophen **OR** acetaminophen, prochlorperazine, acetaminophen, melatonin, nitroGLYCERIN, sodium chloride flush, sodium chloride, heparin flush, perflutren lipid microspheres      REVIEW OF SYSTEMS:  Constitutional: Denies fever, weight loss, night sweats, and fatigue  Skin: Denies pigmentation, dark lesions, and rashes   HEENT: Denies hearing loss, tinnitus, ear drainage, epistaxis, sore throat, and hoarseness.   Cardiovascular: Denies palpitations, chest pain, and chest pressure. Respiratory: Denies cough, dyspnea at rest, hemoptysis, apnea, and choking. Gastrointestinal: Denies nausea, vomiting, poor appetite, diarrhea, heartburn or reflux  Genitourinary: Denies dysuria, frequency, urgency or hematuria  Musculoskeletal: Denies myalgias, muscle weakness, and bone pain; acknowledges right hemiparesis  Neurological: Denies dizziness, vertigo, headache, and focal weakness  Psychological: Denies anxiety and depression  Endocrine: Denies heat intolerance and cold intolerance  Hematopoietic/Lymphatic: Denies bleeding problems and blood transfusions    OBJECTIVE:  Vitals:    07/24/22 1024   BP: (!) 142/97   Pulse: 91   Resp:    Temp:    SpO2:         O2 Flow Rate (L/min): 2 L/min  O2 Device: None (Room air)    PHYSICAL EXAM:  Constitutional: No fever, chills, diaphoresis  Skin: No skin rash, no skin breakdown  HEENT: Unremarkable  Neck: No JVD, lymphadenopathy, thyromegaly  Cardiovascular: S1, S2 regular. No S3 murmurs rubs present  Respiratory: Clear to auscultation bilaterally except for some bibasilar crackles. No wheezes are present  Gastrointestinal: Soft, flat, nontender. PEG tube is in place  Genitourinary: No grossly bloody urine  Extremities: No clubbing, cyanosis, or edema  Neurological: Awake, alert, oriented x3. No evidence of focal motor or sensory deficits other than his baseline aphasia and right hemiparesis  Psychological: Good spirits. Appropriate affect.     LABS:  WBC   Date Value Ref Range Status   07/24/2022 8.2 4.5 - 11.5 E9/L Final   07/23/2022 10.8 4.5 - 11.5 E9/L Final   07/22/2022 10.9 4.5 - 11.5 E9/L Final     Hemoglobin   Date Value Ref Range Status   07/24/2022 8.4 (L) 12.5 - 16.5 g/dL Final   07/23/2022 7.8 (L) 12.5 - 16.5 g/dL Final   07/22/2022 8.0 (L) 12.5 - 16.5 g/dL Final     Hematocrit   Date Value Ref Range Status   07/24/2022 27.8 (L) 37.0 - 54.0 % Final   07/23/2022 26.2 (L) 37.0 - 54.0 % Final   07/22/2022 26.5 (L) 37.0 - 54.0 % Final     MCV Date Value Ref Range Status   07/24/2022 94.9 80.0 - 99.9 fL Final   07/23/2022 93.9 80.0 - 99.9 fL Final   07/22/2022 95.0 80.0 - 99.9 fL Final     Platelets   Date Value Ref Range Status   07/24/2022 182 130 - 450 E9/L Final   07/23/2022 164 130 - 450 E9/L Final   07/22/2022 143 130 - 450 E9/L Final     Sodium   Date Value Ref Range Status   07/24/2022 141 132 - 146 mmol/L Final   07/23/2022 144 132 - 146 mmol/L Final   07/22/2022 140 132 - 146 mmol/L Final     Potassium   Date Value Ref Range Status   07/24/2022 4.3 3.5 - 5.0 mmol/L Final   07/23/2022 4.4 3.5 - 5.0 mmol/L Final   07/22/2022 4.4 3.5 - 5.0 mmol/L Final     Potassium reflex Magnesium   Date Value Ref Range Status   07/20/2022 4.5 3.5 - 5.0 mmol/L Final   06/23/2022 4.3 3.5 - 5.0 mmol/L Final   06/21/2022 5.3 (H) 3.5 - 5.0 mmol/L Final     Chloride   Date Value Ref Range Status   07/24/2022 108 (H) 98 - 107 mmol/L Final   07/23/2022 112 (H) 98 - 107 mmol/L Final   07/22/2022 110 (H) 98 - 107 mmol/L Final     CO2   Date Value Ref Range Status   07/24/2022 28 22 - 29 mmol/L Final   07/23/2022 24 22 - 29 mmol/L Final   07/22/2022 24 22 - 29 mmol/L Final     BUN   Date Value Ref Range Status   07/24/2022 28 (H) 6 - 23 mg/dL Final   07/23/2022 34 (H) 6 - 23 mg/dL Final   07/22/2022 39 (H) 6 - 23 mg/dL Final     Creatinine   Date Value Ref Range Status   07/24/2022 0.9 0.7 - 1.2 mg/dL Final   07/23/2022 0.8 0.7 - 1.2 mg/dL Final   07/22/2022 0.9 0.7 - 1.2 mg/dL Final     Glucose   Date Value Ref Range Status   07/24/2022 121 (H) 74 - 99 mg/dL Final   07/23/2022 163 (H) 74 - 99 mg/dL Final   07/22/2022 124 (H) 74 - 99 mg/dL Final     Calcium   Date Value Ref Range Status   07/24/2022 8.3 (L) 8.6 - 10.2 mg/dL Final   07/23/2022 8.2 (L) 8.6 - 10.2 mg/dL Final   07/22/2022 8.2 (L) 8.6 - 10.2 mg/dL Final     Total Protein   Date Value Ref Range Status   07/24/2022 5.6 (L) 6.4 - 8.3 g/dL Final   07/23/2022 5.3 (L) 6.4 - 8.3 g/dL Final   07/22/2022 5.6 (L) 6.4 - 8.3 g/dL Final     Albumin   Date Value Ref Range Status   07/24/2022 2.4 (L) 3.5 - 5.2 g/dL Final   07/23/2022 2.4 (L) 3.5 - 5.2 g/dL Final   07/22/2022 2.3 (L) 3.5 - 5.2 g/dL Final     Total Bilirubin   Date Value Ref Range Status   07/24/2022 0.3 0.0 - 1.2 mg/dL Final   07/23/2022 0.2 0.0 - 1.2 mg/dL Final   07/22/2022 0.3 0.0 - 1.2 mg/dL Final     Alkaline Phosphatase   Date Value Ref Range Status   07/24/2022 75 40 - 129 U/L Final   07/23/2022 68 40 - 129 U/L Final   07/22/2022 52 40 - 129 U/L Final     AST   Date Value Ref Range Status   07/24/2022 17 0 - 39 U/L Final   07/23/2022 17 0 - 39 U/L Final   07/22/2022 18 0 - 39 U/L Final     ALT   Date Value Ref Range Status   07/24/2022 5 0 - 40 U/L Final   07/23/2022 <5 0 - 40 U/L Final   07/22/2022 5 0 - 40 U/L Final     GFR Non-   Date Value Ref Range Status   07/24/2022 >60 >=60 mL/min/1.73 Final     Comment:     Chronic Kidney Disease: less than 60 ml/min/1.73 sq.m. Kidney Failure: less than 15 ml/min/1.73 sq.m. Results valid for patients 18 years and older. 07/23/2022 >60 >=60 mL/min/1.73 Final     Comment:     Chronic Kidney Disease: less than 60 ml/min/1.73 sq.m. Kidney Failure: less than 15 ml/min/1.73 sq.m. Results valid for patients 18 years and older. 07/22/2022 >60 >=60 mL/min/1.73 Final     Comment:     Chronic Kidney Disease: less than 60 ml/min/1.73 sq.m. Kidney Failure: less than 15 ml/min/1.73 sq.m. Results valid for patients 18 years and older.        GFR    Date Value Ref Range Status   07/24/2022 >60  Final   07/23/2022 >60  Final   07/22/2022 >60  Final     Magnesium   Date Value Ref Range Status   07/24/2022 2.1 1.6 - 2.6 mg/dL Final   07/23/2022 2.2 1.6 - 2.6 mg/dL Final   07/22/2022 2.4 1.6 - 2.6 mg/dL Final     Phosphorus   Date Value Ref Range Status   07/24/2022 1.3 (L) 2.5 - 4.5 mg/dL Final   07/23/2022 1.6 (L) 2.5 - 4.5 mg/dL Final   07/22/2022 2.8 2.5 - 4.5 mg/dL Final     No results for input(s): PH, PO2, PCO2, HCO3, BE, O2SAT in the last 72 hours. RADIOLOGY:  XR CHEST PORTABLE   Final Result   Persistent the bilateral infiltrates. See report CT chest July 20. CTA PULMONARY W CONTRAST   Final Result   No evidence of pulmonary embolism. Scattered ground-glass and consolidative opacities, greater on the right,   concerning for multifocal pneumonia. Clinical correlation recommended. XR CHEST PORTABLE   Final Result   Nodular density in the right parahilar region of 2.5 cm. Further evaluation   with CT chest recommended. PROBLEM LIST:  Principal Problem:    Respiratory failure ([address]  Active Problems:    Severe protein-calorie malnutrition ([address]  Resolved Problems:    * No resolved hospital problems. *      IMPRESSION:  Severe hypophosphatemia  Previous pneumonia, much improved  Pyelonephritis secondary to Pseudomonas  MRSA and Pseudomonas bacteremia  Previous septic shock, improved  History of stroke with aphasia and right hemiparesis      PLAN:  Sodium phosphate infusion  Continue IV antibiotics  Labs in a.m.   Consider discharge in 2-3 more days if patient continues to improve    Electronically signed by Alanis Marks MD on 7/24/2022 at 3:29 PM

## 2022-07-24 NOTE — PROGRESS NOTES
Interventions to include:   * Instruction/training on adapted ADL techniques and AE recommendations to increase functional independence within precautions       * Training on energy conservation strategies, correct breathing pattern and techniques to improve independence/tolerance for self-care routine  * Functional transfer/mobility training/DME recommendations for increased independence, safety, and fall prevention  * Patient/Family education to increase follow through with safety techniques and functional independence  * Recommendation of environmental modifications for increased safety with functional transfers/mobility and ADLs  * Cognitive retraining/development of therapeutic activities to improve problem solving, judgement, memory, and attention for increased safety/participation in ADL/IADL tasks  * Therapeutic exercise to improve motor endurance, ROM, and functional strength for ADLs/functional transfers  * Therapeutic activities to facilitate/challenge dynamic balance, stand tolerance for increased safety and independence with ADLs  * Therapeutic activities to facilitate gross/fine motor skills for increased independence with ADLs  * Positioning to improve skin integrity, interaction with environment and functional independence    Recommended Adaptive Equipment: TBD     Home Living: Pt resides at [facility] owned: SNF equipment     Prior Level of Function: Requires assist with ADLs , dependent with IADLs; joi lift/non-ambulatory. Pain Level: Pt denied pain. Cognition: A&O: 3/4 - however confusion present;  Follows 1-2 step directions   Memory: impaired    Sequencing: impaired   Problem solving: impaired    Judgement/safety: impaired     AMPA   AM-PAC Daily Activity Inpatient   How much help for putting on and taking off regular lower body clothing?: Total  How much help for Bathing?: A Lot  How much help for Toileting?: Total  How much help for putting on and taking off regular upper body clothing?: A Lot  How much help for taking care of personal grooming?: A Lot  How much help for eating meals?: Total  AM-PAC Inpatient Daily Activity Raw Score: 9  AM-PAC Inpatient ADL T-Scale Score : 25.33  ADL Inpatient CMS 0-100% Score: 79.59  ADL Inpatient CMS G-Code Modifier : CL     Functional Assessment:    Initial Eval Status  Date: 7/24/22   Treatment Status  Date: STGs = LTGs  Time frame: 10-14 days   Feeding Dependent   PEG tube    N/A     Grooming Maximal Assist     Moderate Assist    UB Dressing Maximal Assist    Moderate Assist    LB Dressing Dependent     Maximal Assist    Bathing Maximal Assist / Dependent   Assist x 2 for rolling to reach back-side     Maximal Assist x 1    Toileting Dependent   For mgmt of shipman catheter and rear hygiene     Maximal Assist    Bed Mobility  Supine to sit:  Not Assessed   Sit to supine: Not Assessed   Rolling: Maximal Assist x 2 for placement of chux   Scooting: Dependent with use of chux     Supine to sit: Maximal Assist x 2   Sit to supine: Maximal Assist x 2   Rolling:  Moderate Assist x 2    Balance Sitting:     Static: poor trunk support with right lean in semi-supine     Dynamic: poor    Sitting:     Static: fair     Dynamic: fair-   Activity Tolerance fair  minus   Pt will engaged in func act >25 mins for carry over into client centered tasks, interaction with environment and indep in ADLs       Visual/  Perceptual Glasses: Yes    Reports changes in vision since admission: N/a      NA      Hand Dominance: Left      AROM (PROM) Strength Additional Info:  Goal:   RUE  WFL 3/5 Fair  and poor  FMC/dexterity noted during ADL tasks ; tremors    Improve overall RUE strength  for participation in functional tasks   LUE WFL 3/5 Fair  and poor  FMC/dexterity noted during ADL tasks ; tremors    Improve overall LUE strength  for participation in functional tasks     Hearing: [facility]   Sensation:  No c/o numbness or tingling  Tone:  WFL   Edema: Right foot Vitals:  HR at rest: 90 bpm HR with activity:  bpm HR at end of session:  bpm   SpO2 at rest: 96% SpO2 with activity: % SpO2 at end of session: %   BP at rest:  BP with activity:  BP at end of session:      Comments: RN cleared patient for OT. Upon arrival patient in supine. Therapist facilitated and instructed pt on adapted  techniques & compensatory strategies to improve safety and independence with basic ADLs, bed mobility to allow pt to achieve highest level of independence and safely. Pt demonstrated fair minus/poor understanding of education & follow through. At end of session, patient was semi-supine/repositioned (HOB >35 degrees) with call light and phone within reach, all lines and tubes intact. Overall, patient demonstrated  decreased independence and safety during completion of ADL tasks. Pt would benefit from continued skilled OT to increase safety and independence with completion of ADL tasks and functional mobility for improved quality of life. Rehab Potential: Good for established goals. Patient / Family Goal: Not stated       Patient and/or family were instructed on functional diagnosis, prognosis/goals and OT plan of care. Demonstrated fair minus/poor understanding. Eval Complexity: Low    Time In: 10:10 AM   Time Out: 10:30 AM    Total Treatment Time: 0       Min Units   OT Eval Low 97165  X  1    OT Eval Medium 77591      OT Eval High 79720      OT Re-Eval [id number]            ADL/Self Care 58109     Therapeutic Activities 27415       Therapeutic Ex 02320       Orthotic Management 79396       Manual 07462     Neuro Re-Ed 29354       Non-Billable Time        Evaluation Time additionally includes thorough review of current medical information, gathering information on past medical history/social history and prior level of function, interpretation of standardized testing/informal observation of tasks, assessment of data and development of plan of care and goals. Evaluating OT: Katie Beavers OTR/L #[id number]

## 2022-07-24 NOTE — PROGRESS NOTES
Admitting Date and Time: 7/20/2022  6:39 PM  Admit Dx: Respiratory failure ([facility] [J96.90]  Septicemia ([facility] [A41.9]  Acute respiratory failure, unspecified whether with hypoxia or hypercapnia ([facility] [J96.00]  Urinary tract infection associated with indwelling urethral catheter, initial encounter ([place] [address] [E12.226V, N39.0]    Subjective:  Continues to be Pleasant and friendly with no complaints. Per RN: alert and awake    ROS: denies fever, chills, cp, sob, n/v, HA unless stated above.      [START ON 7/25/2022] lansoprazole  30 mg Per G Tube QAM AC    vancomycin  1,250 mg IntraVENous Q24H    mupirocin   Topical BID    amLODIPine  2.5 mg PEG Tube Daily    aspirin  81 mg PEG Tube Daily    atorvastatin  40 mg PEG Tube Daily    ferrous sulfate  220 mg Oral Daily    metoprolol tartrate  25 mg PEG Tube BID    OLANZapine  5 mg PEG Tube Nightly    rivaroxaban  20 mg PEG Tube Daily with breakfast    nystatin  5 mL Mouth/Throat 4x Daily    docusate sodium  100 mg Per G Tube BID    divalproex  250 mg Oral TID    sodium chloride flush  5-40 mL IntraVENous 2 times per day    heparin flush  1 mL IntraVENous 2 times per day    ipratropium-albuterol  1 ampule Inhalation 4x daily    cefepime  2,000 mg IntraVENous Q12H     polyethylene glycol, 17 g, Daily PRN  acetaminophen, 650 mg, Q6H PRN  prochlorperazine, 10 mg, Q6H PRN  acetaminophen, 650 mg, Q6H PRN  melatonin, 5 mg, Nightly PRN  nitroGLYCERIN, 0.4 mg, Q5 Min PRN  sodium chloride flush, 5-40 mL, PRN  sodium chloride, , PRN  heparin flush, 1 mL, PRN  perflutren lipid microspheres, 1.5 mL, ONCE PRN       Objective:    BP (!) 142/97   Pulse 91   Temp 98.2 °F (36.8 °C) (Oral)   Resp 18   Ht 5' 11\" (1.803 m)   Wt 151 lb 6.4 oz (68.7 kg)   SpO2 95%   BMI 21.12 kg/m²   General Appearance: alert and oriented to person, place and time and in no acute distress  Skin: warm and dry  Head: normocephalic and atraumatic  Eyes: pupils equal, round, and reactive to light, extraocular eye movements intact, conjunctivae normal  Neck: neck supple and non tender without mass   Pulmonary/Chest: clear to auscultation bilaterally- no wheezes, rales or rhonchi, normal air movement, no respiratory distress  Cardiovascular: normal rate, normal S1 and S2 and no carotid bruits  Abdomen: soft, non-tender, non-distended, normal bowel sounds, no masses or organomegaly  Extremities: no cyanosis, no clubbing and no edema  Neurologic: no cranial nerve deficit and speech normal      Recent Labs     07/22/22 0446 07/23/22  0542 07/24/22  0640    144 141   K 4.4 4.4 4.3   * 112* 108*   CO2 24 24 28   BUN 39* 34* 28*   CREATININE 0.9 0.8 0.9   GLUCOSE 124* 163* 121*   CALCIUM 8.2* 8.2* 8.3*         Recent Labs     07/22/22 0446 07/23/22  0542 07/24/22  0640   ALKPHOS 52 68 75   PROT 5.6* 5.3* 5.6*   LABALBU 2.3* 2.4* 2.4*   BILITOT 0.3 0.2 0.3   AST 18 17 17   ALT 5 <5 5         Recent Labs     07/22/22 0446 07/23/22  0542 07/24/22  0640   WBC 10.9 10.8 8.2   RBC 2.79* 2.79* 2.93*   HGB 8.0* 7.8* 8.4*   HCT 26.5* 26.2* 27.8*   MCV 95.0 93.9 94.9   MCH 28.7 28.0 28.7   MCHC 30.2* 29.8* 30.2*   RDW 15.9* 15.7* 15.9*    164 182   MPV 12.8* 12.6* 12.6*             Radiology:   XR CHEST PORTABLE   Final Result   Persistent the bilateral infiltrates. See report CT chest July 20. CTA PULMONARY W CONTRAST   Final Result   No evidence of pulmonary embolism. Scattered ground-glass and consolidative opacities, greater on the right,   concerning for multifocal pneumonia. Clinical correlation recommended. XR CHEST PORTABLE   Final Result   Nodular density in the right parahilar region of 2.5 cm. Further evaluation   with CT chest recommended. Assessment:  Principal Problem:    Respiratory failure ([address]  Active Problems:    Severe protein-calorie malnutrition ([facility]  Resolved Problems:    * No resolved hospital problems.  *      Plan: History of present illness from History and Physical:  80 y.o. male  has a past medical history of respiratory failure, PEG, repeated urinary infection, lives at nursing home acquired hypothyroidism, Acute renal failure ([address] Arthritis, Asthma, History of cardiovascular stress test, and Hypertension. presented with respiratory failure. Chest x-ray may suggest potential lesion. Patient unable to tolerate a CAT scan. He has a chronic Shipman  appears alert and awake with no acute distress and is able to answer simple  questions. On direct questioning, patient denied any  resting ongoing chest pain, resting SOB, hemoptysis, productive cough, fever, ongoing palpitation, active abdominal pain, hematemesis, rectal bleeding, nelda, any other  and GI complaints and any new focal neuro deficits . Hemoglobin 10.3. WBC 11.7. Creatinine 1.1. BNP 2423. Lactate is 3.5    Septic shock due to b/l pna complicated by dehydration / hypovolemic shock & UTI: pulmon on.  7/23 moved from ICU to floor. S/p Solucortef x 5 doses. Brittni Jen been on Empiric abx with vanco and cefipime, 1st doses 7/21  But Ucx showing pseudomonas and staph aur showing sensitivity to these. Old left stroke with resulting right weakness  H/o htn: bp has been ok  Indwelling shipman with uti / pyelo: Ucx as above  Dementia by hx. Vascular component from #2? Tsh 6.5 in 11/21. No thyroid meds on home med list  Recent d/c from here 6/24  Dvt prophy on systemic anticoagulation with xarelto  Full code  Disposition back to the nursing home    NOTE: This report was transcribed using voice recognition software. Every effort was made to ensure accuracy; however, inadvertent computerized transcription errors may be present.      Electronically signed by Mj Miller MD on 7/24/2022 at 9:34 AM

## 2022-07-25 LAB
ALBUMIN SERPL-MCNC: 2.3 G/DL (ref 3.5–5.2)
ALP BLD-CCNC: 68 U/L (ref 40–129)
ALT SERPL-CCNC: <5 U/L (ref 0–40)
ANION GAP SERPL CALCULATED.3IONS-SCNC: 4 MMOL/L (ref 7–16)
AST SERPL-CCNC: 15 U/L (ref 0–39)
BILIRUB SERPL-MCNC: 0.3 MG/DL (ref 0–1.2)
BUN BLDV-MCNC: 24 MG/DL (ref 6–23)
CALCIUM SERPL-MCNC: 8.1 MG/DL (ref 8.6–10.2)
CHLORIDE BLD-SCNC: 107 MMOL/L (ref 98–107)
CO2: 29 MMOL/L (ref 22–29)
CREAT SERPL-MCNC: 0.8 MG/DL (ref 0.7–1.2)
GFR AFRICAN AMERICAN: >60
GFR NON-AFRICAN AMERICAN: >60 ML/MIN/1.73
GLUCOSE BLD-MCNC: 122 MG/DL (ref 74–99)
HCT VFR BLD CALC: 26 % (ref 37–54)
HEMOGLOBIN: 7.9 G/DL (ref 12.5–16.5)
MAGNESIUM: 2.1 MG/DL (ref 1.6–2.6)
MCH RBC QN AUTO: 28.2 PG (ref 26–35)
MCHC RBC AUTO-ENTMCNC: 30.4 % (ref 32–34.5)
MCV RBC AUTO: 92.9 FL (ref 80–99.9)
PDW BLD-RTO: 15.7 FL (ref 11.5–15)
PHOSPHORUS: 2.6 MG/DL (ref 2.5–4.5)
PLATELET # BLD: 180 E9/L (ref 130–450)
PMV BLD AUTO: 12 FL (ref 7–12)
POTASSIUM SERPL-SCNC: 4.1 MMOL/L (ref 3.5–5)
RBC # BLD: 2.8 E12/L (ref 3.8–5.8)
SARS-COV-2, NAAT: NOT DETECTED
SODIUM BLD-SCNC: 140 MMOL/L (ref 132–146)
TOTAL PROTEIN: 5.3 G/DL (ref 6.4–8.3)
WBC # BLD: 7.8 E9/L (ref 4.5–11.5)

## 2022-07-25 PROCEDURE — 2580000003 HC RX 258: Performed by: INTERNAL MEDICINE

## 2022-07-25 PROCEDURE — 99232 SBSQ HOSP IP/OBS MODERATE 35: CPT | Performed by: INTERNAL MEDICINE

## 2022-07-25 PROCEDURE — 94640 AIRWAY INHALATION TREATMENT: CPT

## 2022-07-25 PROCEDURE — 6370000000 HC RX 637 (ALT 250 FOR IP): Performed by: INTERNAL MEDICINE

## 2022-07-25 PROCEDURE — 87635 SARS-COV-2 COVID-19 AMP PRB: CPT

## 2022-07-25 PROCEDURE — 6360000002 HC RX W HCPCS: Performed by: INTERNAL MEDICINE

## 2022-07-25 PROCEDURE — 6360000002 HC RX W HCPCS: Performed by: NURSE PRACTITIONER

## 2022-07-25 PROCEDURE — 84100 ASSAY OF PHOSPHORUS: CPT

## 2022-07-25 PROCEDURE — 2580000003 HC RX 258: Performed by: NURSE PRACTITIONER

## 2022-07-25 PROCEDURE — 83735 ASSAY OF MAGNESIUM: CPT

## 2022-07-25 PROCEDURE — 80053 COMPREHEN METABOLIC PANEL: CPT

## 2022-07-25 PROCEDURE — 1200000000 HC SEMI PRIVATE

## 2022-07-25 PROCEDURE — 85027 COMPLETE CBC AUTOMATED: CPT

## 2022-07-25 PROCEDURE — 96365 THER/PROPH/DIAG IV INF INIT: CPT

## 2022-07-25 PROCEDURE — 36415 COLL VENOUS BLD VENIPUNCTURE: CPT

## 2022-07-25 PROCEDURE — 96366 THER/PROPH/DIAG IV INF ADDON: CPT

## 2022-07-25 RX ORDER — LEVOFLOXACIN 500 MG/1
500 TABLET, FILM COATED ORAL DAILY
Status: DISCONTINUED | OUTPATIENT
Start: 2022-07-25 | End: 2022-07-27 | Stop reason: HOSPADM

## 2022-07-25 RX ADMIN — NYSTATIN 500000 UNITS: 100000 SUSPENSION ORAL at 08:54

## 2022-07-25 RX ADMIN — OLANZAPINE 5 MG: 5 TABLET, FILM COATED ORAL at 21:02

## 2022-07-25 RX ADMIN — NYSTATIN 500000 UNITS: 100000 SUSPENSION ORAL at 12:47

## 2022-07-25 RX ADMIN — MUPIROCIN: 20 OINTMENT TOPICAL at 08:55

## 2022-07-25 RX ADMIN — DOCUSATE SODIUM LIQUID 100 MG: 100 LIQUID ORAL at 21:03

## 2022-07-25 RX ADMIN — METOPROLOL TARTRATE 25 MG: 25 TABLET, FILM COATED ORAL at 21:02

## 2022-07-25 RX ADMIN — NYSTATIN 500000 UNITS: 100000 SUSPENSION ORAL at 16:38

## 2022-07-25 RX ADMIN — CEFEPIME 2000 MG: 2 INJECTION, POWDER, FOR SOLUTION INTRAVENOUS at 09:03

## 2022-07-25 RX ADMIN — HEPARIN 100 UNITS: 100 SYRINGE at 09:01

## 2022-07-25 RX ADMIN — IPRATROPIUM BROMIDE AND ALBUTEROL SULFATE 1 AMPULE: .5; 2.5 SOLUTION RESPIRATORY (INHALATION) at 10:03

## 2022-07-25 RX ADMIN — MINERAL SUPPLEMENT IRON 300 MG / 5 ML STRENGTH LIQUID 100 PER BOX UNFLAVORED 220 MG: at 08:56

## 2022-07-25 RX ADMIN — DIVALPROEX SODIUM 250 MG: 125 CAPSULE, COATED PELLETS ORAL at 08:56

## 2022-07-25 RX ADMIN — LEVOFLOXACIN 500 MG: 500 TABLET, FILM COATED ORAL at 11:32

## 2022-07-25 RX ADMIN — ASPIRIN 81 MG CHEWABLE TABLET 81 MG: 81 TABLET CHEWABLE at 08:54

## 2022-07-25 RX ADMIN — IPRATROPIUM BROMIDE AND ALBUTEROL SULFATE 1 AMPULE: .5; 2.5 SOLUTION RESPIRATORY (INHALATION) at 14:23

## 2022-07-25 RX ADMIN — NYSTATIN 500000 UNITS: 100000 SUSPENSION ORAL at 21:01

## 2022-07-25 RX ADMIN — VANCOMYCIN HYDROCHLORIDE 1250 MG: 10 INJECTION, POWDER, LYOPHILIZED, FOR SOLUTION INTRAVENOUS at 16:39

## 2022-07-25 RX ADMIN — DOCUSATE SODIUM LIQUID 100 MG: 100 LIQUID ORAL at 08:56

## 2022-07-25 RX ADMIN — DIVALPROEX SODIUM 250 MG: 125 CAPSULE, COATED PELLETS ORAL at 15:11

## 2022-07-25 RX ADMIN — IPRATROPIUM BROMIDE AND ALBUTEROL SULFATE 1 AMPULE: .5; 2.5 SOLUTION RESPIRATORY (INHALATION) at 17:49

## 2022-07-25 RX ADMIN — AMLODIPINE BESYLATE 2.5 MG: 5 TABLET ORAL at 08:54

## 2022-07-25 RX ADMIN — DIVALPROEX SODIUM 250 MG: 125 CAPSULE, COATED PELLETS ORAL at 21:03

## 2022-07-25 RX ADMIN — Medication 10 ML: at 21:05

## 2022-07-25 RX ADMIN — HEPARIN 100 UNITS: 100 SYRINGE at 21:04

## 2022-07-25 RX ADMIN — METOPROLOL TARTRATE 25 MG: 25 TABLET, FILM COATED ORAL at 08:55

## 2022-07-25 RX ADMIN — MUPIROCIN: 20 OINTMENT TOPICAL at 21:03

## 2022-07-25 RX ADMIN — ATORVASTATIN CALCIUM 40 MG: 40 TABLET, FILM COATED ORAL at 08:55

## 2022-07-25 RX ADMIN — LANSOPRAZOLE 30 MG: KIT at 05:42

## 2022-07-25 RX ADMIN — RIVAROXABAN 20 MG: 20 TABLET, FILM COATED ORAL at 08:55

## 2022-07-25 RX ADMIN — IPRATROPIUM BROMIDE AND ALBUTEROL SULFATE 1 AMPULE: .5; 2.5 SOLUTION RESPIRATORY (INHALATION) at 07:01

## 2022-07-25 RX ADMIN — Medication 10 ML: at 08:57

## 2022-07-25 ASSESSMENT — PAIN SCALES - WONG BAKER
WONGBAKER_NUMERICALRESPONSE: 0
WONGBAKER_NUMERICALRESPONSE: 0

## 2022-07-25 ASSESSMENT — PAIN SCALES - GENERAL: PAINLEVEL_OUTOF10: 0

## 2022-07-25 NOTE — PROGRESS NOTES
Physician Progress Note      PATIENT:               Duncan Saunders  CSN #:                  [id number]  :                       1950  ADMIT DATE:       2022 6:39 PM  [address] [place] DATE:  RESPONDING  PROVIDER #:        EVAN GARCIA          QUERY TEXT:    Pt. admitted with Sepsis with PNA and UTI and per dietician evaluation on   22  pt meets criteria for severe protein calorie malnutrition. The medical record reflects the following:  Risk Factors: CVA, Dementia, Dysphagia  Clinical Indicators:  Per dietician evaluation : Severe malnutrition, In   context of chronic illness related to cognitive or neurological impairment as   evidenced by Criteria as identified in malnutrition assessment, moderate loss   of subcutaneous fat, weight loss, moderate muscle loss (24% x 9 months)  Treatment: TF enteral nutrition - peg tube, monitoring    Thank you,  Jerrica Thakur RN BSN [facility]  [phone]      ASPEN Criteria:    https://aspenjournals. onlinelibrary. woods. com/doi/full/10.1177/408059676313164  5  Options provided:  -- Severe Protein calorie malnutrition  -- Other - I will add my own diagnosis  -- Disagree - Not applicable / Not valid  -- Disagree - Clinically unable to determine / Unknown  -- Refer to Clinical Documentation Reviewer    PROVIDER RESPONSE TEXT:    This patient has severe protein calorie malnutrition.     Query created by: Marquis Reyes on 2022 11:10 AM      Electronically signed by:  EVAN Claudio 2022 12:15 PM

## 2022-07-25 NOTE — CARE COORDINATION
7/25/2022 DISCHARGE REVIEW: Return to [facility] rapid covid- orders obtained. NEED THE DAPTO SCRIPT so this can be faxed to [facility]- CM informed Dr. Cornell Camacho we need this. NO PRECERT, signed STEVAN needed. Keep midline in. PAS  time 430 pm- Ambulance sheet in soft blue chart. Nursing to call nurse to nurse. Electronically signed by LAURA West on 7/25/2022 at 11:29 AM    Addendum: discharge for today being held- due to medication evaluation needed per pcp. PAS informed no discharge today, Tatyana aware.  Electronically signed by LAURA West on 7/25/2022 at 1:20 PM

## 2022-07-25 NOTE — PROGRESS NOTES
Admitting Date and Time: 7/20/2022  6:39 PM  Admit Dx: Respiratory failure ([facility] [J96.90]  Septicemia ([facility] [A41.9]  Acute respiratory failure, unspecified whether with hypoxia or hypercapnia ([facility] [J96.00]  Urinary tract infection associated with indwelling urethral catheter, initial encounter ([place] [address] [N12.433D, N39.0]    Subjective:  Still pleasant and friendly with no complaints.   Per RN: alert and awake    ROS: denies fever, chills, cp, sob, n/v, HA unless stated above.     levoFLOXacin  500 mg Oral Daily    lansoprazole  30 mg Per G Tube QAM AC    vancomycin  1,250 mg IntraVENous Q24H    mupirocin   Topical BID    amLODIPine  2.5 mg PEG Tube Daily    aspirin  81 mg PEG Tube Daily    atorvastatin  40 mg PEG Tube Daily    ferrous sulfate  220 mg Oral Daily    metoprolol tartrate  25 mg PEG Tube BID    OLANZapine  5 mg PEG Tube Nightly    rivaroxaban  20 mg PEG Tube Daily with breakfast    nystatin  5 mL Mouth/Throat 4x Daily    docusate sodium  100 mg Per G Tube BID    divalproex  250 mg Oral TID    sodium chloride flush  5-40 mL IntraVENous 2 times per day    heparin flush  1 mL IntraVENous 2 times per day    ipratropium-albuterol  1 ampule Inhalation 4x daily     polyethylene glycol, 17 g, Daily PRN  acetaminophen, 650 mg, Q6H PRN  prochlorperazine, 10 mg, Q6H PRN  acetaminophen, 650 mg, Q6H PRN  melatonin, 5 mg, Nightly PRN  nitroGLYCERIN, 0.4 mg, Q5 Min PRN  sodium chloride flush, 5-40 mL, PRN  sodium chloride, , PRN  heparin flush, 1 mL, PRN  perflutren lipid microspheres, 1.5 mL, ONCE PRN       Objective:    /70   Pulse 86   Temp 97.7 °F (36.5 °C) (Oral)   Resp 20   Ht 5' 11\" (1.803 m)   Wt 151 lb 6.4 oz (68.7 kg)   SpO2 95%   BMI 21.12 kg/m²   General Appearance: alert and oriented to person, place and time and in no acute distress  Skin: warm and dry  Head: normocephalic and atraumatic  Eyes: pupils equal, round, and reactive to light, extraocular eye movements intact, conjunctivae y.o. male  has a past medical history of respiratory failure, PEG, repeated urinary infection, lives at nursing home acquired hypothyroidism, Acute renal failure ([address] Arthritis, Asthma, History of cardiovascular stress test, and Hypertension. presented with respiratory failure. Chest x-ray may suggest potential lesion. Patient unable to tolerate a CAT scan. He has a chronic Shipman  appears alert and awake with no acute distress and is able to answer simple  questions. On direct questioning, patient denied any  resting ongoing chest pain, resting SOB, hemoptysis, productive cough, fever, ongoing palpitation, active abdominal pain, hematemesis, rectal bleeding, nelda, any other  and GI complaints and any new focal neuro deficits . Hemoglobin 10.3. WBC 11.7. Creatinine 1.1. BNP 2423. Lactate is 3.5    Septic shock due to b/l pna complicated by dehydration / hypovolemic shock & UTI: pulmon on.  7/23 moved from ICU to floor. S/p Solucortef x 5 doses. Genetta Binning been on Empiric abx with vanco and cefipime, 1st doses 7/21  But Ucx showing pseudomonas and staph aur showing sensitivity to these. Changed cefipime for lavaquin to cover pseudomonas. Attempting to cover staph with dapto if permissible instead of vanco.  If not able he would benenfit from 2 more days of IV vanco  Update: dapto restricted. Will re-eval in am    Old left stroke with resulting right weakness  H/o htn: bp has been ok  Indwelling shipman with uti / pyelo: Ucx as above  Dementia by hx. Vascular component from #2? Tsh 6.5 in 11/21. No thyroid meds on home med list  Recent d/c from here 6/24  Dvt prophy on systemic anticoagulation with xarelto  Full code  Disposition back to the nursing home. Pt ot already on    NOTE: This report was transcribed using voice recognition software. Every effort was made to ensure accuracy; however, inadvertent computerized transcription errors may be present.      Electronically signed by Bin Gamboa MD on 7/25/2022 at 12:15 PM

## 2022-07-25 NOTE — PROGRESS NOTES
Pharmacy Consultation Note  (Antibiotic Dosing and Monitoring)    Initial consult date: 7/21/22  Consulting physician/provider: Dr Agustin Lewis  Drug: Vancomycin  Indication: Bacteremia/UTI/Pneumonia    Age/  Gender Height Weight IBW  Allergy Information   71 y.o./male 5' 11\" (180.3 cm) 150 lb (68 kg)     Ideal body weight: 75.3 kg (166 lb 0.1 oz)   Elemental sulfur and Penicillins      Renal Function:  Recent Labs     07/23/22  0542 07/24/22  0640 07/25/22  0551   BUN 34* 28* 24*   CREATININE 0.8 0.9 0.8       Intake/Output Summary (Last 24 hours) at 7/25/2022 1542  Last data filed at 7/25/2022 1254  Gross per 24 hour   Intake 4388 ml   Output 1775 ml   Net 2613 ml       Vancomycin Monitoring:  Trough:    No results for input(s): VANCOTROUGH in the last 72 hours. Random:  No results for input(s): VANCORANDOM in the last 72 hours. Vancomycin Administration Times:  Recent vancomycin administrations                     vancomycin (VANCOCIN) 1,250 mg in dextrose 5 % 250 mL IVPB (mg) 1,250 mg New Bag 07/23/22 7366                    Assessment:  Patient is a 80 y.o. male who has been initiated on vancomycin  Estimated Creatinine Clearance: 83 mL/min (based on SCr of 0.8 mg/dL). To dose vancomycin, pharmacy will be utilizing [employer] calculation software for goal AUC/JANKI 400-600 mg/L-hr  7/22: Trough @ 1141 = 20.8 mcg/mL;   7/23: Morning dose held due to trough level from Friday?  Doses re-timed to continue therapy with today's dose    Plan:  Vancomycin 1250 mg IV every 24 hours  Levels when appropriate  Will continue to monitor renal function   Clinical pharmacy to follow      Elisabet Herrera PharmD, BCPS 7/25/2022 3:43 PM   [phone]

## 2022-07-25 NOTE — PROGRESS NOTES
Pulmonary/Critical Care Progress Note    We are following patient for prior septic shock, pneumonia, MRSA bacteremia, Pseudomonas bacteremia, UTI with Pseudomonas, history of prior stroke, diabetes mellitus type 2, acute hypoxemic respiratory failure, resolved    SUBJECTIVE:  The patient continues to tolerate oral levofloxacin and IV vancomycin without difficulty. He is oxygenating well and only small amounts of supplementary oxygen. He is a candidate for discharge to a nursing home later today. Phosphorus was replaced yesterday and significant improvement is seen. MEDICATIONS:   levoFLOXacin  500 mg Oral Daily    lansoprazole  30 mg Per G Tube QAM AC    vancomycin  1,250 mg IntraVENous Q24H    mupirocin   Topical BID    amLODIPine  2.5 mg PEG Tube Daily    aspirin  81 mg PEG Tube Daily    atorvastatin  40 mg PEG Tube Daily    ferrous sulfate  220 mg Oral Daily    metoprolol tartrate  25 mg PEG Tube BID    OLANZapine  5 mg PEG Tube Nightly    rivaroxaban  20 mg PEG Tube Daily with breakfast    nystatin  5 mL Mouth/Throat 4x Daily    docusate sodium  100 mg Per G Tube BID    divalproex  250 mg Oral TID    sodium chloride flush  5-40 mL IntraVENous 2 times per day    heparin flush  1 mL IntraVENous 2 times per day    ipratropium-albuterol  1 ampule Inhalation 4x daily      sodium chloride       polyethylene glycol, [DISCONTINUED] acetaminophen **OR** acetaminophen, prochlorperazine, acetaminophen, melatonin, nitroGLYCERIN, sodium chloride flush, sodium chloride, heparin flush, perflutren lipid microspheres      REVIEW OF SYSTEMS:  Constitutional: Denies fever, weight loss, night sweats, and fatigue  Skin: Denies pigmentation, dark lesions, and rashes   HEENT: Denies hearing loss, tinnitus, ear drainage, epistaxis, sore throat, and hoarseness. Cardiovascular: Denies palpitations, chest pain, and chest pressure. Respiratory: Denies cough, dyspnea at rest, hemoptysis, apnea, and choking.   Gastrointestinal: Denies 130 - 450 E9/L Final   07/24/2022 182 130 - 450 E9/L Final   07/23/2022 164 130 - 450 E9/L Final     Sodium   Date Value Ref Range Status   07/25/2022 140 132 - 146 mmol/L Final   07/24/2022 141 132 - 146 mmol/L Final   07/23/2022 144 132 - 146 mmol/L Final     Potassium   Date Value Ref Range Status   07/25/2022 4.1 3.5 - 5.0 mmol/L Final   07/24/2022 4.3 3.5 - 5.0 mmol/L Final   07/23/2022 4.4 3.5 - 5.0 mmol/L Final     Potassium reflex Magnesium   Date Value Ref Range Status   07/20/2022 4.5 3.5 - 5.0 mmol/L Final   06/23/2022 4.3 3.5 - 5.0 mmol/L Final   06/21/2022 5.3 (H) 3.5 - 5.0 mmol/L Final     Chloride   Date Value Ref Range Status   07/25/2022 107 98 - 107 mmol/L Final   07/24/2022 108 (H) 98 - 107 mmol/L Final   07/23/2022 112 (H) 98 - 107 mmol/L Final     CO2   Date Value Ref Range Status   07/25/2022 29 22 - 29 mmol/L Final   07/24/2022 28 22 - 29 mmol/L Final   07/23/2022 24 22 - 29 mmol/L Final     BUN   Date Value Ref Range Status   07/25/2022 24 (H) 6 - 23 mg/dL Final   07/24/2022 28 (H) 6 - 23 mg/dL Final   07/23/2022 34 (H) 6 - 23 mg/dL Final     Creatinine   Date Value Ref Range Status   07/25/2022 0.8 0.7 - 1.2 mg/dL Final   07/24/2022 0.9 0.7 - 1.2 mg/dL Final   07/23/2022 0.8 0.7 - 1.2 mg/dL Final     Glucose   Date Value Ref Range Status   07/25/2022 122 (H) 74 - 99 mg/dL Final   07/24/2022 121 (H) 74 - 99 mg/dL Final   07/23/2022 163 (H) 74 - 99 mg/dL Final     Calcium   Date Value Ref Range Status   07/25/2022 8.1 (L) 8.6 - 10.2 mg/dL Final   07/24/2022 8.3 (L) 8.6 - 10.2 mg/dL Final   07/23/2022 8.2 (L) 8.6 - 10.2 mg/dL Final     Total Protein   Date Value Ref Range Status   07/25/2022 5.3 (L) 6.4 - 8.3 g/dL Final   07/24/2022 5.6 (L) 6.4 - 8.3 g/dL Final   07/23/2022 5.3 (L) 6.4 - 8.3 g/dL Final     Albumin   Date Value Ref Range Status   07/25/2022 2.3 (L) 3.5 - 5.2 g/dL Final   07/24/2022 2.4 (L) 3.5 - 5.2 g/dL Final   07/23/2022 2.4 (L) 3.5 - 5.2 g/dL Final     Total Bilirubin   Date Value Ref Range Status   07/25/2022 0.3 0.0 - 1.2 mg/dL Final   07/24/2022 0.3 0.0 - 1.2 mg/dL Final   07/23/2022 0.2 0.0 - 1.2 mg/dL Final     Alkaline Phosphatase   Date Value Ref Range Status   07/25/2022 68 40 - 129 U/L Final   07/24/2022 75 40 - 129 U/L Final   07/23/2022 68 40 - 129 U/L Final     AST   Date Value Ref Range Status   07/25/2022 15 0 - 39 U/L Final   07/24/2022 17 0 - 39 U/L Final   07/23/2022 17 0 - 39 U/L Final     ALT   Date Value Ref Range Status   07/25/2022 <5 0 - 40 U/L Final   07/24/2022 5 0 - 40 U/L Final   07/23/2022 <5 0 - 40 U/L Final     GFR Non-   Date Value Ref Range Status   07/25/2022 >60 >=60 mL/min/1.73 Final     Comment:     Chronic Kidney Disease: less than 60 ml/min/1.73 sq.m. Kidney Failure: less than 15 ml/min/1.73 sq.m. Results valid for patients 18 years and older. 07/24/2022 >60 >=60 mL/min/1.73 Final     Comment:     Chronic Kidney Disease: less than 60 ml/min/1.73 sq.m. Kidney Failure: less than 15 ml/min/1.73 sq.m. Results valid for patients 18 years and older. 07/23/2022 >60 >=60 mL/min/1.73 Final     Comment:     Chronic Kidney Disease: less than 60 ml/min/1.73 sq.m. Kidney Failure: less than 15 ml/min/1.73 sq.m. Results valid for patients 18 years and older. GFR    Date Value Ref Range Status   07/25/2022 >60  Final   07/24/2022 >60  Final   07/23/2022 >60  Final     Magnesium   Date Value Ref Range Status   07/25/2022 2.1 1.6 - 2.6 mg/dL Final   07/24/2022 2.1 1.6 - 2.6 mg/dL Final   07/23/2022 2.2 1.6 - 2.6 mg/dL Final     Phosphorus   Date Value Ref Range Status   07/25/2022 2.6 2.5 - 4.5 mg/dL Final   07/24/2022 1.3 (L) 2.5 - 4.5 mg/dL Final   07/23/2022 1.6 (L) 2.5 - 4.5 mg/dL Final     No results for input(s): PH, PO2, PCO2, HCO3, BE, O2SAT in the last 72 hours. RADIOLOGY:  XR CHEST PORTABLE   Final Result   Persistent the bilateral infiltrates. See report CT chest July 20. CTA PULMONARY W CONTRAST   Final Result   No evidence of pulmonary embolism. Scattered ground-glass and consolidative opacities, greater on the right,   concerning for multifocal pneumonia. Clinical correlation recommended. XR CHEST PORTABLE   Final Result   Nodular density in the right parahilar region of 2.5 cm. Further evaluation   with CT chest recommended. PROBLEM LIST:  Principal Problem:    Respiratory failure ([address]  Active Problems:    Severe protein-calorie malnutrition ([address]  Resolved Problems:    * No resolved hospital problems. *      IMPRESSION:  Hypophosphatemia, resolved  Prior pneumonia, improved pyelonephritis secondary to Pseudomonas  MRSA and Pseudomonas bacteremia  Previous septic shock  History of stroke with aphasia and right hemiparesis    PLAN:  Patient may be discharged from pulmonary standpoint  Antibiotics may be continued for several days beyond discharge  We will sign off at this time. Please not hesitate to call should any difficulties arise.       Electronically signed by Ethel Lawrence MD on 7/25/2022 at 3:37 PM

## 2022-07-26 LAB
ALBUMIN SERPL-MCNC: 2.3 G/DL (ref 3.5–5.2)
ALP BLD-CCNC: 72 U/L (ref 40–129)
ALT SERPL-CCNC: <5 U/L (ref 0–40)
ANION GAP SERPL CALCULATED.3IONS-SCNC: 4 MMOL/L (ref 7–16)
AST SERPL-CCNC: 14 U/L (ref 0–39)
BILIRUB SERPL-MCNC: 0.3 MG/DL (ref 0–1.2)
BLOOD CULTURE, ROUTINE: NORMAL
BUN BLDV-MCNC: 23 MG/DL (ref 6–23)
CALCIUM SERPL-MCNC: 8.2 MG/DL (ref 8.6–10.2)
CHLORIDE BLD-SCNC: 108 MMOL/L (ref 98–107)
CO2: 30 MMOL/L (ref 22–29)
CREAT SERPL-MCNC: 0.9 MG/DL (ref 0.7–1.2)
CULTURE, BLOOD 2: NORMAL
GFR AFRICAN AMERICAN: >60
GFR NON-AFRICAN AMERICAN: >60 ML/MIN/1.73
GLUCOSE BLD-MCNC: 116 MG/DL (ref 74–99)
HCT VFR BLD CALC: 26.9 % (ref 37–54)
HEMOGLOBIN: 8.2 G/DL (ref 12.5–16.5)
MAGNESIUM: 2.2 MG/DL (ref 1.6–2.6)
MCH RBC QN AUTO: 28 PG (ref 26–35)
MCHC RBC AUTO-ENTMCNC: 30.5 % (ref 32–34.5)
MCV RBC AUTO: 91.8 FL (ref 80–99.9)
PDW BLD-RTO: 15.7 FL (ref 11.5–15)
PHOSPHORUS: 2.6 MG/DL (ref 2.5–4.5)
PLATELET # BLD: 204 E9/L (ref 130–450)
PMV BLD AUTO: 11.5 FL (ref 7–12)
POTASSIUM SERPL-SCNC: 4.5 MMOL/L (ref 3.5–5)
RBC # BLD: 2.93 E12/L (ref 3.8–5.8)
SODIUM BLD-SCNC: 142 MMOL/L (ref 132–146)
TOTAL PROTEIN: 5.5 G/DL (ref 6.4–8.3)
WBC # BLD: 10 E9/L (ref 4.5–11.5)

## 2022-07-26 PROCEDURE — 80053 COMPREHEN METABOLIC PANEL: CPT

## 2022-07-26 PROCEDURE — 6360000002 HC RX W HCPCS: Performed by: INTERNAL MEDICINE

## 2022-07-26 PROCEDURE — 84100 ASSAY OF PHOSPHORUS: CPT

## 2022-07-26 PROCEDURE — 36415 COLL VENOUS BLD VENIPUNCTURE: CPT

## 2022-07-26 PROCEDURE — 97535 SELF CARE MNGMENT TRAINING: CPT

## 2022-07-26 PROCEDURE — 94640 AIRWAY INHALATION TREATMENT: CPT

## 2022-07-26 PROCEDURE — 2580000003 HC RX 258: Performed by: INTERNAL MEDICINE

## 2022-07-26 PROCEDURE — 97110 THERAPEUTIC EXERCISES: CPT

## 2022-07-26 PROCEDURE — 96366 THER/PROPH/DIAG IV INF ADDON: CPT

## 2022-07-26 PROCEDURE — 83735 ASSAY OF MAGNESIUM: CPT

## 2022-07-26 PROCEDURE — 6370000000 HC RX 637 (ALT 250 FOR IP): Performed by: INTERNAL MEDICINE

## 2022-07-26 PROCEDURE — 99232 SBSQ HOSP IP/OBS MODERATE 35: CPT | Performed by: INTERNAL MEDICINE

## 2022-07-26 PROCEDURE — 85027 COMPLETE CBC AUTOMATED: CPT

## 2022-07-26 PROCEDURE — 1200000000 HC SEMI PRIVATE

## 2022-07-26 RX ADMIN — RIVAROXABAN 20 MG: 20 TABLET, FILM COATED ORAL at 09:29

## 2022-07-26 RX ADMIN — NYSTATIN 500000 UNITS: 100000 SUSPENSION ORAL at 17:34

## 2022-07-26 RX ADMIN — IPRATROPIUM BROMIDE AND ALBUTEROL SULFATE 1 AMPULE: .5; 2.5 SOLUTION RESPIRATORY (INHALATION) at 10:02

## 2022-07-26 RX ADMIN — HEPARIN 100 UNITS: 100 SYRINGE at 09:30

## 2022-07-26 RX ADMIN — IPRATROPIUM BROMIDE AND ALBUTEROL SULFATE 1 AMPULE: .5; 2.5 SOLUTION RESPIRATORY (INHALATION) at 15:45

## 2022-07-26 RX ADMIN — LEVOFLOXACIN 500 MG: 500 TABLET, FILM COATED ORAL at 09:29

## 2022-07-26 RX ADMIN — AMLODIPINE BESYLATE 2.5 MG: 5 TABLET ORAL at 09:29

## 2022-07-26 RX ADMIN — MUPIROCIN: 20 OINTMENT TOPICAL at 09:39

## 2022-07-26 RX ADMIN — IPRATROPIUM BROMIDE AND ALBUTEROL SULFATE 1 AMPULE: .5; 2.5 SOLUTION RESPIRATORY (INHALATION) at 19:03

## 2022-07-26 RX ADMIN — DIVALPROEX SODIUM 250 MG: 125 CAPSULE, COATED PELLETS ORAL at 22:50

## 2022-07-26 RX ADMIN — MUPIROCIN: 20 OINTMENT TOPICAL at 21:00

## 2022-07-26 RX ADMIN — NYSTATIN 500000 UNITS: 100000 SUSPENSION ORAL at 13:59

## 2022-07-26 RX ADMIN — VANCOMYCIN HYDROCHLORIDE 1250 MG: 10 INJECTION, POWDER, LYOPHILIZED, FOR SOLUTION INTRAVENOUS at 17:34

## 2022-07-26 RX ADMIN — IPRATROPIUM BROMIDE AND ALBUTEROL SULFATE 1 AMPULE: .5; 2.5 SOLUTION RESPIRATORY (INHALATION) at 05:49

## 2022-07-26 RX ADMIN — ASPIRIN 81 MG CHEWABLE TABLET 81 MG: 81 TABLET CHEWABLE at 09:28

## 2022-07-26 RX ADMIN — OLANZAPINE 5 MG: 5 TABLET, FILM COATED ORAL at 22:50

## 2022-07-26 RX ADMIN — NYSTATIN 500000 UNITS: 100000 SUSPENSION ORAL at 21:00

## 2022-07-26 RX ADMIN — NYSTATIN 500000 UNITS: 100000 SUSPENSION ORAL at 09:29

## 2022-07-26 RX ADMIN — DIVALPROEX SODIUM 250 MG: 125 CAPSULE, COATED PELLETS ORAL at 13:59

## 2022-07-26 RX ADMIN — ATORVASTATIN CALCIUM 40 MG: 40 TABLET, FILM COATED ORAL at 09:29

## 2022-07-26 RX ADMIN — DIVALPROEX SODIUM 250 MG: 125 CAPSULE, COATED PELLETS ORAL at 09:29

## 2022-07-26 RX ADMIN — DOCUSATE SODIUM LIQUID 100 MG: 100 LIQUID ORAL at 22:50

## 2022-07-26 RX ADMIN — METOPROLOL TARTRATE 25 MG: 25 TABLET, FILM COATED ORAL at 20:50

## 2022-07-26 RX ADMIN — Medication 10 ML: at 21:00

## 2022-07-26 RX ADMIN — MINERAL SUPPLEMENT IRON 300 MG / 5 ML STRENGTH LIQUID 100 PER BOX UNFLAVORED 220 MG: at 09:28

## 2022-07-26 RX ADMIN — METOPROLOL TARTRATE 25 MG: 25 TABLET, FILM COATED ORAL at 09:29

## 2022-07-26 RX ADMIN — Medication 10 ML: at 09:39

## 2022-07-26 RX ADMIN — HEPARIN 100 UNITS: 100 SYRINGE at 22:49

## 2022-07-26 ASSESSMENT — PAIN SCALES - GENERAL: PAINLEVEL_OUTOF10: 0

## 2022-07-26 ASSESSMENT — PAIN SCALES - WONG BAKER: WONGBAKER_NUMERICALRESPONSE: 0

## 2022-07-26 NOTE — PROGRESS NOTES
Provides: at goal    Anthropometric Measures:  Height: 5' 11\" (180.3 cm)  Ideal Body Weight (IBW): 172 lbs (78 kg)    Admission Body Weight: 151 lb 6.4 oz (68.7 kg) (7/21 bed scale)  Current Body Weight: 151 lb 6.4 oz (68.7 kg) (7/21 bed scale), 88 % IBW. Current BMI (kg/m2): 21.1  Usual Body Weight: 200 lb (90.7 kg) (10/2021 - estimated)  % Weight Change (Calculated): -24.3  Weight Adjustment For: No Adjustment  BMI Categories: Normal Weight (BMI 22.0 to 24.9) age over 72    Estimated Daily Nutrient Needs:  Energy Requirements Based On: Formula  Weight Used for Energy Requirements: Current  Energy (kcal/day): 0826-3870  Weight Used for Protein Requirements: Current  Protein (g/day): 80-90  Fluid (ml/day): per critical care    Nutrition Diagnosis:   Severe malnutrition, In context of chronic illness related to cognitive or neurological impairment as evidenced by Criteria as identified in malnutrition assessment, moderate loss of subcutaneous fat, weight loss, moderate muscle loss (24% x 9 months)    Nutrition Interventions:   Food and/or Nutrient Delivery: Continue NPO, Modify Tube Feeding (Modify goal rate 60ml x 22 hours)  Nutrition Education/Counseling: No recommendation at this time  Coordination of Nutrition Care: Continue to monitor while inpatient    Goals:  Goals:  Tolerate nutrition support at goal rate    Nutrition Monitoring and Evaluation:   Behavioral-Environmental Outcomes: None Identified  Food/Nutrient Intake Outcomes: Enteral Nutrition Intake/Tolerance  Physical Signs/Symptoms Outcomes: Biochemical Data, Nutrition Focused Physical Findings, Skin, Weight, Chewing or Swallowing, GI Status, Fluid Status or Edema, Hemodynamic Status    Discharge Planning:    Enteral Nutrition     Jaqui Jenkins, MS, RD, LD  Contact: [phone]

## 2022-07-26 NOTE — PROGRESS NOTES
Physical Therapy Treatment Note/Plan of Care    Room #:  5617/9256-98  Patient Name: Charlene Mcelroy  YOB: 1950  MRN: [id number]    Date of Service: 7/26/2022     Tentative placement recommendation: [facility] Sain[facility]   Equipment recommendation: Patient has needed equipment       Evaluating Physical Therapist: Nydia Becerra, PT Lic. #[id number]      Specific Provider Orders/Date/Referring Provider :  07/22/22 [facility]5    PT eval and treat  Start:  07/22/22 1445,   End:  07/22/22 1445,   ONE TIME,   Standing Count:  1 Occurrences,   Tate Cordova MD     Admitting Diagnosis:   Respiratory failure ([facility] [J96.90]  Septicemia ([facility] [A41.9]  Acute respiratory failure, unspecified whether with hypoxia or hypercapnia ([address] [J96.00]  Urinary tract infection associated with indwelling urethral catheter, initial encounter ([facility] [D34.584G, N39.0]       Surgery: none  Visit Diagnoses         Codes    Septicemia ([address]    -  Primary A41.9    Urinary tract infection associated with indwelling urethral catheter, initial encounter ([facility]     T83.511A, N39.0            Patient Active Problem List   Diagnosis    Inguinal hernia    Acute renal failure ([address]    Acute cholecystitis    Acute blood loss anemia    Acquired hypothyroidism    Gross hematuria    Hypertension    History of stroke    Respiratory failure ([address]    Severe protein-calorie malnutrition ([address]        ASSESSMENT of Current Deficits Patient exhibits decreased strength, balance, and endurance impairing functional mobility, transfers, gait , gait distance, and tolerance to activity Patient performed supine exercises AAROM to bilateral LE with decreased knee flexion noted. Patient positioned on side for skin integrity.          PHYSICAL THERAPY  PLAN OF CARE       Physical therapy plan of care is established based on physician order,  patient diagnosis and clinical assessment    Current Treatment Recommendations:    -Bed Mobility: Lower extremity exercises , of  2    Sit to supine: Dependent of  2    Scooting: Dependent of  2   Rolling: Maximal assist of 1   Supine to sit: Not assessed    Sit to supine: Not assessed    Scooting: Not assessed     Rolling: Modified Independent    Supine to sit: Maximal assist of 1    Sit to supine: Maximal assist of 1    Scooting: Maximal assist of 1     Transfers Sit to stand: Not assessed     Sit to stand: Not assessed      Ambulation    not assessed      not assessed    Stair negotiation: ascended and descended   Not assessed     Not assessed     ROM Within functional limits    Increase range of motion 10% of affected joints    Strength BUE:  refer to OT eval  RLE:  2+/5  LLE:  2+/5  Increase strength in affected mm groups by 1/3 grade   Balance Sitting EOB:  not assessed    Dynamic Standing:  not assessed    Sitting EOB:  not assessed    Dynamic Standing: not assessed       Patient is Alert & Oriented x person, place, time, and situation and follows directions    Sensation:  Patient  denies numbness/tingling   Edema:  no   Endurance: poor           Treatment:  Patient practiced and was instructed/facilitated in the following treatment: Patient assisted with supine exercises in bed and assisted to L side lying with wedges placed under R side. Foam heel floaters donned at end of session. Therapeutic Exercises:  ankle pumps, quad sets, heel slide, hip abduction/adduction, and straight leg raise  x 10 reps. At end of session, patient in bed with alarm call light and phone within reach,  all lines and tubes intact, nursing notified. Patient would benefit from continued skilled Physical Therapy to improve functional independence and quality of life.          Patient's/ family goals   none stated    Time in  156  Time out  210    Total Treatment Time  14 minutes      CPT codes:  Therapeutic exercises (40424)   14 minutes  1 unit(s)    Dejah Flor PTA   #[id number]

## 2022-07-26 NOTE — PROGRESS NOTES
OCCUPATIONAL THERAPY TREATMENT NOTE     [facility]  [facility] [place]         Date:2022  Patient Name: Georgie Rosales  MRN: [id number]  : 1950  Room: [address] [place] [address]          Evaluating OT: Jaya Denson OTR/L #[id number]      Referring Provider and Specific Provider Orders/Date:      22   OT eval and treat  Start:  22,   End:  22,   ONE TIME,   Standing Count:  1 Occurrences,   Akshat Sevilla MD       Placement Recommendation: Skilled Nursing Faciltiy         Diagnosis:   1. Septicemia ([facility]   2. Urinary tract infection associated with indwelling urethral catheter, initial encounter ([facility]   3. Acute respiratory failure, unspecified whether with hypoxia or hypercapnia [facility])        Surgery: None       Pertinent Medical History:       Past Medical History        Past Medical History:   Diagnosis Date    Acquired hypothyroidism 2016    Acute renal failure ([facility]      Arthritis      Asthma      History of cardiovascular stress test 2016     lexiscan    Hypertension      Stroke (cerebrum) [facility])              Past Surgical History         Past Surgical History:   Procedure Laterality Date    CHOLECYSTECTOMY   2015    HERNIA REPAIR        Dr Nithya Thomas              Precautions:  Fall Risk, falls and alarm, strict bedrest, PEG tube, history of CVA, non-ambulatory.        Assessment of current deficits   [x] Functional mobility            [x]ADLs           [x] Strength                   [x]Cognition   [x] Functional transfers          [x] IADLs          [x] Safety Awareness   [x]Endurance   [x] Fine Coordination              [x] Balance      [] Vision/perception    []Sensation      []Gross Motor Coordination  [] ROM           [] Delirium                   [x] Motor Control     OT PLAN OF CARE   OT POC based on physician orders, patient diagnosis and results of clinical assessment Frequency/Duration 1-3 days/wk for 2 weeks PRN     Specific OT Treatment Interventions to include:   * Instruction/training on adapted ADL techniques and AE recommendations to increase functional independence within precautions       * Training on energy conservation strategies, correct breathing pattern and techniques to improve independence/tolerance for self-care routine  * Functional transfer/mobility training/DME recommendations for increased independence, safety, and fall prevention  * Patient/Family education to increase follow through with safety techniques and functional independence  * Recommendation of environmental modifications for increased safety with functional transfers/mobility and ADLs  * Cognitive retraining/development of therapeutic activities to improve problem solving, judgement, memory, and attention for increased safety/participation in ADL/IADL tasks  * Therapeutic exercise to improve motor endurance, ROM, and functional strength for ADLs/functional transfers  * Therapeutic activities to facilitate/challenge dynamic balance, stand tolerance for increased safety and independence with ADLs  * Therapeutic activities to facilitate gross/fine motor skills for increased independence with ADLs  * Positioning to improve skin integrity, interaction with environment and functional independence     Recommended Adaptive Equipment: TBD      Home Living: Pt resides at [address] [place] [address] owned: SNF equipment      Prior Level of Function: Requires assist with ADLs , dependent with IADLs; joi lift/non-ambulatory. Pain Level: Pt denied pain. Cognition: A&O: 3/4 - however confusion present;  Follows 1-2 step directions              Memory: impaired               Sequencing: impaired              Problem solving: impaired               Judgement/safety: impaired      [facility]   AM-PAC Daily Activity Inpatient  How much help for putting on and taking off regular lower body clothing?: Total  How much help for Bathing?: A Lot  How much help for Toileting?: Total  How much help for putting on and taking off regular upper body clothing?: A Lot  How much help for taking care of personal grooming?: A Lot  How much help for eating meals?: Total  AM-PAC Inpatient Daily Activity Raw Score: 9  AM-PAC Inpatient ADL T-Scale Score : 25.33  ADL Inpatient CMS 0-100% Score: 79.59  ADL Inpatient CMS G-Code Modifier : CL                Functional Assessment:    Initial Eval Status  Date: 7/24/22    Treatment Status  Date: 7/26/22 STGs = LTGs  Time frame: 10-14 days   Feeding Dependent   PEG tube     Dep  N/A     Grooming Maximal Assist    MIN A to wash face, apply lotion, applying lip balm. MOD A over for pt to complete oral hygiene, combing hair with limited  strength, and shoulder ROM/UE strength. Moderate Assist    UB Dressing Maximal Assist    N/t  Moderate Assist    LB Dressing Dependent    Dep  Maximal Assist    Bathing Maximal Assist / Dependent  Assist x 2 for rolling to reach back-side     N/t  Maximal Assist x 1    Toileting Dependent   For mgmt of shipman catheter and rear hygiene     Dep  Maximal Assist    Bed Mobility Supine to sit:  Not Assessed   Sit to supine: Not Assessed  Rolling: Maximal Assist x 2 for placement of chux  Scooting: Dependent with use of chux     N/t Supine to sit: Maximal Assist x 2   Sit to supine: Maximal Assist x 2  Rolling:  Moderate Assist x 2    Balance Sitting:    Static: poor trunk support with right lean in semi-supine     Dynamic: poor    N/t  Sitting:    Static: fair     Dynamic: fair-   Activity Tolerance fair  minus  Fair-  Pt will engaged in func act >25 mins for carry over into client centered tasks, interaction with environment and indep in ADLs         Visual/  Perceptual Glasses: Yes     Reports changes in vision since admission: N/a       NA     Pt at bed level engaged in B UE ROM ex's 2 x 10 reps in all planes focusing on UE AROM, strength/endurance to increase independence with ADL tasks; Comments: Upon arrival pt supine in bed, agreeable to therapy session. Pt educated with regards to grooming tasks, importance of increased activity with self care, B UE ROM ex's, joint protection. At end of session pt supine in bed,  all lines and tubes intact, call light within reach. Pt has made fair- progress towards set goals.    Continue with current plan of care      Treatment Time In:1550            Treatment Time Out: 1553                Treatment Charges: Mins Units   Ther Ex  32924 15 1   Manual Therapy 56665     Thera Activities 33404     ADL/Home Mgt 68040 10 1   Neuro Re-ed 53847     Group Therapy      Orthotic manage/training  13651     Non-Billable Time     Total Timed Treatment [address] SELLERS/L [address]

## 2022-07-26 NOTE — PROGRESS NOTES
Pharmacy Consultation Note  (Antibiotic Dosing and Monitoring)    Initial consult date: 7/21/22  Consulting physician/provider: Dr Bruna Abdi  Drug: Vancomycin  Indication: Bacteremia/UTI/Pneumonia    Age/  Gender Height Weight IBW  Allergy Information   71 y.o./male 5' 11\" (180.3 cm) 150 lb (68 kg)     Ideal body weight: 75.3 kg (166 lb 0.1 oz)   Elemental sulfur and Penicillins      Renal Function:  Recent Labs     07/24/22  0640 07/25/22  0551 07/26/22  0627   BUN 28* 24* 23   CREATININE 0.9 0.8 0.9       Intake/Output Summary (Last 24 hours) at 7/26/2022 1512  Last data filed at 7/26/2022 1416  Gross per 24 hour   Intake 2388.04 ml   Output 6100 ml   Net -3711.96 ml       Vancomycin Monitoring:  Trough:    No results for input(s): VANCOTROUGH in the last 72 hours. Random:  No results for input(s): VANCORANDOM in the last 72 hours. Vancomycin Administration Times:  Recent vancomycin administrations                     vancomycin (VANCOCIN) 1,250 mg in dextrose 5 % 250 mL IVPB (mg) 1,250 mg New Bag 07/25/22 1639                  Assessment:  Patient is a 80 y.o. male who has been initiated on vancomycin  Estimated Creatinine Clearance: 74 mL/min (based on SCr of 0.9 mg/dL). To dose vancomycin, pharmacy will be utilizing [employer] calculation software for goal AUC/JANKI 400-600 mg/L-hr  7/22: Trough @ 1141 = 20.8 mcg/mL;   7/23: Morning dose held due to trough level from Friday?  Doses re-timed to continue therapy with today's dose    Plan:  Vancomycin 1250 mg IV every 24 hours  Levels when appropriate  Will continue to monitor renal function   Clinical pharmacy to follow      Ayan TaborD, BCPS 7/26/2022 3:12 PM   [phone]

## 2022-07-26 NOTE — CARE COORDINATION
7/26/2022 PLANNED DISCHARGE on 7/27/2022- after another vanco dose tomorrow. [facility] rapid covid completed on 7/25/2022- no further covid testing needed unless symptoms per Tatyana at [facility]. NO PRECERT, signed STEVAN needed. Ambulance sheet in soft blue chart. Met with pt and his sister that he has only spoken by phone for the last 3 years. A very happy reunion for all.  Electronically signed by Sam Barroso RN-BC on 7/26/2022 at 11:56 AM

## 2022-07-26 NOTE — PROGRESS NOTES
movements intact, conjunctivae normal  Neck: neck supple and non tender without mass   Pulmonary/Chest: clear to auscultation bilaterally- no wheezes, rales or rhonchi, normal air movement, no respiratory distress  Cardiovascular: normal rate, normal S1 and S2 and no carotid bruits  Abdomen: soft, non-tender, non-distended, normal bowel sounds, no masses or organomegaly  Extremities: no cyanosis, no clubbing and no edema  Neurologic: no cranial nerve deficit and speech normal. Memory impaired      Recent Labs     07/24/22  0640 07/25/22  0551 07/26/22  0627    140 142   K 4.3 4.1 4.5   * 107 108*   CO2 28 29 30*   BUN 28* 24* 23   CREATININE 0.9 0.8 0.9   GLUCOSE 121* 122* 116*   CALCIUM 8.3* 8.1* 8.2*         Recent Labs     07/24/22  0640 07/25/22  0551 07/26/22  0627   ALKPHOS 75 68 72   PROT 5.6* 5.3* 5.5*   LABALBU 2.4* 2.3* 2.3*   BILITOT 0.3 0.3 0.3   AST 17 15 14   ALT 5 <5 <5         Recent Labs     07/24/22  0640 07/25/22  0551 07/26/22 0627   WBC 8.2 7.8 10.0   RBC 2.93* 2.80* 2.93*   HGB 8.4* 7.9* 8.2*   HCT 27.8* 26.0* 26.9*   MCV 94.9 92.9 91.8   MCH 28.7 28.2 28.0   MCHC 30.2* 30.4* 30.5*   RDW 15.9* 15.7* 15.7*    180 204   MPV 12.6* 12.0 11.5             Radiology:   XR CHEST PORTABLE   Final Result   Persistent the bilateral infiltrates. See report CT chest July 20. CTA PULMONARY W CONTRAST   Final Result   No evidence of pulmonary embolism. Scattered ground-glass and consolidative opacities, greater on the right,   concerning for multifocal pneumonia. Clinical correlation recommended. XR CHEST PORTABLE   Final Result   Nodular density in the right parahilar region of 2.5 cm. Further evaluation   with CT chest recommended. Assessment:  Principal Problem:    Respiratory failure ([address]  Active Problems:    Severe protein-calorie malnutrition ([facility] [address]  Resolved Problems:    * No resolved hospital problems.  *      Plan: History of present illness from History and Physical:  80 y.o. male  has a past medical history of respiratory failure, PEG, repeated urinary infection, lives at nursing home acquired hypothyroidism, Acute renal failure ([address] Arthritis, Asthma, History of cardiovascular stress test, and Hypertension. presented with respiratory failure. Chest x-ray may suggest potential lesion. Patient unable to tolerate a CAT scan. He has a chronic Shipman  appears alert and awake with no acute distress and is able to answer simple  questions. On direct questioning, patient denied any  resting ongoing chest pain, resting SOB, hemoptysis, productive cough, fever, ongoing palpitation, active abdominal pain, hematemesis, rectal bleeding, nelda, any other  and GI complaints and any new focal neuro deficits . Hemoglobin 10.3. WBC 11.7. Creatinine 1.1. BNP 2423. Lactate is 3.5    Septic shock due to b/l pna complicated by dehydration / hypovolemic shock & UTI: pulmon on.  7/23 moved from ICU to floor. S/p Solucortef x 5 doses. Roland Eleazar been on Empiric abx with vanco and cefipime, 1st doses 7/21  But Ucx showing pseudomonas and staph aur showing sensitivity to these. Changed cefipime for levaquin to cover pseudomonas. Finish course of vancomycin as inpatient to cover staph. May return to nursing home on 7/26 once completed 7 days of these antibiotics. However I will give oral Levaquin for 3 more days after discharge. Old left stroke with resulting right weakness  H/o htn: bp has been ok  Indwelling shipman with uti / pyelo: Ucx as above  Dementia by hx. Vascular component from #2? Tsh 6.5 in 11/21. No thyroid meds on home med list  Recent d/c from here 6/24  Dvt prophy on systemic anticoagulation with xarelto  Full code  Disposition back to the nursing home. Pt ot already on    NOTE: This report was transcribed using voice recognition software. Every effort was made to ensure accuracy; however, inadvertent computerized transcription errors may be present. Electronically signed by Tara Benito MD on 7/26/2022 at 5:03 PM

## 2022-07-27 VITALS
BODY MASS INDEX: 21.19 KG/M2 | TEMPERATURE: 97.8 F | HEART RATE: 95 BPM | SYSTOLIC BLOOD PRESSURE: 119 MMHG | RESPIRATION RATE: 16 BRPM | OXYGEN SATURATION: 95 % | HEIGHT: 71 IN | WEIGHT: 151.4 LBS | DIASTOLIC BLOOD PRESSURE: 68 MMHG

## 2022-07-27 LAB
ALBUMIN SERPL-MCNC: 2.1 G/DL (ref 3.5–5.2)
ALP BLD-CCNC: 60 U/L (ref 40–129)
ALT SERPL-CCNC: <5 U/L (ref 0–40)
ANION GAP SERPL CALCULATED.3IONS-SCNC: 7 MMOL/L (ref 7–16)
AST SERPL-CCNC: 16 U/L (ref 0–39)
BILIRUB SERPL-MCNC: 0.3 MG/DL (ref 0–1.2)
BUN BLDV-MCNC: 26 MG/DL (ref 6–23)
CALCIUM SERPL-MCNC: 8.7 MG/DL (ref 8.6–10.2)
CHLORIDE BLD-SCNC: 106 MMOL/L (ref 98–107)
CO2: 29 MMOL/L (ref 22–29)
CREAT SERPL-MCNC: 0.9 MG/DL (ref 0.7–1.2)
GFR AFRICAN AMERICAN: >60
GFR NON-AFRICAN AMERICAN: >60 ML/MIN/1.73
GLUCOSE BLD-MCNC: 112 MG/DL (ref 74–99)
HCT VFR BLD CALC: 27.3 % (ref 37–54)
HEMOGLOBIN: 8.1 G/DL (ref 12.5–16.5)
MAGNESIUM: 2.2 MG/DL (ref 1.6–2.6)
MCH RBC QN AUTO: 27.6 PG (ref 26–35)
MCHC RBC AUTO-ENTMCNC: 29.7 % (ref 32–34.5)
MCV RBC AUTO: 92.9 FL (ref 80–99.9)
PDW BLD-RTO: 15.9 FL (ref 11.5–15)
PHOSPHORUS: 3.3 MG/DL (ref 2.5–4.5)
PLATELET # BLD: 240 E9/L (ref 130–450)
PMV BLD AUTO: 11.7 FL (ref 7–12)
POTASSIUM SERPL-SCNC: 5 MMOL/L (ref 3.5–5)
RBC # BLD: 2.94 E12/L (ref 3.8–5.8)
SODIUM BLD-SCNC: 142 MMOL/L (ref 132–146)
TOTAL PROTEIN: 5.8 G/DL (ref 6.4–8.3)
WBC # BLD: 12.5 E9/L (ref 4.5–11.5)

## 2022-07-27 PROCEDURE — 36415 COLL VENOUS BLD VENIPUNCTURE: CPT

## 2022-07-27 PROCEDURE — 99239 HOSP IP/OBS DSCHRG MGMT >30: CPT | Performed by: INTERNAL MEDICINE

## 2022-07-27 PROCEDURE — 6370000000 HC RX 637 (ALT 250 FOR IP): Performed by: INTERNAL MEDICINE

## 2022-07-27 PROCEDURE — 85027 COMPLETE CBC AUTOMATED: CPT

## 2022-07-27 PROCEDURE — 84100 ASSAY OF PHOSPHORUS: CPT

## 2022-07-27 PROCEDURE — 6360000002 HC RX W HCPCS: Performed by: INTERNAL MEDICINE

## 2022-07-27 PROCEDURE — 94640 AIRWAY INHALATION TREATMENT: CPT

## 2022-07-27 PROCEDURE — 83735 ASSAY OF MAGNESIUM: CPT

## 2022-07-27 PROCEDURE — 80053 COMPREHEN METABOLIC PANEL: CPT

## 2022-07-27 RX ORDER — LEVOFLOXACIN 500 MG/1
500 TABLET, FILM COATED ORAL DAILY
Qty: 3 TABLET | Refills: 0 | Status: SHIPPED | OUTPATIENT
Start: 2022-07-28 | End: 2022-07-31

## 2022-07-27 RX ORDER — LANSOPRAZOLE
30 KIT
Qty: 300 ML | Refills: 0 | Status: SHIPPED | OUTPATIENT
Start: 2022-07-28 | End: 2022-09-25 | Stop reason: ALTCHOICE

## 2022-07-27 RX ADMIN — LEVOFLOXACIN 500 MG: 500 TABLET, FILM COATED ORAL at 08:55

## 2022-07-27 RX ADMIN — RIVAROXABAN 20 MG: 20 TABLET, FILM COATED ORAL at 08:53

## 2022-07-27 RX ADMIN — AMLODIPINE BESYLATE 2.5 MG: 5 TABLET ORAL at 08:53

## 2022-07-27 RX ADMIN — HEPARIN 100 UNITS: 100 SYRINGE at 09:05

## 2022-07-27 RX ADMIN — ATORVASTATIN CALCIUM 40 MG: 40 TABLET, FILM COATED ORAL at 08:53

## 2022-07-27 RX ADMIN — IPRATROPIUM BROMIDE AND ALBUTEROL SULFATE 1 AMPULE: .5; 2.5 SOLUTION RESPIRATORY (INHALATION) at 05:31

## 2022-07-27 RX ADMIN — DIVALPROEX SODIUM 250 MG: 125 CAPSULE, COATED PELLETS ORAL at 08:54

## 2022-07-27 RX ADMIN — METOPROLOL TARTRATE 25 MG: 25 TABLET, FILM COATED ORAL at 08:53

## 2022-07-27 RX ADMIN — IPRATROPIUM BROMIDE AND ALBUTEROL SULFATE 1 AMPULE: .5; 2.5 SOLUTION RESPIRATORY (INHALATION) at 08:49

## 2022-07-27 RX ADMIN — DIVALPROEX SODIUM 250 MG: 125 CAPSULE, COATED PELLETS ORAL at 16:21

## 2022-07-27 RX ADMIN — MINERAL SUPPLEMENT IRON 300 MG / 5 ML STRENGTH LIQUID 100 PER BOX UNFLAVORED 220 MG: at 08:56

## 2022-07-27 RX ADMIN — IPRATROPIUM BROMIDE AND ALBUTEROL SULFATE 1 AMPULE: .5; 2.5 SOLUTION RESPIRATORY (INHALATION) at 14:09

## 2022-07-27 RX ADMIN — NYSTATIN 500000 UNITS: 100000 SUSPENSION ORAL at 08:53

## 2022-07-27 RX ADMIN — ASPIRIN 81 MG CHEWABLE TABLET 81 MG: 81 TABLET CHEWABLE at 08:53

## 2022-07-27 NOTE — DISCHARGE SUMMARY
[facility] Physician Discharge Summary       [address] [facility] [place] [address]  [phone]        Activity level: Slowly increase as tolerated    Diet: Diet NPO  ADULT TUBE FEEDING; PEG; Standard with Fiber; Cyclic; 70; 0:89 PM; 13:71 PM; 100; Q 6 hours    Labs: None are pending at the discharge    Condition at discharge: Stable    Dispo: Return to facility    Patient ID:  Cherrie Kelley  [id number]  80 y.o.  1950    Admit date: 7/20/2022    Discharge date and time:  7/27/2022  1:59 PM    Admission Diagnoses: Principal Problem:    Respiratory failure ([address]  Active Problems:    Severe protein-calorie malnutrition ([address]  Resolved Problems:    * No resolved hospital problems. *      Discharge Diagnoses: Principal Problem:    Respiratory failure ([address]  Active Problems:    Severe protein-calorie malnutrition ([address]  Resolved Problems:    * No resolved hospital problems. *      Consults:  IP CONSULT TO INTERNAL MEDICINE  IP CONSULT TO CRITICAL CARE  IP CONSULT TO PHARMACY  IP CONSULT TO UROLOGY  IP CONSULT TO SPIRITUAL SERVICES    Procedures: None significant except if described in hospital course. Hospital Course: History of present illness from History and Physical:  80 y.o. male  has a past medical history of respiratory failure, PEG, repeated urinary infection, lives at nursing home acquired hypothyroidism, Acute renal failure ([address] Arthritis, Asthma, History of cardiovascular stress test, and Hypertension. presented with respiratory failure. Chest x-ray may suggest potential lesion. Patient unable to tolerate a CAT scan. He has a chronic Devine  appears alert and awake with no acute distress and is able to answer simple  questions.  On direct questioning, patient denied any  resting ongoing chest pain, resting SOB, hemoptysis, productive cough, fever, ongoing palpitation, active abdominal pain, hematemesis, rectal bleeding, nelda, any other  and GI complaints and any new focal neuro deficits . Hemoglobin 10.3. WBC 11.7. Creatinine 1.1. BNP 2423. Lactate is 3.5     Septic shock due to b/l pna complicated by dehydration / hypovolemic shock & UTI: pulmon on.  7/23 moved from ICU to floor. S/p Solucortef x 5 doses. Remington Bassett been on Empiric abx with vanco and cefipime, 1st doses 7/21  But Ucx showing pseudomonas and staph aur showing sensitivity to these. Changed cefipime for levaquin to cover pseudomonas. Finish course of vancomycin as inpatient to cover staph. May return to nursing home on 7/27 once completed 7 days of these antibiotics. However I will give oral Levaquin for 3 more days after discharge. Old left stroke with resulting right weakness  H/o htn: bp has been ok  Indwelling shipman with uti / pyelo: Ucx as above  Dementia by hx. Vascular component from #2? Tsh 6.5 in 11/21. No thyroid meds on home med list  Recent d/c from here 6/24  systemic anticoagulation with xarelto  Full code    Discharge Exam:  Vitals:    07/26/22 0815 07/26/22 2050 07/26/22 2145 07/27/22 0745   BP: (!) 118/55 116/68 118/68 119/68   Pulse: 87 99 99 95   Resp: 17  16 16   Temp: 98.3 °F (36.8 °C)  98.6 °F (37 °C) 97.8 °F (36.6 °C)   TempSrc: Oral  Oral Oral   SpO2: 95%  95% 95%   Weight:       Height:           No acute distress moist membranes   Normocephalic, without obvious abnormality, atraumatic, external ears without lesions,   Neck supple no cervical lymphadenopathy  Heart has a regular rate and rhythm no murmur  Lungs are clear to auscultation bilaterally with equal movements. Abdomen is soft nontender nondistended no rebound or guarding. No  significant peripheral edema good peripheral perfusion. No significant rashes or new skin lesions. No new focality on neuro exam which is overall grossly intact at his baseline. He is remembering my name. Affect and mood seem to be appropriate     I/O last 3 completed shifts:   In: 855 [I.V.:15; NG/GT:840]  Out: 6900 [Urine:6900]  I/O this shift: In: 520 [NG/GT:520]  Out: -       LABS:  Recent Labs     07/25/22  0551 07/26/22  0627 07/27/22  0750    142 142   K 4.1 4.5 5.0    108* 106   CO2 29 30* 29   BUN 24* 23 26*   CREATININE 0.8 0.9 0.9   GLUCOSE 122* 116* 112*   CALCIUM 8.1* 8.2* 8.7       Recent Labs     07/25/22  0551 07/26/22 0627 07/27/22  0750   WBC 7.8 10.0 12.5*   RBC 2.80* 2.93* 2.94*   HGB 7.9* 8.2* 8.1*   HCT 26.0* 26.9* 27.3*   MCV 92.9 91.8 92.9   MCH 28.2 28.0 27.6   MCHC 30.4* 30.5* 29.7*   RDW 15.7* 15.7* 15.9*    204 240   MPV 12.0 11.5 11.7       No results for input(s): POCGLU in the last 72 hours. Imaging:  Echo Complete    Result Date: 7/21/2022  Transthoracic Echocardiography Report (TTE)  Demographics   Patient Name    Mauri Cr     Gender            Male                  [address]  [id number]     Room Number       [address]  Number   Account #       [de-identified]    Procedure Date    07/21/2022   Corporate ID                 Ordering                               Physician   Accession       [id number]   Referring  Number                       Physician   Date of Birth   1950   [facility]   Age             80 year(s)   Interpreting      [address]                               Physician         Physician Cardiology                                                 Cipriano Sevilla MD                                Any Other  Procedure Type of Study   TTE procedure  Procedure Date Date: 07/21/2022 Start: 02:20 PM Study Location: Portable Technical Quality: Adequate visualization Indications:Congestive heart failure. Patient Status: Routine Height: 71 inches Weight: 151 pounds BSA: 1.87 m^2 BMI: 21.06 kg/m^2 Rhythm: Within normal limits HR: 80 bpm BP: 111/60 mmHg  Findings   Left Ventricle  Normal left ventricular chamber size. Normal left ventricular systolic function, EF 61%.   Mild left ventricular concentric hypertrophy noted. Stage I diastolic dysfunction. Normal left atrial pressure. Right Ventricle  Mild right ventricular enlargement with normal function, TAPSE 1.9cm. Left Atrium  Left atrium is of normal size. Interatrial septum not well visualized but appears intact. Right Atrium  Normal right atrium. Mitral Valve  Normal mitral valve structure. There is trace mitral regurgitation. No mitral valve prolapse. Tricuspid Valve  Normal tricuspid valve structure. Aortic Valve  The aortic valve appears mildly sclerotic. No hemodynamically significant aortic stenosis is present. Pulmonic Valve  The pulmonic valve was not well visualized. Pericardial Effusion  Anterior epicardial fat vs. small pericardial effusion. Pericardium appears normal.   Pleural Effusion  No evidence of pleural effusion. Aorta  Normal aortic root size and ascending aorta. Miscellaneous  The inferior vena cava diameter is normal with normal respiratory  variation. Conclusions   Summary  Technically sub-optimal images. Normal left ventricular chamber size. Normal left ventricular systolic function, EF 96%. Mild left ventricular concentric hypertrophy noted. Stage I diastolic dysfunction. Normal left atrial pressure. Left atrium is of normal size. Interatrial septum not well visualized but appears intact. Mild right ventricular enlargement with normal function, TAPSE 1.9cm. There is trace mitral regurgitation. No mitral valve prolapse. The aortic valve appears mildly sclerotic. No hemodynamically significant aortic stenosis is present. Normal aortic root size. Anterior epicardial fat vs. small pericardial effusion. No intra cardiac mass or thrombus. Compared to prior echo from 3/17/2016.    Signature   ----------------------------------------------------------------  Electronically signed by Paola Healy MD(Interpreting  physician) on 07/21/2022 04:33 PM HISTORY: Reason for exam:->sob FINDINGS: There are some areas of discrete atelectasis in the lower lung bases with relative mild loss of volume of the right lower lung base. There is a vague ill-defined pulmonary nodule in the right parahilar region of about the 2.5 cm. The pulmonary nodule/mass suspected. Further evaluation with CT chest recommended. Heart is normal size. The mediastinum appears unremarkable. The pleural space are free. Nodular density in the right parahilar region of 2.5 cm. Further evaluation with CT chest recommended. CTA PULMONARY W CONTRAST    Result Date: 7/20/2022  EXAMINATION: CTA OF THE CHEST 7/20/2022 10:48 pm TECHNIQUE: CTA of the chest was performed after the administration of intravenous contrast.  Multiplanar reformatted images are provided for review. MIP images are provided for review. Automated exposure control, iterative reconstruction, and/or weight based adjustment of the mA/kV was utilized to reduce the radiation dose to as low as reasonably achievable. COMPARISON: None. HISTORY: ORDERING SYSTEM PROVIDED HISTORY: sob TECHNOLOGIST PROVIDED HISTORY: Reason for exam:->sob Decision Support Exception - unselect if not a suspected or confirmed emergency medical condition->Emergency Medical Condition (MA) FINDINGS: Pulmonary Arteries: Pulmonary arteries are adequately opacified for evaluation. No evidence of intraluminal filling defect to suggest pulmonary embolism. Main pulmonary artery is normal in caliber. Mediastinum: No evidence of mediastinal lymphadenopathy. The heart and pericardium demonstrate no acute abnormality. There is no acute abnormality of the thoracic aorta. Lungs/pleura: There are scattered ground-glass and consolidative opacities, greater on the right. There is bilateral dependent atelectasis. No pneumothorax or pleural effusion. Upper Abdomen:  Limited images of the upper abdomen demonstrate no acute abnormality.  Soft Tissues/Bones: No acute bone or soft tissue abnormality. No evidence of pulmonary embolism. Scattered ground-glass and consolidative opacities, greater on the right, concerning for multifocal pneumonia. Clinical correlation recommended. Patient Instructions:   Current Discharge Medication List        START taking these medications    Details   lansoprazole 3 MG/ML SUSP 10 mLs by Per G Tube route every morning (before breakfast)  Qty: 300 mL, Refills: 0      nystatin (MYCOSTATIN) 535634 UNIT/ML suspension Take 5 mLs by mouth in the morning and 5 mLs at noon and 5 mLs in the evening and 5 mLs before bedtime. Qty: 600 mL, Refills: 0           CONTINUE these medications which have NOT CHANGED    Details   metoprolol tartrate (LOPRESSOR) 25 MG tablet 1 tablet by PEG Tube route 2 times daily  Qty: 60 tablet, Refills: 3      amLODIPine (NORVASC) 2.5 MG tablet 1 tablet by PEG Tube route daily    Associated Diagnoses: Essential hypertension      acetaminophen (TYLENOL) 650 MG/20.3ML SUSP Take 650 mg by mouth every 6 hours as needed for Pain      aspirin 81 MG chewable tablet 81 mg by PEG Tube route daily      atorvastatin (LIPITOR) 40 MG tablet 40 mg by PEG Tube route daily      divalproex (DEPAKOTE SPRINKLE) 125 MG capsule Take 125 mg by mouth 2 times daily Give 2 capsules via peg tube three times a day      docusate sodium (COLACE) 100 MG capsule 100 mg by PEG Tube route 2 times daily      melatonin 3 MG TABS tablet 5 mg by PEG Tube route nightly as needed      nitroGLYCERIN (NITROSTAT) 0.4 MG SL tablet Place 0.4 mg under the tongue every 5 minutes as needed for Chest pain up to max of 3 total doses. If no relief after 1 dose, call 911.       OLANZapine (ZYPREXA) 5 MG tablet 5 mg by PEG Tube route nightly      rivaroxaban (XARELTO) 20 MG TABS tablet 20 mg by PEG Tube route daily (with breakfast)      ferrous sulfate 220 (44 Fe) MG/5ML solution Take 220 mg by mouth daily Patient take 7.5mL via peg tube               Note that more than 30 minutes was spent in preparing discharge papers, discussing discharge with patient, medication review, etc.    NOTE: This report was transcribed using voice recognition software. Every effort was made to ensure accuracy; however, inadvertent computerized transcription errors may be present.      Signed:  Electronically signed by Bernabe Hernández MD on 7/27/2022 at 1:59 PM

## 2022-07-27 NOTE — CARE COORDINATION
SOCIAL WORK / DISCHARGE PLANNING:   COVID neg 7/25, no further testing needed prior to dc. Plan return to [facility], [address] [facility], fax [phone] proposed for today 7/27. Transport will be arranged when dc order obtained. NO PRECERT needed per [employer] rep. STEVAN will need signed by physician. Addendum: 227pm Dc order noted, Transport arranged via [facility] RN charge aware. Ambulance form completed and tubed to unit.              Electronically signed by NIK Cavazos on 7/27/2022 at 10:02 AM

## 2022-07-27 NOTE — PROGRESS NOTES
Nurse to nurse called to [facility]. Patient is for pickup at 5:30pm.

## 2022-09-25 ENCOUNTER — APPOINTMENT (OUTPATIENT)
Dept: GENERAL RADIOLOGY | Age: 72
End: 2022-09-25
Payer: MEDICARE

## 2022-09-25 ENCOUNTER — HOSPITAL ENCOUNTER (EMERGENCY)
Age: 72
Discharge: HOME | End: 2022-09-25
Attending: EMERGENCY MEDICINE
Payer: MEDICARE

## 2022-09-25 VITALS
HEART RATE: 105 BPM | TEMPERATURE: 98.4 F | SYSTOLIC BLOOD PRESSURE: 133 MMHG | DIASTOLIC BLOOD PRESSURE: 84 MMHG | OXYGEN SATURATION: 96 % | RESPIRATION RATE: 21 BRPM

## 2022-09-25 DIAGNOSIS — J18.9 PNEUMONIA DUE TO INFECTIOUS ORGANISM, UNSPECIFIED LATERALITY, UNSPECIFIED PART OF LUNG: Primary | ICD-10-CM

## 2022-09-25 LAB
ANION GAP SERPL CALCULATED.3IONS-SCNC: 7 MMOL/L (ref 7–16)
ANISOCYTOSIS: ABNORMAL
BASOPHILIC STIPPLING: ABNORMAL
BASOPHILS ABSOLUTE: 0.15 E9/L (ref 0–0.2)
BASOPHILS RELATIVE PERCENT: 1.7 % (ref 0–2)
BUN BLDV-MCNC: 30 MG/DL (ref 6–23)
CALCIUM SERPL-MCNC: 9.3 MG/DL (ref 8.6–10.2)
CHLORIDE BLD-SCNC: 99 MMOL/L (ref 98–107)
CO2: 28 MMOL/L (ref 22–29)
CREAT SERPL-MCNC: 0.8 MG/DL (ref 0.7–1.2)
EOSINOPHILS ABSOLUTE: 0.23 E9/L (ref 0.05–0.5)
EOSINOPHILS RELATIVE PERCENT: 2.6 % (ref 0–6)
GFR AFRICAN AMERICAN: >60
GFR NON-AFRICAN AMERICAN: >60 ML/MIN/1.73
GLUCOSE BLD-MCNC: 89 MG/DL (ref 74–99)
HCT VFR BLD CALC: 32.1 % (ref 37–54)
HEMOGLOBIN: 9.9 G/DL (ref 12.5–16.5)
HYPOCHROMIA: ABNORMAL
LACTIC ACID, SEPSIS: 0.9 MMOL/L (ref 0.5–1.9)
LYMPHOCYTES ABSOLUTE: 0.8 E9/L (ref 1.5–4)
LYMPHOCYTES RELATIVE PERCENT: 8.6 % (ref 20–42)
MCH RBC QN AUTO: 25.1 PG (ref 26–35)
MCHC RBC AUTO-ENTMCNC: 30.8 % (ref 32–34.5)
MCV RBC AUTO: 81.5 FL (ref 80–99.9)
MONOCYTES ABSOLUTE: 1.51 E9/L (ref 0.1–0.95)
MONOCYTES RELATIVE PERCENT: 17.2 % (ref 2–12)
NEUTROPHILS ABSOLUTE: 6.23 E9/L (ref 1.8–7.3)
NEUTROPHILS RELATIVE PERCENT: 69.8 % (ref 43–80)
OVALOCYTES: ABNORMAL
PDW BLD-RTO: 16.8 FL (ref 11.5–15)
PLATELET # BLD: 262 E9/L (ref 130–450)
PMV BLD AUTO: 11.9 FL (ref 7–12)
POIKILOCYTES: ABNORMAL
POLYCHROMASIA: ABNORMAL
POTASSIUM SERPL-SCNC: 4.8 MMOL/L (ref 3.5–5)
PRO-BNP: 412 PG/ML (ref 0–125)
RBC # BLD: 3.94 E12/L (ref 3.8–5.8)
SARS-COV-2, NAAT: NOT DETECTED
SODIUM BLD-SCNC: 134 MMOL/L (ref 132–146)
TARGET CELLS: ABNORMAL
TROPONIN, HIGH SENSITIVITY: 78 NG/L (ref 0–11)
TROPONIN, HIGH SENSITIVITY: 85 NG/L (ref 0–11)
WBC # BLD: 8.9 E9/L (ref 4.5–11.5)

## 2022-09-25 PROCEDURE — 71045 X-RAY EXAM CHEST 1 VIEW: CPT

## 2022-09-25 PROCEDURE — 96374 THER/PROPH/DIAG INJ IV PUSH: CPT

## 2022-09-25 PROCEDURE — 83605 ASSAY OF LACTIC ACID: CPT

## 2022-09-25 PROCEDURE — 2580000003 HC RX 258: Performed by: EMERGENCY MEDICINE

## 2022-09-25 PROCEDURE — 93005 ELECTROCARDIOGRAM TRACING: CPT | Performed by: EMERGENCY MEDICINE

## 2022-09-25 PROCEDURE — 85025 COMPLETE CBC W/AUTO DIFF WBC: CPT

## 2022-09-25 PROCEDURE — 83880 ASSAY OF NATRIURETIC PEPTIDE: CPT

## 2022-09-25 PROCEDURE — 2500000003 HC RX 250 WO HCPCS: Performed by: EMERGENCY MEDICINE

## 2022-09-25 PROCEDURE — 6370000000 HC RX 637 (ALT 250 FOR IP): Performed by: EMERGENCY MEDICINE

## 2022-09-25 PROCEDURE — 87040 BLOOD CULTURE FOR BACTERIA: CPT

## 2022-09-25 PROCEDURE — 99285 EMERGENCY DEPT VISIT HI MDM: CPT

## 2022-09-25 PROCEDURE — 87635 SARS-COV-2 COVID-19 AMP PRB: CPT

## 2022-09-25 PROCEDURE — 36415 COLL VENOUS BLD VENIPUNCTURE: CPT

## 2022-09-25 PROCEDURE — 80048 BASIC METABOLIC PNL TOTAL CA: CPT

## 2022-09-25 PROCEDURE — 84484 ASSAY OF TROPONIN QUANT: CPT

## 2022-09-25 RX ORDER — DOXYCYCLINE HYCLATE 100 MG/1
100 CAPSULE ORAL ONCE
Status: DISCONTINUED | OUTPATIENT
Start: 2022-09-25 | End: 2022-09-25

## 2022-09-25 RX ORDER — DOXYCYCLINE HYCLATE 100 MG
100 TABLET ORAL 2 TIMES DAILY
Qty: 20 TABLET | Refills: 0 | Status: SHIPPED | OUTPATIENT
Start: 2022-09-25 | End: 2022-10-05

## 2022-09-25 RX ORDER — SODIUM PHOSPHATE, DIBASIC AND SODIUM PHOSPHATE, MONOBASIC 7; 19 G/133ML; G/133ML
1 ENEMA RECTAL
COMMUNITY

## 2022-09-25 RX ORDER — BALSAM PERU/CASTOR OIL
1 OINTMENT (GRAM) TOPICAL 3 TIMES DAILY
COMMUNITY

## 2022-09-25 RX ORDER — 0.9 % SODIUM CHLORIDE 0.9 %
1000 INTRAVENOUS SOLUTION INTRAVENOUS ONCE
Status: COMPLETED | OUTPATIENT
Start: 2022-09-25 | End: 2022-09-25

## 2022-09-25 RX ORDER — ACETAMINOPHEN 325 MG/1
650 TABLET ORAL EVERY 6 HOURS PRN
COMMUNITY

## 2022-09-25 RX ORDER — METRONIDAZOLE 500 MG/1
500 TABLET ORAL ONCE
Status: COMPLETED | OUTPATIENT
Start: 2022-09-25 | End: 2022-09-25

## 2022-09-25 RX ORDER — M-VIT,TX,IRON,MINS/CALC/FOLIC 27MG-0.4MG
1 TABLET ORAL DAILY
COMMUNITY

## 2022-09-25 RX ORDER — PANTOPRAZOLE SODIUM 40 MG/1
40 GRANULE, DELAYED RELEASE ORAL
COMMUNITY

## 2022-09-25 RX ORDER — METRONIDAZOLE 500 MG/1
500 TABLET ORAL 2 TIMES DAILY
Qty: 20 TABLET | Refills: 0 | Status: SHIPPED | OUTPATIENT
Start: 2022-09-25 | End: 2022-10-05

## 2022-09-25 RX ORDER — IPRATROPIUM BROMIDE AND ALBUTEROL SULFATE 2.5; .5 MG/3ML; MG/3ML
1 SOLUTION RESPIRATORY (INHALATION) 4 TIMES DAILY
COMMUNITY

## 2022-09-25 RX ORDER — METOPROLOL TARTRATE 50 MG/1
50 TABLET, FILM COATED ORAL 2 TIMES DAILY
COMMUNITY

## 2022-09-25 RX ADMIN — METRONIDAZOLE 500 MG: 500 TABLET ORAL at 18:31

## 2022-09-25 RX ADMIN — DOXYCYCLINE 100 MG: 100 INJECTION, POWDER, LYOPHILIZED, FOR SOLUTION INTRAVENOUS at 19:05

## 2022-09-25 RX ADMIN — SODIUM CHLORIDE 1000 ML: 9 INJECTION, SOLUTION INTRAVENOUS at 15:46

## 2022-09-25 ASSESSMENT — ENCOUNTER SYMPTOMS
SORE THROAT: 0
TROUBLE SWALLOWING: 1
CHOKING: 1
CHEST TIGHTNESS: 0
WHEEZING: 1
VOMITING: 0
SHORTNESS OF BREATH: 0
ABDOMINAL PAIN: 0
BACK PAIN: 0
COUGH: 1
NAUSEA: 0
RHINORRHEA: 0

## 2022-09-25 ASSESSMENT — PAIN - FUNCTIONAL ASSESSMENT: PAIN_FUNCTIONAL_ASSESSMENT: NONE - DENIES PAIN

## 2022-09-27 LAB
EKG ATRIAL RATE: 100 BPM
EKG P AXIS: 73 DEGREES
EKG P-R INTERVAL: 218 MS
EKG Q-T INTERVAL: 364 MS
EKG QRS DURATION: 124 MS
EKG QTC CALCULATION (BAZETT): 469 MS
EKG R AXIS: 59 DEGREES
EKG T AXIS: 60 DEGREES
EKG VENTRICULAR RATE: 100 BPM

## 2022-09-30 LAB
BLOOD CULTURE, ROUTINE: NORMAL
CULTURE, BLOOD 2: NORMAL

## 2022-12-21 ENCOUNTER — OUTSIDE SERVICES (OUTPATIENT)
Dept: INTERNAL MEDICINE CLINIC | Age: 72
End: 2022-12-21

## 2022-12-21 DIAGNOSIS — N17.9 ACUTE RENAL FAILURE, UNSPECIFIED ACUTE RENAL FAILURE TYPE (HCC): Primary | ICD-10-CM

## 2022-12-21 DIAGNOSIS — J96.90 RESPIRATORY FAILURE, UNSPECIFIED CHRONICITY, UNSPECIFIED WHETHER WITH HYPOXIA OR HYPERCAPNIA (HCC): ICD-10-CM

## 2022-12-21 DIAGNOSIS — I10 HYPERTENSION, UNSPECIFIED TYPE: ICD-10-CM

## 2022-12-21 DIAGNOSIS — E43 SEVERE PROTEIN-CALORIE MALNUTRITION (HCC): ICD-10-CM

## 2022-12-21 DIAGNOSIS — E03.9 ACQUIRED HYPOTHYROIDISM: ICD-10-CM

## 2023-02-01 ENCOUNTER — OUTSIDE SERVICES (OUTPATIENT)
Dept: INTERNAL MEDICINE CLINIC | Age: 73
End: 2023-02-01

## 2023-02-01 DIAGNOSIS — N20.0 CALCULUS OF KIDNEY: ICD-10-CM

## 2023-02-01 DIAGNOSIS — I69.351 HEMIPLGA FOLLOWING CEREBRAL INFRC AFF RIGHT DOMINANT SIDE (HCC): ICD-10-CM

## 2023-02-01 DIAGNOSIS — I48.92 ATRIAL FLUTTER, UNSPECIFIED TYPE (HCC): ICD-10-CM

## 2023-02-01 DIAGNOSIS — I10 ESSENTIAL (PRIMARY) HYPERTENSION: ICD-10-CM

## 2023-02-01 DIAGNOSIS — N40.1 BENIGN PROSTATIC HYPERPLASIA WITH LOWER URINARY TRACT SYMPTOMS, SYMPTOM DETAILS UNSPECIFIED: ICD-10-CM

## 2023-02-01 DIAGNOSIS — E83.31 FAMILIAL HYPOPHOSPHATEMIA: ICD-10-CM

## 2023-02-01 DIAGNOSIS — I25.10 ATHSCL HEART DISEASE OF NATIVE CORONARY ARTERY W/O ANG PCTRS: ICD-10-CM

## 2023-02-01 DIAGNOSIS — D64.9 ANEMIA, UNSPECIFIED TYPE: ICD-10-CM

## 2023-02-01 DIAGNOSIS — K57.30 DVRTCLOS OF LG INT W/O PERFORATION OR ABSCESS W/O BLEEDING: ICD-10-CM

## 2023-02-01 DIAGNOSIS — Z93.1 GASTROSTOMY STATUS (HCC): ICD-10-CM

## 2023-02-01 DIAGNOSIS — J96.01 ACUTE RESPIRATORY FAILURE WITH HYPOXIA (HCC): ICD-10-CM

## 2023-02-01 DIAGNOSIS — F25.1 SCHIZOAFFECTIVE DISORDER, DEPRESSIVE TYPE (HCC): ICD-10-CM

## 2023-02-01 DIAGNOSIS — Z86.16 PERSONAL HISTORY OF COVID-19: ICD-10-CM

## 2023-02-01 DIAGNOSIS — E87.0 HYPEROSMOLALITY AND/OR HYPERNATREMIA: ICD-10-CM

## 2023-02-01 DIAGNOSIS — R91.1 SOLITARY PULMONARY NODULE: ICD-10-CM

## 2023-02-01 DIAGNOSIS — K92.2 GASTROINTESTINAL HEMORRHAGE, UNSPECIFIED GASTROINTESTINAL HEMORRHAGE TYPE: ICD-10-CM

## 2023-02-01 DIAGNOSIS — E44.1 MILD PROTEIN-CALORIE MALNUTRITION (HCC): ICD-10-CM

## 2023-02-01 DIAGNOSIS — K57.32 DVTRCLI OF LG INT W/O PERFORATION OR ABSCESS W/O BLEEDING: ICD-10-CM

## 2023-02-01 DIAGNOSIS — R13.12 DYSPHAGIA, OROPHARYNGEAL PHASE: ICD-10-CM

## 2023-02-01 DIAGNOSIS — N13.9 OBSTRUCTIVE AND REFLUX UROPATHY, UNSPECIFIED: ICD-10-CM

## 2023-02-01 DIAGNOSIS — I69.391 DYSPHAGIA FOLLOWING CEREBRAL INFARCTION: Primary | ICD-10-CM

## 2023-02-01 DIAGNOSIS — N18.9 CHRONIC KIDNEY DISEASE, UNSPECIFIED CKD STAGE: ICD-10-CM

## 2023-02-01 DIAGNOSIS — M62.81 MUSCLE WEAKNESS (GENERALIZED): ICD-10-CM

## 2023-02-01 DIAGNOSIS — N18.30 STAGE 3 CHRONIC KIDNEY DISEASE, UNSPECIFIED WHETHER STAGE 3A OR 3B CKD (HCC): ICD-10-CM

## 2023-02-01 DIAGNOSIS — K59.00 CONSTIPATION, UNSPECIFIED CONSTIPATION TYPE: ICD-10-CM

## 2023-02-01 DIAGNOSIS — I50.30 DIASTOLIC CONGESTIVE HEART FAILURE, UNSPECIFIED HF CHRONICITY (HCC): ICD-10-CM

## 2023-02-01 DIAGNOSIS — K40.21 BILATERAL INGUINAL HERNIA, WITHOUT OBSTRUCTION OR GANGRENE, RECURRENT: ICD-10-CM

## 2023-02-01 DIAGNOSIS — R06.02 SHORTNESS OF BREATH: ICD-10-CM

## 2023-02-01 DIAGNOSIS — L60.1 ONYCHOLYSIS: ICD-10-CM

## 2023-02-01 DIAGNOSIS — R47.1 DYSARTHRIA AND ANARTHRIA: ICD-10-CM

## 2023-02-01 DIAGNOSIS — E78.5 HYPERLIPIDEMIA, UNSPECIFIED HYPERLIPIDEMIA TYPE: ICD-10-CM

## 2023-02-01 DIAGNOSIS — Z74.1 NEED FOR ASSISTANCE WITH PERSONAL CARE: ICD-10-CM

## 2023-02-01 DIAGNOSIS — I63.312 CEREBRAL INFARCTION DUE TO THROMBOSIS OF LEFT MIDDLE CEREBRAL ARTERY (HCC): ICD-10-CM

## 2023-02-06 VITALS
TEMPERATURE: 97.8 F | WEIGHT: 150 LBS | HEART RATE: 68 BPM | SYSTOLIC BLOOD PRESSURE: 124 MMHG | RESPIRATION RATE: 16 BRPM | DIASTOLIC BLOOD PRESSURE: 74 MMHG | OXYGEN SATURATION: 98 % | BODY MASS INDEX: 20.92 KG/M2

## 2023-02-06 RX ORDER — FUROSEMIDE 40 MG/1
40 TABLET ORAL EVERY MORNING
COMMUNITY

## 2023-02-06 RX ORDER — MORPHINE SULFATE 100 MG/5ML
5 SOLUTION ORAL EVERY 4 HOURS PRN
COMMUNITY

## 2023-02-06 RX ORDER — PROMETHAZINE HYDROCHLORIDE 12.5 MG/1
12.5 SUPPOSITORY RECTAL EVERY 6 HOURS PRN
COMMUNITY

## 2023-02-06 RX ORDER — GUAIFENESIN 400 MG/1
400 TABLET ORAL EVERY MORNING
COMMUNITY

## 2023-02-06 RX ORDER — HYOSCYAMINE SULFATE 0.125 MG
125 TABLET ORAL EVERY 6 HOURS PRN
COMMUNITY

## 2023-02-06 RX ORDER — ACETAMINOPHEN 650 MG/1
650 SUPPOSITORY RECTAL EVERY 6 HOURS PRN
COMMUNITY

## 2023-02-06 RX ORDER — HYDROCODONE BITARTRATE AND ACETAMINOPHEN 5; 325 MG/1; MG/1
1 TABLET ORAL EVERY 6 HOURS PRN
COMMUNITY

## 2023-02-06 RX ORDER — BISACODYL 10 MG
10 SUPPOSITORY, RECTAL RECTAL DAILY PRN
COMMUNITY

## 2023-02-06 RX ORDER — OXYBUTYNIN CHLORIDE 5 MG/1
5 TABLET ORAL 3 TIMES DAILY
COMMUNITY

## 2023-02-06 RX ORDER — LORAZEPAM 0.5 MG/1
0.5 TABLET ORAL EVERY 4 HOURS PRN
COMMUNITY

## 2023-02-06 ASSESSMENT — ENCOUNTER SYMPTOMS
VOMITING: 0
ABDOMINAL PAIN: 0
SHORTNESS OF BREATH: 0
NAUSEA: 0
DIARRHEA: 0

## 2023-02-06 NOTE — PROGRESS NOTES
Visit Date: 02/01/2023  Samantha Ta Bertha  1950  male 71 y.o. Subjective:    CC: Patient presents with no current complaints. HPI: Patient seen and examined. Medications on chart reviewed. Labs are reviewed. Discussed with nursing staff. ROS:  Review of Systems   Constitutional:  Negative for chills and fever. Respiratory:  Negative for shortness of breath. Cardiovascular:  Negative for chest pain. Gastrointestinal:  Negative for abdominal pain, diarrhea, nausea and vomiting. Genitourinary:  Negative for dysuria and frequency. Neurological:  Negative for dizziness and headaches. Current Meds: Refer to nursing home record    PMH:    Medical Problems: reviewed and updated       Diagnosis Date    Acquired hypothyroidism 03/09/2016    Acute renal failure (HCC)     Arthritis     Asthma     History of cardiovascular stress test 03/23/2016    lexiscan    Hypertension     Stroke (cerebrum) [facility])         Surgical Hx: reviewed and updated   Past Surgical History:   Procedure Laterality Date    CHOLECYSTECTOMY  7/17/2015    HERNIA REPAIR  2005    Dr Mart Wong         FH: reviewed and updated       Problem Relation Age of Onset    Heart Failure Mother     Heart Disease Mother         CABG    Heart Failure Father         SH: reviewed and updated    reports that he quit smoking about 12 years ago. He has quit using smokeless tobacco. He reports that he does not drink alcohol and does not use drugs. Objective: There were no vitals taken for this visit. Physical Exam  HENT:      Head: Normocephalic and atraumatic. Right Ear: Tympanic membrane normal.      Left Ear: Tympanic membrane normal.      Nose: Nose normal.   Eyes:      Extraocular Movements: Extraocular movements intact. Pupils: Pupils are equal, round, and reactive to light. Cardiovascular:      Rate and Rhythm: Normal rate and regular rhythm. Heart sounds: S1 normal and S2 normal. No murmur heard. No friction rub. No gallop. Pulmonary:      Breath sounds: Examination of the right-lower field reveals decreased breath sounds. Examination of the left-lower field reveals decreased breath sounds. Decreased breath sounds present. Chest:      Chest wall: No tenderness. Abdominal:      General: Bowel sounds are normal. There is no distension. Palpations: Abdomen is soft. There is no mass. Tenderness: There is no abdominal tenderness. Comments: No organomegaly  PEG-Tube in place   Genitourinary:     Comments: Devine catheter in place  Musculoskeletal:         General: No tenderness. Normal range of motion. Cervical back: Neck supple. No tenderness. Right lower leg: No edema. Left lower leg: No edema. Comments: No clubbing, No cyanosis  Osteoarthritic changes   Skin:     General: Skin is warm and dry. Comments: Good turgor   Neurological:      General: No focal deficit present. Mental Status: He is alert. He is confused.       Comments: Some right-sided hemiparesis  Slightly dysarthric     Labs:  Date: 12/29/2022     CMP-COMPREHENSIVE METABOLIC PNL       GLUCOSE  99    Final      GLUCOSE, FASTING         65-99  mg/dL                               GLUCOSE, NON-FASTING      mg/dL       SODIUM  139 mEq/L 136-145  Final           POTASSIUM  4.8 mEq/L 3.5-5.3  Final           CHLORIDE  103 mEq/L   Final           CARBON DIOXIDE (CO2)  27 mEq/L 21-33  Final           BUN (UREA NITROGEN)  38 mg/dL 7-25 H Final           CREATININE  1.1 mg/dL 0.6-1.2  Final           BUN/CREATININE RATIO  35  6-22 H Final           GFR-AFRICAN AMERICAN  80 mL/min/1.73 m2 >60  Final           GFR-NON-AFRICAN AMERICAN  66 mL/min/1.73 m2 >60  Final           Stage of CKD     eGFR (mL/min/1.73 square meters)          Stage 1        >/= 90        or > 90          Stage 2        60 - 89          Stage 3        30 - 59          Stage 4        15 - 29          Stage 5        </= 14        or < 15  ** GFR is reliable for adults 17 to 71 years with stable kidney      function. CALCIUM  8.7 mg/dL 8.4-10.2  Final           PROTEIN, TOTAL  6.5 g/dL 6.0-8.3  Final           ALBUMIN  2.9 g/dL 3.5-5.5 L Final           A/G RATIO  0.8  1.0-2.3 L Final           ALKALINE PHOS  71 IU/L   Final           AST (SGOT)  14 IU/L 4-40  Final           ALT (SGPT)  4 IU/L 4-55  Final           BILIRUBIN, TOTAL  0.4 mg/dL 0.2-1.2  Final       CBC W/DIFF       WBC  8.7 K/cmm 4.5-10.8  Final           RBC  3.84 M/cmm 4.00-6.60 L Final           HEMOGLOBIN  10.2 g/dL 14.0-18.0 L Final           HEMATOCRIT  32.7 % 42.0-54.0 L Final           MCV  85.0 fL 80.0-100.0  Final           MCH  26.7 pg 26.0-35.0  Final           MCHC  31.4 g/dL 31.0-36.5  Final           RDW  19.5 % 11.0-16.0 H Final           PLATELET  455 K/cmm 275-478 L Final           MPV  12.0 fL 6.5-12.0  Final           ANISOCYTOSIS  2+  NEGATIVE A Final           NEUTROPHILS  71.3 % 40.0-80.0  Final           LYMPHS  9.8 % 13.0-48.0 L Final           MONOCYTES  15.9 % 2.0-12.0 H Final           EOS  2.4 % 0.0-8.0  Final           BASO  0.6 % 0.0-2.0  Final           NEUTS (ABSOLUTE)  6.20 K/uL 1.50-7.60  Final           LYMPHS (ABSOLUTE)  0.90 K/uL 0.90-5.50  Final           MONOCYTES (ABSOLUTE)  1.40 K/uL 0.15-1.10 H Final           EOS (ABSOLUTE)  0.20 K/uL 0.20-0.80  Final           NUCLEATED RBC  Panel/Test: Erythrocytes. nucleated  LOINC Code: 268-3  Lab Test Description: NUCLEATED RBC    Specimen #:   Specimen Source:   Specimen Site Modifier:   Collection Volume:   No. of Sample Containers:     Observation Method:   Nature of Abnormality:   0.1 NRBC/100 WBC <1.0  Final        Assessment & Plan:  1. Dysphagia following cerebral infarction  PEG tube in place and continue PEG tube feeding. 2. Anemia, unspecified type  Hemoglobin stable  3. Dvtrcli of lg int w/o perforation or abscess w/o bleeding  4.  Mild protein-calorie malnutrition (HCC)  Continue PEG tube feeding. 5. Diastolic congestive heart failure, unspecified HF chronicity ([address]  Compensated at this time. 6. Stage 3 chronic kidney disease, unspecified whether stage 3a or 3b CKD (HCC)  Creatinine stable. 7. Schizoaffective disorder, depressive type ([address]  8. Need for assistance with personal care  9. 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 Atrial flutter, unspecified type ([facility] [address]  11. Gastrostomy status ([facility] [address]  12. Dysphagia, oropharyngeal phase  Continue with PEG tube feeding. 13. Benign prostatic hyperplasia with lower urinary tract symptoms, symptom details unspecified  14. Cerebral infarction due to thrombosis of left middle cerebral artery ([facility] [address]  15. 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 Dysarthria and anarthria  17. Constipation, unspecified constipation type  Continue current bowel regimen. 18. Onycholysis  19. Essential (primary) hypertension  Blood pressure well controlled. Continue Lopressor 50 mg twice daily. 20. Hyperlipidemia, unspecified hyperlipidemia type  Continue atorvastatin 40 mg daily. Patient will need legal guardianship to care for his finances and otherwise.     Polina Jolly  am scribing for Dr. Cesilia Schultz MD, personally performed the services described in this documentation as scribed by Cathie Yeung and it is both accurate and complete

## 2023-03-15 ENCOUNTER — OUTSIDE SERVICES (OUTPATIENT)
Dept: INTERNAL MEDICINE CLINIC | Age: 73
End: 2023-03-15

## 2023-03-15 DIAGNOSIS — Z74.1 NEED FOR ASSISTANCE WITH PERSONAL CARE: ICD-10-CM

## 2023-03-15 DIAGNOSIS — K57.30 DVRTCLOS OF LG INT W/O PERFORATION OR ABSCESS W/O BLEEDING: ICD-10-CM

## 2023-03-15 DIAGNOSIS — N40.1 BENIGN PROSTATIC HYPERPLASIA WITH LOWER URINARY TRACT SYMPTOMS, SYMPTOM DETAILS UNSPECIFIED: ICD-10-CM

## 2023-03-15 DIAGNOSIS — N13.9 OBSTRUCTIVE AND REFLUX UROPATHY, UNSPECIFIED: ICD-10-CM

## 2023-03-15 DIAGNOSIS — N20.0 CALCULUS OF KIDNEY: ICD-10-CM

## 2023-03-15 DIAGNOSIS — Z93.1 GASTROSTOMY STATUS (HCC): ICD-10-CM

## 2023-03-15 DIAGNOSIS — I48.92 ATRIAL FLUTTER, UNSPECIFIED TYPE (HCC): ICD-10-CM

## 2023-03-15 DIAGNOSIS — I50.30 DIASTOLIC CONGESTIVE HEART FAILURE, UNSPECIFIED HF CHRONICITY (HCC): ICD-10-CM

## 2023-03-15 DIAGNOSIS — R47.1 DYSARTHRIA AND ANARTHRIA: ICD-10-CM

## 2023-03-15 DIAGNOSIS — L60.1 ONYCHOLYSIS: ICD-10-CM

## 2023-03-15 DIAGNOSIS — R13.12 DYSPHAGIA, OROPHARYNGEAL PHASE: ICD-10-CM

## 2023-03-15 DIAGNOSIS — K59.00 CONSTIPATION, UNSPECIFIED CONSTIPATION TYPE: ICD-10-CM

## 2023-03-15 DIAGNOSIS — I63.312 CEREBRAL INFARCTION DUE TO THROMBOSIS OF LEFT MIDDLE CEREBRAL ARTERY (HCC): ICD-10-CM

## 2023-03-15 DIAGNOSIS — N18.9 CHRONIC KIDNEY DISEASE, UNSPECIFIED CKD STAGE: ICD-10-CM

## 2023-03-15 DIAGNOSIS — I10 ESSENTIAL (PRIMARY) HYPERTENSION: ICD-10-CM

## 2023-03-15 DIAGNOSIS — K57.32 DVTRCLI OF LG INT W/O PERFORATION OR ABSCESS W/O BLEEDING: ICD-10-CM

## 2023-03-15 DIAGNOSIS — J96.01 ACUTE RESPIRATORY FAILURE WITH HYPOXIA (HCC): ICD-10-CM

## 2023-03-15 DIAGNOSIS — E87.0 HYPEROSMOLALITY AND/OR HYPERNATREMIA: ICD-10-CM

## 2023-03-15 DIAGNOSIS — E44.1 MILD PROTEIN-CALORIE MALNUTRITION (HCC): ICD-10-CM

## 2023-03-15 DIAGNOSIS — K92.2 GASTROINTESTINAL HEMORRHAGE, UNSPECIFIED GASTROINTESTINAL HEMORRHAGE TYPE: ICD-10-CM

## 2023-03-15 DIAGNOSIS — R06.02 SHORTNESS OF BREATH: ICD-10-CM

## 2023-03-15 DIAGNOSIS — Z86.16 PERSONAL HISTORY OF COVID-19: ICD-10-CM

## 2023-03-15 DIAGNOSIS — F25.1 SCHIZOAFFECTIVE DISORDER, DEPRESSIVE TYPE (HCC): ICD-10-CM

## 2023-03-15 DIAGNOSIS — K40.21 BILATERAL INGUINAL HERNIA, WITHOUT OBSTRUCTION OR GANGRENE, RECURRENT: ICD-10-CM

## 2023-03-15 DIAGNOSIS — I25.10 ATHSCL HEART DISEASE OF NATIVE CORONARY ARTERY W/O ANG PCTRS: ICD-10-CM

## 2023-03-15 DIAGNOSIS — N18.30 STAGE 3 CHRONIC KIDNEY DISEASE, UNSPECIFIED WHETHER STAGE 3A OR 3B CKD (HCC): ICD-10-CM

## 2023-03-15 DIAGNOSIS — I69.391 DYSPHAGIA FOLLOWING CEREBRAL INFARCTION: Primary | ICD-10-CM

## 2023-03-15 DIAGNOSIS — M62.81 MUSCLE WEAKNESS (GENERALIZED): ICD-10-CM

## 2023-03-15 DIAGNOSIS — D64.9 ANEMIA, UNSPECIFIED TYPE: ICD-10-CM

## 2023-03-15 DIAGNOSIS — I69.351 HEMIPLGA FOLLOWING CEREBRAL INFRC AFF RIGHT DOMINANT SIDE (HCC): ICD-10-CM

## 2023-03-15 DIAGNOSIS — E78.5 HYPERLIPIDEMIA, UNSPECIFIED HYPERLIPIDEMIA TYPE: ICD-10-CM

## 2023-03-15 DIAGNOSIS — R91.1 SOLITARY PULMONARY NODULE: ICD-10-CM

## 2023-03-15 DIAGNOSIS — E83.31 FAMILIAL HYPOPHOSPHATEMIA: ICD-10-CM

## 2023-03-21 VITALS
OXYGEN SATURATION: 96 % | BODY MASS INDEX: 21.2 KG/M2 | SYSTOLIC BLOOD PRESSURE: 131 MMHG | TEMPERATURE: 97.8 F | HEART RATE: 68 BPM | RESPIRATION RATE: 18 BRPM | WEIGHT: 152 LBS | DIASTOLIC BLOOD PRESSURE: 81 MMHG

## 2023-03-21 RX ORDER — FAMOTIDINE 20 MG/1
20 TABLET, FILM COATED ORAL EVERY MORNING
COMMUNITY

## 2023-03-21 RX ORDER — LOPERAMIDE HYDROCHLORIDE 2 MG/1
4 CAPSULE ORAL 4 TIMES DAILY PRN
COMMUNITY

## 2023-03-21 ASSESSMENT — ENCOUNTER SYMPTOMS
DIARRHEA: 0
ABDOMINAL PAIN: 0
VOMITING: 0
NAUSEA: 0
SHORTNESS OF BREATH: 0

## 2023-03-22 NOTE — PROGRESS NOTES
Visit Date: 03/15/2023 - [address]  1950  male 71 y.o. Subjective:    CC: Patient presents with no current complaints. This is a follow-up nursing home visit. HPI: Patient seen and examined. Medications on chart reviewed. Labs are reviewed. Discussed with nursing staff. ROS:  Review of Systems   Constitutional:  Negative for chills and fever. Respiratory:  Negative for shortness of breath. Cardiovascular:  Negative for chest pain. Gastrointestinal:  Negative for abdominal pain, diarrhea, nausea and vomiting. Genitourinary:  Negative for dysuria and frequency. Neurological:  Negative for dizziness and headaches. Current Meds: Refer to nursing home record    PMH:    Medical Problems: reviewed and updated       Diagnosis Date    Acquired hypothyroidism 03/09/2016    Acute renal failure (HCC)     Arthritis     Asthma     History of cardiovascular stress test 03/23/2016    lexiscan    Hypertension     Stroke (cerebrum) [facility])         Surgical Hx: reviewed and updated   Past Surgical History:   Procedure Laterality Date    CHOLECYSTECTOMY  7/17/2015    HERNIA REPAIR  2005    Dr Cary Hester         FH: reviewed and updated       Problem Relation Age of Onset    Heart Failure Mother     Heart Disease Mother         CABG    Heart Failure Father         SH: reviewed and updated    reports that he quit smoking about 12 years ago. He has quit using smokeless tobacco. He reports that he does not drink alcohol and does not use drugs. Objective:  /81   Pulse 68   Temp 97.8 °F (36.6 °C)   Resp 18   Wt 152 lb (68.9 kg)   SpO2 96%   BMI 21.20 kg/m²      Physical Exam  Constitutional:       General: He is awake. HENT:      Head: Normocephalic and atraumatic. Right Ear: Tympanic membrane normal.      Left Ear: Tympanic membrane normal.      Nose: Nose normal.   Eyes:      Extraocular Movements: Extraocular movements intact.       Pupils: Pupils are equal, round, and

## 2023-03-25 ENCOUNTER — HOSPITAL ENCOUNTER (EMERGENCY)
Age: 73
Discharge: HOME | End: 2023-03-25
Attending: EMERGENCY MEDICINE
Payer: MEDICARE

## 2023-03-25 VITALS
RESPIRATION RATE: 16 BRPM | HEART RATE: 74 BPM | SYSTOLIC BLOOD PRESSURE: 119 MMHG | TEMPERATURE: 98.6 F | OXYGEN SATURATION: 97 % | DIASTOLIC BLOOD PRESSURE: 79 MMHG

## 2023-03-25 DIAGNOSIS — T83.9XXA PROBLEM WITH FOLEY CATHETER, INITIAL ENCOUNTER (HCC): Primary | ICD-10-CM

## 2023-03-25 PROCEDURE — 51702 INSERT TEMP BLADDER CATH: CPT

## 2023-03-25 PROCEDURE — 99283 EMERGENCY DEPT VISIT LOW MDM: CPT

## 2023-03-25 NOTE — ED NOTES
Attempted to place shipman cath, resistance met. Dr Gloria Schafer aware.       Rosalind Covarrubias, RN  03/25/23 6813

## 2023-03-25 NOTE — ED PROVIDER NOTES
[address] ENCOUNTER    Pt Name: Ange Manzo  MRN: [id number]  [place] 1950  Date of evaluation: 3/25/2023  Provider: Cl Varela MD  PCP: Mariah Escobar MD  Note Started: 6:34 AM EDT 3/25/23    HPI     Patient is a 71 y.o. male presents with a chief complaint of   Chief Complaint   Patient presents with    Testicle Swelling     Scrotal swelling x2 days, shipman removed 30 mins prior to ems arrival   .    Patient presents with scrotal swelling over the past 2 days. Patient had Shipman removed just prior to EMS arriving at the facility. Patient stated that nothing bothers him right now. Patient denies any fevers, chills, nausea, vomiting, chest pain, shortness of breath, abdominal pain, changes in bowel habits. Patient stated that he has no testicular pain. Patient was sent in by the facility because he had swelling of his testicle. Patient stated that he has no pain in his testicles. Shipman was removed because it was not functioning properly. Nursing Notes were all reviewed and agreed with or any disagreements were addressed in the HPI. History From: Patient    Review of Systems   Positives and Pertinent negatives as per HPI. Physical Exam  Vitals and nursing note reviewed. Constitutional:       Appearance: He is well-developed. HENT:      Head: Normocephalic and atraumatic. Eyes:      Conjunctiva/sclera: Conjunctivae normal.   Cardiovascular:      Rate and Rhythm: Normal rate and regular rhythm. Heart sounds: Normal heart sounds. No murmur heard. Pulmonary:      Effort: Pulmonary effort is normal. No respiratory distress. Breath sounds: Normal breath sounds. No wheezing or rales. Abdominal:      General: Bowel sounds are normal.      Palpations: Abdomen is soft. Tenderness: There is no abdominal tenderness. There is no guarding or rebound.    Genitourinary:     Comments: Bilateral scrotal swelling. No tenderness to palpation. Musculoskeletal:         General: No tenderness or deformity. Cervical back: Normal range of motion and neck supple. Skin:     General: Skin is warm and dry. Neurological:      Mental Status: He is alert and oriented to person, place, and time. Cranial Nerves: No cranial nerve deficit. Coordination: Coordination normal.        Procedures     No results found for this or any previous visit. [facility]       ED Course as of 03/25/23 0808   Sat Mar 25, 2023   7440 Patient is having no testicular pain. Patient will have a Devine placed. Called and confirmed with patient's nursing home that patient is a DNR CC. Confirmed with patient that he only wants treatment that will make him feel better. Patient will receive a Devine. [JM]   8737 Resident physician placed 10 [place] Devine coudé catheter without difficulty. Good urine return. [JM]      ED Course User Index  [JM] Eliane Daniel MD      Patient is a 71 y.o. male presenting with scrotal swelling. Patient's last echo showed normal left ventricular size with an EF of 62%. Mild left ventricular hypertrophy. Stage I diastolic function. This was performed on 7/21/2022. Chart review of patient's discharge summary on 7/27/2022. At that time patient was admitted for septic shock. Patient had a indwelling Devine catheter at that time. Patient denied any  or GI problems at that time. Patient does have a history of dementia. Patient has no complaints at this time. Patient is alert and oriented. Shared decision making with patient and patient does not want unneeded labs. Patient is a DNR CC. Chart was updated. Paperwork was faxed. Shared decision making with patient and patient will receive a Devine catheter. No labs will be drawn. Patient is agreeable to this plan. Patient will be discharged back to the facility. Patient was given return precautions.   Patient will follow up with their primary care

## 2023-03-25 NOTE — ED NOTES
PT lives @[facility], [place] had a chronic shipman placed, removed by nursing staff 30 mins prior to EMS arrival, SNF staff (via EMS) stated that the \"swelling appeared to go down once shipman was removed. \" PT states he has no pain in the groin or abdomen @this time. PT paperwork states that he is [facility].       Carlos Arredondo RN  03/25/23 Torey Lino RN  03/25/23 9537

## 2023-04-12 ENCOUNTER — HOSPITAL ENCOUNTER (EMERGENCY)
Age: 73
Discharge: HOME | End: 2023-04-13
Attending: EMERGENCY MEDICINE
Payer: MEDICARE

## 2023-04-12 DIAGNOSIS — K92.0 COFFEE GROUND EMESIS: Primary | ICD-10-CM

## 2023-04-12 PROCEDURE — 6360000002 HC RX W HCPCS

## 2023-04-12 PROCEDURE — 96365 THER/PROPH/DIAG IV INF INIT: CPT

## 2023-04-12 PROCEDURE — C9113 INJ PANTOPRAZOLE SODIUM, VIA: HCPCS

## 2023-04-12 PROCEDURE — 99284 EMERGENCY DEPT VISIT MOD MDM: CPT

## 2023-04-12 PROCEDURE — 2580000003 HC RX 258

## 2023-04-12 PROCEDURE — 96375 TX/PRO/DX INJ NEW DRUG ADDON: CPT

## 2023-04-12 RX ORDER — PANTOPRAZOLE SODIUM 40 MG/1
40 TABLET, DELAYED RELEASE ORAL
Qty: 7 TABLET | Refills: 0 | Status: SHIPPED | OUTPATIENT
Start: 2023-04-12 | End: 2023-04-12

## 2023-04-12 RX ORDER — ONDANSETRON 4 MG/1
4 TABLET, ORALLY DISINTEGRATING ORAL 3 TIMES DAILY PRN
Qty: 21 TABLET | Refills: 0 | Status: SHIPPED | OUTPATIENT
Start: 2023-04-12 | End: 2023-04-12

## 2023-04-12 RX ADMIN — SODIUM CHLORIDE 80 MG: 9 INJECTION, SOLUTION INTRAVENOUS at 22:06

## 2023-04-13 VITALS
OXYGEN SATURATION: 96 % | SYSTOLIC BLOOD PRESSURE: 119 MMHG | DIASTOLIC BLOOD PRESSURE: 64 MMHG | HEART RATE: 99 BPM | RESPIRATION RATE: 22 BRPM | BODY MASS INDEX: 21.28 KG/M2 | TEMPERATURE: 100 F | WEIGHT: 152 LBS | HEIGHT: 71 IN

## 2023-04-13 PROCEDURE — 6360000002 HC RX W HCPCS: Performed by: EMERGENCY MEDICINE

## 2023-04-13 PROCEDURE — 96375 TX/PRO/DX INJ NEW DRUG ADDON: CPT

## 2023-04-13 RX ORDER — ONDANSETRON 2 MG/ML
8 INJECTION INTRAMUSCULAR; INTRAVENOUS ONCE
Status: COMPLETED | OUTPATIENT
Start: 2023-04-13 | End: 2023-04-13

## 2023-04-13 RX ADMIN — ONDANSETRON 8 MG: 2 INJECTION INTRAMUSCULAR; INTRAVENOUS at 03:53

## 2023-04-13 NOTE — ED PROVIDER NOTES
69-year-old male presents from nursing home with history of stroke, hypertension, hypothyroidism, cholecystectomy he is on daily aspirin, but takes it occasionally Depakote, Colace, famotidine nitroglycerin as needed anthony and Xarelto presents emerged department for concerns of coffee-ground emesis that started 45 minutes ago. This is the first time patient has ever had coffee-ground emesis. He is not on any proton pump inhibitors. He reports achy chest pain that started last night that has been constant on the left side of the chest nonradiating. He denies the following: Fever, chills, cough, abdominal pain, changes in bowel habits. He does report nausea vomiting  No chief complaint on file. Review of Systems   Pertinent review systems in HPI above  Physical Exam  Vitals reviewed. Constitutional:       General: He is not in acute distress. Appearance: He is not ill-appearing. HENT:      Head: Normocephalic. Right Ear: External ear normal.      Left Ear: External ear normal.      Nose: Nose normal.      Mouth/Throat:      Mouth: Mucous membranes are moist.   Eyes:      General:         Right eye: No discharge. Left eye: No discharge. Conjunctiva/sclera: Conjunctivae normal.   Cardiovascular:      Rate and Rhythm: Normal rate and regular rhythm. Heart sounds: No friction rub. No gallop. Pulmonary:      Effort: No respiratory distress. Breath sounds: No stridor. Abdominal:      General: There is no distension. Tenderness: There is no abdominal tenderness. There is no guarding or rebound. Genitourinary:     Comments: Patient has a chronic Devine catheter  Musculoskeletal:         General: No deformity or signs of injury. Cervical back: Normal range of motion and neck supple. No rigidity or tenderness. Skin:     Coloration: Skin is not jaundiced. Neurological:      Mental Status: He is alert. Sensory: No sensory deficit. Motor: No weakness.

## 2023-04-13 NOTE — ED NOTES
Received report from from [facility]; assumed care of pt at this time; resting in bed; awaiting physicians ambulance for ; will monitor     Gianluca Clarke RN  04/12/23 [id number]

## 2023-05-10 ENCOUNTER — OUTSIDE SERVICES (OUTPATIENT)
Dept: INTERNAL MEDICINE CLINIC | Age: 73
End: 2023-05-10
Payer: MEDICARE

## 2023-05-10 DIAGNOSIS — N40.1 BENIGN PROSTATIC HYPERPLASIA WITH LOWER URINARY TRACT SYMPTOMS, SYMPTOM DETAILS UNSPECIFIED: ICD-10-CM

## 2023-05-10 DIAGNOSIS — I50.30 DIASTOLIC CONGESTIVE HEART FAILURE, UNSPECIFIED HF CHRONICITY (HCC): ICD-10-CM

## 2023-05-10 DIAGNOSIS — L60.1 ONYCHOLYSIS: ICD-10-CM

## 2023-05-10 DIAGNOSIS — I69.351 HEMIPLGA FOLLOWING CEREBRAL INFRC AFF RIGHT DOMINANT SIDE (HCC): ICD-10-CM

## 2023-05-10 DIAGNOSIS — K59.00 CONSTIPATION, UNSPECIFIED CONSTIPATION TYPE: ICD-10-CM

## 2023-05-10 DIAGNOSIS — Z93.1 GASTROSTOMY STATUS (HCC): ICD-10-CM

## 2023-05-10 DIAGNOSIS — R47.1 DYSARTHRIA AND ANARTHRIA: ICD-10-CM

## 2023-05-10 DIAGNOSIS — I63.312 CEREBRAL INFARCTION DUE TO THROMBOSIS OF LEFT MIDDLE CEREBRAL ARTERY (HCC): ICD-10-CM

## 2023-05-10 DIAGNOSIS — N18.30 STAGE 3 CHRONIC KIDNEY DISEASE, UNSPECIFIED WHETHER STAGE 3A OR 3B CKD (HCC): ICD-10-CM

## 2023-05-10 DIAGNOSIS — K57.32 DVTRCLI OF LG INT W/O PERFORATION OR ABSCESS W/O BLEEDING: ICD-10-CM

## 2023-05-10 DIAGNOSIS — I25.10 ATHSCL HEART DISEASE OF NATIVE CORONARY ARTERY W/O ANG PCTRS: ICD-10-CM

## 2023-05-10 DIAGNOSIS — F25.1 SCHIZOAFFECTIVE DISORDER, DEPRESSIVE TYPE (HCC): ICD-10-CM

## 2023-05-10 DIAGNOSIS — R13.12 DYSPHAGIA, OROPHARYNGEAL PHASE: ICD-10-CM

## 2023-05-10 DIAGNOSIS — D64.9 ANEMIA, UNSPECIFIED TYPE: ICD-10-CM

## 2023-05-10 DIAGNOSIS — I69.391 DYSPHAGIA FOLLOWING CEREBRAL INFARCTION: Primary | ICD-10-CM

## 2023-05-10 DIAGNOSIS — I48.92 ATRIAL FLUTTER, UNSPECIFIED TYPE (HCC): ICD-10-CM

## 2023-05-10 DIAGNOSIS — E78.5 HYPERLIPIDEMIA, UNSPECIFIED HYPERLIPIDEMIA TYPE: ICD-10-CM

## 2023-05-10 DIAGNOSIS — Z74.1 NEED FOR ASSISTANCE WITH PERSONAL CARE: ICD-10-CM

## 2023-05-10 DIAGNOSIS — E44.1 MILD PROTEIN-CALORIE MALNUTRITION (HCC): ICD-10-CM

## 2023-05-10 DIAGNOSIS — I10 ESSENTIAL (PRIMARY) HYPERTENSION: ICD-10-CM

## 2023-05-10 PROCEDURE — 99309 SBSQ NF CARE MODERATE MDM 30: CPT | Performed by: INTERNAL MEDICINE

## 2023-05-26 VITALS
RESPIRATION RATE: 17 BRPM | WEIGHT: 152 LBS | TEMPERATURE: 98 F | OXYGEN SATURATION: 98 % | HEART RATE: 65 BPM | SYSTOLIC BLOOD PRESSURE: 151 MMHG | DIASTOLIC BLOOD PRESSURE: 87 MMHG | BODY MASS INDEX: 21.2 KG/M2

## 2023-05-26 ASSESSMENT — ENCOUNTER SYMPTOMS
DIARRHEA: 0
SHORTNESS OF BREATH: 0
NAUSEA: 0
ABDOMINAL PAIN: 0
VOMITING: 0

## 2023-06-14 ENCOUNTER — OUTSIDE SERVICES (OUTPATIENT)
Dept: INTERNAL MEDICINE CLINIC | Age: 73
End: 2023-06-14

## 2023-06-14 DIAGNOSIS — I48.92 ATRIAL FLUTTER, UNSPECIFIED TYPE (HCC): ICD-10-CM

## 2023-06-14 DIAGNOSIS — F25.1 SCHIZOAFFECTIVE DISORDER, DEPRESSIVE TYPE (HCC): ICD-10-CM

## 2023-06-14 DIAGNOSIS — I50.30 DIASTOLIC CONGESTIVE HEART FAILURE, UNSPECIFIED HF CHRONICITY (HCC): ICD-10-CM

## 2023-06-14 DIAGNOSIS — L60.1 ONYCHOLYSIS: ICD-10-CM

## 2023-06-14 DIAGNOSIS — K59.00 CONSTIPATION, UNSPECIFIED CONSTIPATION TYPE: ICD-10-CM

## 2023-06-14 DIAGNOSIS — E78.5 HYPERLIPIDEMIA, UNSPECIFIED HYPERLIPIDEMIA TYPE: ICD-10-CM

## 2023-06-14 DIAGNOSIS — I10 ESSENTIAL (PRIMARY) HYPERTENSION: ICD-10-CM

## 2023-06-14 DIAGNOSIS — N18.30 STAGE 3 CHRONIC KIDNEY DISEASE, UNSPECIFIED WHETHER STAGE 3A OR 3B CKD (HCC): ICD-10-CM

## 2023-06-14 DIAGNOSIS — N40.1 BENIGN PROSTATIC HYPERPLASIA WITH LOWER URINARY TRACT SYMPTOMS, SYMPTOM DETAILS UNSPECIFIED: ICD-10-CM

## 2023-06-14 DIAGNOSIS — I69.391 DYSPHAGIA FOLLOWING CEREBRAL INFARCTION: Primary | ICD-10-CM

## 2023-06-14 DIAGNOSIS — I69.351 HEMIPLGA FOLLOWING CEREBRAL INFRC AFF RIGHT DOMINANT SIDE (HCC): ICD-10-CM

## 2023-06-14 DIAGNOSIS — E44.1 MILD PROTEIN-CALORIE MALNUTRITION (HCC): ICD-10-CM

## 2023-06-14 DIAGNOSIS — R13.12 DYSPHAGIA, OROPHARYNGEAL PHASE: ICD-10-CM

## 2023-06-14 DIAGNOSIS — K57.32 DVTRCLI OF LG INT W/O PERFORATION OR ABSCESS W/O BLEEDING: ICD-10-CM

## 2023-06-14 DIAGNOSIS — R47.1 DYSARTHRIA AND ANARTHRIA: ICD-10-CM

## 2023-06-14 DIAGNOSIS — Z74.1 NEED FOR ASSISTANCE WITH PERSONAL CARE: ICD-10-CM

## 2023-06-14 DIAGNOSIS — Z93.1 GASTROSTOMY STATUS (HCC): ICD-10-CM

## 2023-06-14 DIAGNOSIS — D64.9 ANEMIA, UNSPECIFIED TYPE: ICD-10-CM

## 2023-06-14 DIAGNOSIS — I63.312 CEREBRAL INFARCTION DUE TO THROMBOSIS OF LEFT MIDDLE CEREBRAL ARTERY (HCC): ICD-10-CM

## 2023-06-14 DIAGNOSIS — I25.10 ATHSCL HEART DISEASE OF NATIVE CORONARY ARTERY W/O ANG PCTRS: ICD-10-CM

## 2023-06-21 VITALS
RESPIRATION RATE: 18 BRPM | WEIGHT: 156 LBS | OXYGEN SATURATION: 97 % | HEART RATE: 60 BPM | SYSTOLIC BLOOD PRESSURE: 106 MMHG | TEMPERATURE: 97.2 F | BODY MASS INDEX: 21.76 KG/M2 | DIASTOLIC BLOOD PRESSURE: 62 MMHG

## 2023-06-21 ASSESSMENT — ENCOUNTER SYMPTOMS
NAUSEA: 0
ABDOMINAL PAIN: 0
VOMITING: 0
SHORTNESS OF BREATH: 0
DIARRHEA: 0

## 2023-06-21 NOTE — PROGRESS NOTES
Visit Date: 06/14/2023 - [address]  1950  male 71 y.o. Subjective:    CC: Patient presents with no current complaints. This is a follow-up nursing home visit. HPI: Patient seen and examined. Medications on chart reviewed. Labs are reviewed. Discussed with nursing staff. ROS:  Review of Systems   Constitutional:  Negative for chills and fever. Respiratory:  Negative for shortness of breath. Cardiovascular:  Negative for chest pain. Gastrointestinal:  Negative for abdominal pain, diarrhea, nausea and vomiting. Genitourinary:  Negative for dysuria and frequency. Neurological:  Negative for dizziness and headaches. Current Meds: Refer to nursing home record    PMH:    Medical Problems: reviewed and updated       Diagnosis Date    Acquired hypothyroidism 03/09/2016    Acute renal failure (HCC)     Arthritis     Asthma     History of cardiovascular stress test 03/23/2016    lexiscan    Hypertension     Stroke (cerebrum) [facility])         Surgical Hx: reviewed and updated   Past Surgical History:   Procedure Laterality Date    CHOLECYSTECTOMY  7/17/2015    HERNIA REPAIR  2005    Dr Jose C Schofield         FH: reviewed and updated       Problem Relation Age of Onset    Heart Failure Mother     Heart Disease Mother         CABG    Heart Failure Father         SH: reviewed and updated    reports that he quit smoking about 12 years ago. He has quit using smokeless tobacco. He reports that he does not drink alcohol and does not use drugs. Objective:  /62   Pulse 60   Temp 97.2 °F (36.2 °C)   Resp 18   Wt 156 lb (70.8 kg)   SpO2 97%   BMI 21.76 kg/m²      Physical Exam  HENT:      Head: Normocephalic and atraumatic. Right Ear: Tympanic membrane normal.      Left Ear: Tympanic membrane normal.      Nose: Nose normal.   Eyes:      Extraocular Movements: Extraocular movements intact. Pupils: Pupils are equal, round, and reactive to light.    Cardiovascular:      Rate

## 2023-08-01 ENCOUNTER — OUTSIDE SERVICES (OUTPATIENT)
Dept: INTERNAL MEDICINE CLINIC | Age: 73
End: 2023-08-01

## 2023-08-01 DIAGNOSIS — Z93.1 GASTROSTOMY STATUS (HCC): ICD-10-CM

## 2023-08-01 DIAGNOSIS — I50.30 DIASTOLIC CONGESTIVE HEART FAILURE, UNSPECIFIED HF CHRONICITY (HCC): ICD-10-CM

## 2023-08-01 DIAGNOSIS — D64.9 ANEMIA, UNSPECIFIED TYPE: ICD-10-CM

## 2023-08-01 DIAGNOSIS — I25.10 ATHSCL HEART DISEASE OF NATIVE CORONARY ARTERY W/O ANG PCTRS: ICD-10-CM

## 2023-08-01 DIAGNOSIS — E44.1 MILD PROTEIN-CALORIE MALNUTRITION (HCC): ICD-10-CM

## 2023-08-01 DIAGNOSIS — I48.92 ATRIAL FLUTTER, UNSPECIFIED TYPE (HCC): ICD-10-CM

## 2023-08-01 DIAGNOSIS — F25.1 SCHIZOAFFECTIVE DISORDER, DEPRESSIVE TYPE (HCC): ICD-10-CM

## 2023-08-01 DIAGNOSIS — E78.5 HYPERLIPIDEMIA, UNSPECIFIED HYPERLIPIDEMIA TYPE: ICD-10-CM

## 2023-08-01 DIAGNOSIS — I69.391 DYSPHAGIA FOLLOWING CEREBRAL INFARCTION: Primary | ICD-10-CM

## 2023-08-01 DIAGNOSIS — N18.30 STAGE 3 CHRONIC KIDNEY DISEASE, UNSPECIFIED WHETHER STAGE 3A OR 3B CKD (HCC): ICD-10-CM

## 2023-08-01 DIAGNOSIS — R47.1 DYSARTHRIA AND ANARTHRIA: ICD-10-CM

## 2023-08-01 DIAGNOSIS — I63.312 CEREBRAL INFARCTION DUE TO THROMBOSIS OF LEFT MIDDLE CEREBRAL ARTERY (HCC): ICD-10-CM

## 2023-08-01 DIAGNOSIS — L60.1 ONYCHOLYSIS: ICD-10-CM

## 2023-08-01 DIAGNOSIS — I69.351 HEMIPLGA FOLLOWING CEREBRAL INFRC AFF RIGHT DOMINANT SIDE (HCC): ICD-10-CM

## 2023-08-01 DIAGNOSIS — R13.12 DYSPHAGIA, OROPHARYNGEAL PHASE: ICD-10-CM

## 2023-08-01 DIAGNOSIS — K57.32 DVTRCLI OF LG INT W/O PERFORATION OR ABSCESS W/O BLEEDING: ICD-10-CM

## 2023-08-01 DIAGNOSIS — Z74.1 NEED FOR ASSISTANCE WITH PERSONAL CARE: ICD-10-CM

## 2023-08-01 DIAGNOSIS — N40.1 BENIGN PROSTATIC HYPERPLASIA WITH LOWER URINARY TRACT SYMPTOMS, SYMPTOM DETAILS UNSPECIFIED: ICD-10-CM

## 2023-08-01 DIAGNOSIS — I10 ESSENTIAL (PRIMARY) HYPERTENSION: ICD-10-CM

## 2023-08-19 VITALS
RESPIRATION RATE: 18 BRPM | WEIGHT: 155 LBS | OXYGEN SATURATION: 94 % | SYSTOLIC BLOOD PRESSURE: 118 MMHG | BODY MASS INDEX: 21.62 KG/M2 | HEART RATE: 67 BPM | TEMPERATURE: 97.6 F | DIASTOLIC BLOOD PRESSURE: 68 MMHG

## 2023-08-19 ASSESSMENT — ENCOUNTER SYMPTOMS
SHORTNESS OF BREATH: 0
DIARRHEA: 0
ABDOMINAL PAIN: 0
VOMITING: 0
NAUSEA: 0

## 2023-09-05 ENCOUNTER — OUTSIDE SERVICES (OUTPATIENT)
Dept: INTERNAL MEDICINE CLINIC | Age: 73
End: 2023-09-05

## 2023-09-05 DIAGNOSIS — Z74.1 NEED FOR ASSISTANCE WITH PERSONAL CARE: ICD-10-CM

## 2023-09-05 DIAGNOSIS — I25.10 ATHSCL HEART DISEASE OF NATIVE CORONARY ARTERY W/O ANG PCTRS: ICD-10-CM

## 2023-09-05 DIAGNOSIS — R47.1 DYSARTHRIA AND ANARTHRIA: ICD-10-CM

## 2023-09-05 DIAGNOSIS — L60.1 ONYCHOLYSIS: ICD-10-CM

## 2023-09-05 DIAGNOSIS — N18.30 STAGE 3 CHRONIC KIDNEY DISEASE, UNSPECIFIED WHETHER STAGE 3A OR 3B CKD (HCC): ICD-10-CM

## 2023-09-05 DIAGNOSIS — I69.351 HEMIPLGA FOLLOWING CEREBRAL INFRC AFF RIGHT DOMINANT SIDE (HCC): ICD-10-CM

## 2023-09-05 DIAGNOSIS — I10 ESSENTIAL (PRIMARY) HYPERTENSION: ICD-10-CM

## 2023-09-05 DIAGNOSIS — I69.391 DYSPHAGIA FOLLOWING CEREBRAL INFARCTION: Primary | ICD-10-CM

## 2023-09-05 DIAGNOSIS — R13.12 DYSPHAGIA, OROPHARYNGEAL PHASE: ICD-10-CM

## 2023-09-05 DIAGNOSIS — E44.1 MILD PROTEIN-CALORIE MALNUTRITION (HCC): ICD-10-CM

## 2023-09-05 DIAGNOSIS — N40.1 BENIGN PROSTATIC HYPERPLASIA WITH LOWER URINARY TRACT SYMPTOMS, SYMPTOM DETAILS UNSPECIFIED: ICD-10-CM

## 2023-09-05 DIAGNOSIS — K57.32 DVTRCLI OF LG INT W/O PERFORATION OR ABSCESS W/O BLEEDING: ICD-10-CM

## 2023-09-05 DIAGNOSIS — I50.30 DIASTOLIC CONGESTIVE HEART FAILURE, UNSPECIFIED HF CHRONICITY (HCC): ICD-10-CM

## 2023-09-05 DIAGNOSIS — D64.9 ANEMIA, UNSPECIFIED TYPE: ICD-10-CM

## 2023-09-05 DIAGNOSIS — K59.00 CONSTIPATION, UNSPECIFIED CONSTIPATION TYPE: ICD-10-CM

## 2023-09-05 DIAGNOSIS — I63.312 CEREBRAL INFARCTION DUE TO THROMBOSIS OF LEFT MIDDLE CEREBRAL ARTERY (HCC): ICD-10-CM

## 2023-09-05 DIAGNOSIS — I48.92 ATRIAL FLUTTER, UNSPECIFIED TYPE (HCC): ICD-10-CM

## 2023-09-05 DIAGNOSIS — F25.1 SCHIZOAFFECTIVE DISORDER, DEPRESSIVE TYPE (HCC): ICD-10-CM

## 2023-09-05 DIAGNOSIS — E78.5 HYPERLIPIDEMIA, UNSPECIFIED HYPERLIPIDEMIA TYPE: ICD-10-CM

## 2023-09-05 DIAGNOSIS — Z93.1 GASTROSTOMY STATUS (HCC): ICD-10-CM

## 2023-09-18 VITALS
DIASTOLIC BLOOD PRESSURE: 76 MMHG | TEMPERATURE: 97.8 F | HEART RATE: 60 BPM | WEIGHT: 153.2 LBS | OXYGEN SATURATION: 95 % | SYSTOLIC BLOOD PRESSURE: 133 MMHG | RESPIRATION RATE: 16 BRPM | BODY MASS INDEX: 21.37 KG/M2

## 2023-09-18 RX ORDER — PANTOPRAZOLE SODIUM 40 MG/1
40 FOR SUSPENSION ORAL
COMMUNITY

## 2023-09-18 ASSESSMENT — ENCOUNTER SYMPTOMS
DIARRHEA: 0
VOMITING: 0
NAUSEA: 0
ABDOMINAL PAIN: 0
SHORTNESS OF BREATH: 0

## 2023-09-19 NOTE — PROGRESS NOTES
Visit Date: 09/05/2023 - [place]  1950  male 71 y.o. Subjective:    CC: Patient presents with no complaints at this time. This is a follow-up nursing home visit. HPI: Patient is seen and examined. Medications on chart reviewed. Labs are reviewed. Discussed with nursing staff. ROS:  Review of Systems   Constitutional:  Negative for chills and fever. Respiratory:  Negative for shortness of breath. Cardiovascular:  Negative for chest pain. Gastrointestinal:  Negative for abdominal pain, diarrhea, nausea and vomiting. Genitourinary:  Negative for dysuria and frequency. Neurological:  Negative for dizziness and headaches. Current Outpatient Medications   Medication Sig Dispense Refill    pantoprazole sodium (PROTONIX) 40 MG PACK packet 1 packet by Per G Tube route every morning (before breakfast)      carboxymethylcellulose 1 % ophthalmic solution Place 1 drop into both eyes 4 times daily      valproate (DEPAKENE) 250 MG/5ML solution 5 mLs by Per G Tube route 2 times daily      loperamide (IMODIUM) 2 MG capsule Take 2 capsules by mouth 4 times daily as needed for Diarrhea      famotidine (PEPCID) 20 MG tablet Take 1 tablet by mouth every morning      morphine sulfate 20 MG/ML concentrated oral solution Take 0.25 mLs by mouth every 4 hours as needed for Pain or Shortness of Breath.       acetaminophen (TYLENOL) 650 MG suppository Place 1 suppository rectally every 6 hours as needed for Fever or Pain      bisacodyl (DULCOLAX) 10 MG suppository Place 1 suppository rectally daily as needed for Constipation      hyoscyamine (ANASPAZ;LEVSIN) 125 MCG tablet 1 tablet by PEG Tube route every 6 hours as needed (as needed for terminal secretions)      guaiFENesin 400 MG tablet 1 tablet by PEG Tube route every morning      oxybutynin (DITROPAN) 5 MG tablet Take 1 tablet by mouth 3 times daily      furosemide (LASIX) 40 MG tablet Take 1 tablet by mouth every morning Indications:

## 2023-10-10 ENCOUNTER — OUTSIDE SERVICES (OUTPATIENT)
Dept: INTERNAL MEDICINE CLINIC | Age: 73
End: 2023-10-10
Payer: MEDICARE

## 2023-10-10 DIAGNOSIS — I50.30 DIASTOLIC CONGESTIVE HEART FAILURE, UNSPECIFIED HF CHRONICITY (HCC): ICD-10-CM

## 2023-10-10 DIAGNOSIS — K57.32 DVTRCLI OF LG INT W/O PERFORATION OR ABSCESS W/O BLEEDING: ICD-10-CM

## 2023-10-10 DIAGNOSIS — Z74.1 NEED FOR ASSISTANCE WITH PERSONAL CARE: ICD-10-CM

## 2023-10-10 DIAGNOSIS — N18.30 STAGE 3 CHRONIC KIDNEY DISEASE, UNSPECIFIED WHETHER STAGE 3A OR 3B CKD (HCC): ICD-10-CM

## 2023-10-10 DIAGNOSIS — F25.1 SCHIZOAFFECTIVE DISORDER, DEPRESSIVE TYPE (HCC): ICD-10-CM

## 2023-10-10 DIAGNOSIS — I48.92 ATRIAL FLUTTER, UNSPECIFIED TYPE (HCC): ICD-10-CM

## 2023-10-10 DIAGNOSIS — Z93.1 GASTROSTOMY STATUS (HCC): ICD-10-CM

## 2023-10-10 DIAGNOSIS — I25.10 ATHSCL HEART DISEASE OF NATIVE CORONARY ARTERY W/O ANG PCTRS: ICD-10-CM

## 2023-10-10 DIAGNOSIS — I69.351 HEMIPLGA FOLLOWING CEREBRAL INFRC AFF RIGHT DOMINANT SIDE (HCC): ICD-10-CM

## 2023-10-10 DIAGNOSIS — E44.1 MILD PROTEIN-CALORIE MALNUTRITION (HCC): ICD-10-CM

## 2023-10-10 DIAGNOSIS — N39.0 URINARY TRACT INFECTION WITHOUT HEMATURIA, SITE UNSPECIFIED: Primary | ICD-10-CM

## 2023-10-10 DIAGNOSIS — I69.391 DYSPHAGIA FOLLOWING CEREBRAL INFARCTION: ICD-10-CM

## 2023-10-10 PROCEDURE — 99309 SBSQ NF CARE MODERATE MDM 30: CPT | Performed by: INTERNAL MEDICINE

## 2023-11-14 ENCOUNTER — OUTSIDE SERVICES (OUTPATIENT)
Dept: INTERNAL MEDICINE CLINIC | Age: 73
End: 2023-11-14
Payer: MEDICARE

## 2023-11-14 VITALS
TEMPERATURE: 97.1 F | HEART RATE: 66 BPM | SYSTOLIC BLOOD PRESSURE: 132 MMHG | OXYGEN SATURATION: 96 % | DIASTOLIC BLOOD PRESSURE: 73 MMHG | BODY MASS INDEX: 21.76 KG/M2 | RESPIRATION RATE: 18 BRPM | WEIGHT: 156 LBS

## 2023-11-14 DIAGNOSIS — N39.0 URINARY TRACT INFECTION WITHOUT HEMATURIA, SITE UNSPECIFIED: Primary | ICD-10-CM

## 2023-11-14 DIAGNOSIS — Z93.1 GASTROSTOMY STATUS (HCC): ICD-10-CM

## 2023-11-14 DIAGNOSIS — I25.10 ATHSCL HEART DISEASE OF NATIVE CORONARY ARTERY W/O ANG PCTRS: ICD-10-CM

## 2023-11-14 DIAGNOSIS — I48.92 ATRIAL FLUTTER, UNSPECIFIED TYPE (HCC): ICD-10-CM

## 2023-11-14 DIAGNOSIS — I50.30 DIASTOLIC CONGESTIVE HEART FAILURE, UNSPECIFIED HF CHRONICITY (HCC): ICD-10-CM

## 2023-11-14 DIAGNOSIS — F25.1 SCHIZOAFFECTIVE DISORDER, DEPRESSIVE TYPE (HCC): ICD-10-CM

## 2023-11-14 DIAGNOSIS — I69.351 HEMIPLGA FOLLOWING CEREBRAL INFRC AFF RIGHT DOMINANT SIDE (HCC): ICD-10-CM

## 2023-11-14 DIAGNOSIS — K57.32 DVTRCLI OF LG INT W/O PERFORATION OR ABSCESS W/O BLEEDING: ICD-10-CM

## 2023-11-14 DIAGNOSIS — N40.1 BENIGN PROSTATIC HYPERPLASIA WITH LOWER URINARY TRACT SYMPTOMS, SYMPTOM DETAILS UNSPECIFIED: ICD-10-CM

## 2023-11-14 DIAGNOSIS — I69.391 DYSPHAGIA FOLLOWING CEREBRAL INFARCTION: ICD-10-CM

## 2023-11-14 DIAGNOSIS — D64.9 ANEMIA, UNSPECIFIED TYPE: ICD-10-CM

## 2023-11-14 DIAGNOSIS — I63.312 CEREBRAL INFARCTION DUE TO THROMBOSIS OF LEFT MIDDLE CEREBRAL ARTERY (HCC): ICD-10-CM

## 2023-11-14 DIAGNOSIS — N18.30 STAGE 3 CHRONIC KIDNEY DISEASE, UNSPECIFIED WHETHER STAGE 3A OR 3B CKD (HCC): ICD-10-CM

## 2023-11-14 PROCEDURE — 99309 SBSQ NF CARE MODERATE MDM 30: CPT | Performed by: INTERNAL MEDICINE

## 2023-11-14 ASSESSMENT — ENCOUNTER SYMPTOMS
ABDOMINAL PAIN: 0
SHORTNESS OF BREATH: 0
NAUSEA: 0
DIARRHEA: 0
VOMITING: 0

## 2023-12-30 VITALS
TEMPERATURE: 97.5 F | BODY MASS INDEX: 22.45 KG/M2 | SYSTOLIC BLOOD PRESSURE: 127 MMHG | OXYGEN SATURATION: 95 % | HEART RATE: 92 BPM | RESPIRATION RATE: 16 BRPM | DIASTOLIC BLOOD PRESSURE: 71 MMHG | WEIGHT: 161 LBS

## 2023-12-31 NOTE — PROGRESS NOTES
(HCC)  Creatinine stable.  7. Schizoaffective disorder, depressive type (HCC)  8. Need for assistance with personal care  9. 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 Atrial flutter, unspecified type (HCC)  11. Gastrostomy status (HCC)  12. Dysphagia, oropharyngeal phase  Continue with PEG tube feeding.  13. Benign prostatic hyperplasia with lower urinary tract symptoms, symptom details unspecified  14. 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 Dysarthria and anarthria  17. Constipation, unspecified constipation type  Continue current bowel regimen.  18. Onycholysis  19. Essential (primary) hypertension  Blood pressure well controlled.  Continue Lopressor 50 mg twice daily.  20. Hyperlipidemia, unspecified hyperlipidemia type  Continue atorvastatin 40 mg daily.    Continue current care.    Didi COSTELLO  am scribing for Dr. Awadalla Alonna Pratt I, Bahaa A Awadalla, MD, personally performed the services described in this documentation as scribed by Didi Meyer and it is both accurate and complete

## 2024-01-02 ENCOUNTER — OUTSIDE SERVICES (OUTPATIENT)
Dept: INTERNAL MEDICINE CLINIC | Age: 74
End: 2024-01-02

## 2024-01-02 DIAGNOSIS — N18.30 STAGE 3 CHRONIC KIDNEY DISEASE, UNSPECIFIED WHETHER STAGE 3A OR 3B CKD (HCC): ICD-10-CM

## 2024-01-02 DIAGNOSIS — I10 ESSENTIAL (PRIMARY) HYPERTENSION: ICD-10-CM

## 2024-01-02 DIAGNOSIS — N40.1 BENIGN PROSTATIC HYPERPLASIA WITH LOWER URINARY TRACT SYMPTOMS, SYMPTOM DETAILS UNSPECIFIED: ICD-10-CM

## 2024-01-02 DIAGNOSIS — I50.30 DIASTOLIC CONGESTIVE HEART FAILURE, UNSPECIFIED HF CHRONICITY (HCC): ICD-10-CM

## 2024-01-02 DIAGNOSIS — Z93.1 GASTROSTOMY STATUS (HCC): ICD-10-CM

## 2024-01-02 DIAGNOSIS — I48.92 ATRIAL FLUTTER, UNSPECIFIED TYPE (HCC): ICD-10-CM

## 2024-01-02 DIAGNOSIS — K57.32 DVTRCLI OF LG INT W/O PERFORATION OR ABSCESS W/O BLEEDING: ICD-10-CM

## 2024-01-02 DIAGNOSIS — R13.12 DYSPHAGIA, OROPHARYNGEAL PHASE: ICD-10-CM

## 2024-01-02 DIAGNOSIS — I25.10 ATHSCL HEART DISEASE OF NATIVE CORONARY ARTERY W/O ANG PCTRS: ICD-10-CM

## 2024-01-02 DIAGNOSIS — F25.1 SCHIZOAFFECTIVE DISORDER, DEPRESSIVE TYPE (HCC): ICD-10-CM

## 2024-01-02 DIAGNOSIS — I69.391 DYSPHAGIA FOLLOWING CEREBRAL INFARCTION: Primary | ICD-10-CM

## 2024-01-02 DIAGNOSIS — E44.1 MILD PROTEIN-CALORIE MALNUTRITION (HCC): ICD-10-CM

## 2024-01-02 DIAGNOSIS — D64.9 ANEMIA, UNSPECIFIED TYPE: ICD-10-CM

## 2024-01-10 VITALS
TEMPERATURE: 97.2 F | OXYGEN SATURATION: 96 % | SYSTOLIC BLOOD PRESSURE: 115 MMHG | HEART RATE: 70 BPM | DIASTOLIC BLOOD PRESSURE: 64 MMHG | BODY MASS INDEX: 22.45 KG/M2 | RESPIRATION RATE: 16 BRPM | WEIGHT: 161 LBS

## 2024-01-10 RX ORDER — BACLOFEN 5 MG/1
10 TABLET ORAL 2 TIMES DAILY
COMMUNITY

## 2024-01-10 ASSESSMENT — ENCOUNTER SYMPTOMS
NAUSEA: 0
DIARRHEA: 0
SHORTNESS OF BREATH: 0
VOMITING: 0
ABDOMINAL PAIN: 0

## 2024-01-10 NOTE — PROGRESS NOTES
Visit Date: 1/2/2024 - [facility] Eugenio Cuello [place]  1950  male 73 y.o.      Subjective:    CC: Patient presents with no complaints. This is a follow-up nursing home visit.     HPI: Patient seen and examined. Medications on chart reviewed. Labs are reviewed. Discussed with nursing staff.      ROS:  Review of Systems   Constitutional:  Negative for chills and fever.   Respiratory:  Negative for shortness of breath.    Cardiovascular:  Negative for chest pain.   Gastrointestinal:  Negative for abdominal pain, diarrhea, nausea and vomiting.   Genitourinary:  Negative for dysuria and frequency.   Musculoskeletal:  Positive for gait problem.   Neurological:  Positive for weakness. Negative for dizziness and headaches.        Current Meds: Refer to nursing home record    PMH:    Medical Problems: reviewed and updated       Diagnosis Date    Acquired hypothyroidism 03/09/2016    Acute renal failure (HCC)     Arthritis     Asthma     History of cardiovascular stress test 03/23/2016    lexiscan    Hypertension     Stroke (cerebrum) (HCC)         Surgical Hx: reviewed and updated   Past Surgical History:   Procedure Laterality Date    CHOLECYSTECTOMY  7/17/2015    HERNIA REPAIR  2005    Dr Perkins         FH: reviewed and updated       Problem Relation Age of Onset    Heart Failure Mother     Heart Disease Mother         CABG    Heart Failure Father         SH: reviewed and updated    reports that he quit smoking about 13 years ago. He has quit using smokeless tobacco. He reports that he does not drink alcohol and does not use drugs.     Objective:  /64   Pulse 70   Temp 97.2 °F (36.2 °C)   Resp 16   Wt 73 kg (161 lb)   SpO2 96%   BMI 22.45 kg/m²      Physical Exam  Constitutional:       General: He is awake. He is not in acute distress.     Appearance: Normal appearance.   HENT:      Head: Normocephalic and atraumatic.      Right Ear: External ear normal.      Left Ear: External ear normal.      Nose: Nose

## 2024-02-06 ENCOUNTER — OUTSIDE SERVICES (OUTPATIENT)
Dept: INTERNAL MEDICINE CLINIC | Age: 74
End: 2024-02-06

## 2024-02-06 DIAGNOSIS — N18.30 STAGE 3 CHRONIC KIDNEY DISEASE, UNSPECIFIED WHETHER STAGE 3A OR 3B CKD (HCC): ICD-10-CM

## 2024-02-06 DIAGNOSIS — I10 ESSENTIAL (PRIMARY) HYPERTENSION: ICD-10-CM

## 2024-02-06 DIAGNOSIS — I69.351 HEMIPLGA FOLLOWING CEREBRAL INFRC AFF RIGHT DOMINANT SIDE (HCC): ICD-10-CM

## 2024-02-06 DIAGNOSIS — Z93.1 GASTROSTOMY STATUS (HCC): ICD-10-CM

## 2024-02-06 DIAGNOSIS — N40.1 BENIGN PROSTATIC HYPERPLASIA WITH LOWER URINARY TRACT SYMPTOMS, SYMPTOM DETAILS UNSPECIFIED: ICD-10-CM

## 2024-02-06 DIAGNOSIS — I25.10 ATHSCL HEART DISEASE OF NATIVE CORONARY ARTERY W/O ANG PCTRS: ICD-10-CM

## 2024-02-06 DIAGNOSIS — D64.9 ANEMIA, UNSPECIFIED TYPE: Primary | ICD-10-CM

## 2024-02-06 DIAGNOSIS — Z74.1 NEED FOR ASSISTANCE WITH PERSONAL CARE: ICD-10-CM

## 2024-02-06 DIAGNOSIS — I50.30 DIASTOLIC CONGESTIVE HEART FAILURE, UNSPECIFIED HF CHRONICITY (HCC): ICD-10-CM

## 2024-02-06 DIAGNOSIS — F25.1 SCHIZOAFFECTIVE DISORDER, DEPRESSIVE TYPE (HCC): ICD-10-CM

## 2024-02-06 DIAGNOSIS — R13.12 DYSPHAGIA, OROPHARYNGEAL PHASE: ICD-10-CM

## 2024-02-06 DIAGNOSIS — K57.32 DVTRCLI OF LG INT W/O PERFORATION OR ABSCESS W/O BLEEDING: ICD-10-CM

## 2024-02-06 DIAGNOSIS — I48.92 ATRIAL FLUTTER, UNSPECIFIED TYPE (HCC): ICD-10-CM

## 2024-02-12 VITALS
HEART RATE: 67 BPM | BODY MASS INDEX: 23.43 KG/M2 | OXYGEN SATURATION: 96 % | DIASTOLIC BLOOD PRESSURE: 71 MMHG | RESPIRATION RATE: 16 BRPM | TEMPERATURE: 97.8 F | WEIGHT: 168 LBS | SYSTOLIC BLOOD PRESSURE: 123 MMHG

## 2024-02-13 NOTE — PROGRESS NOTES
ear normal.      Nose: Nose normal.   Eyes:      Extraocular Movements: Extraocular movements intact.      Pupils: Pupils are equal, round, and reactive to light.   Cardiovascular:      Rate and Rhythm: Normal rate and regular rhythm.      Heart sounds: S1 normal and S2 normal. No murmur heard.     No friction rub. No gallop.   Pulmonary:      Breath sounds: Examination of the right-lower field reveals decreased breath sounds. Examination of the left-lower field reveals decreased breath sounds. Decreased breath sounds present.   Chest:      Chest wall: No tenderness.   Abdominal:      General: Bowel sounds are normal. There is no distension.      Palpations: Abdomen is soft. There is no mass.      Tenderness: There is no abdominal tenderness.      Comments: No organomegaly  PEG tube in place   Genitourinary:     Comments: Devine catheter in place  Musculoskeletal:         General: No tenderness. Normal range of motion.      Cervical back: Neck supple. No tenderness.      Right lower leg: No edema.      Left lower leg: No edema.      Comments: No clubbing, No cyanosis   Skin:     General: Skin is warm and dry.   Neurological:      General: No focal deficit present.      Mental Status: He is alert. He is confused.      Cranial Nerves: Dysarthria (slight) present.      Motor: Weakness present.      Gait: Gait abnormal.      Comments: Some right-sided hemiparesis   Psychiatric:         Behavior: Behavior is cooperative.           Assessment & Plan:  1. Dysphagia following cerebral infarction  PEG tube in place  Well tolerated no reported issues  Continue with tube feeding at this time  2. Anemia, unspecified type  Hemoglobin stable  3. Dvtrcli of lg int w/o perforation or abscess w/o bleeding  4. Mild protein-calorie malnutrition (HCC)  Continue PEG tube feeding.  5. Diastolic congestive heart failure, unspecified HF chronicity (HCC)  Compensated   6. Stage 3 chronic kidney disease, unspecified whether stage 3a or 3b CKD

## 2024-02-22 ENCOUNTER — HOSPITAL ENCOUNTER (EMERGENCY)
Age: 74
Discharge: HOME | End: 2024-02-22
Attending: EMERGENCY MEDICINE
Payer: MEDICARE

## 2024-02-22 ENCOUNTER — APPOINTMENT (OUTPATIENT)
Dept: GENERAL RADIOLOGY | Age: 74
End: 2024-02-22
Payer: MEDICARE

## 2024-02-22 VITALS
TEMPERATURE: 98 F | OXYGEN SATURATION: 96 % | SYSTOLIC BLOOD PRESSURE: 132 MMHG | RESPIRATION RATE: 20 BRPM | DIASTOLIC BLOOD PRESSURE: 76 MMHG | BODY MASS INDEX: 23.43 KG/M2 | WEIGHT: 168 LBS | HEART RATE: 85 BPM

## 2024-02-22 DIAGNOSIS — K94.23 PEG TUBE MALFUNCTION (HCC): Primary | ICD-10-CM

## 2024-02-22 PROCEDURE — 99283 EMERGENCY DEPT VISIT LOW MDM: CPT

## 2024-02-22 PROCEDURE — 51702 INSERT TEMP BLADDER CATH: CPT

## 2024-02-22 PROCEDURE — 6360000004 HC RX CONTRAST MEDICATION: Performed by: EMERGENCY MEDICINE

## 2024-02-22 PROCEDURE — 74018 RADEX ABDOMEN 1 VIEW: CPT

## 2024-02-22 RX ADMIN — DIATRIZOATE MEGLUMINE AND DIATRIZOATE SODIUM 30 ML: 600; 100 SOLUTION ORAL; RECTAL at 16:39

## 2024-02-22 NOTE — ED PROVIDER NOTES
[facility] EMERGENCY DEPARTMENT  EMERGENCY DEPARTMENT ENCOUNTER        Pt Name: Cuate Stone  MRN: [id number]  Birthdate 1950  Date of evaluation: 2/22/2024  Provider: Rafi Longoria DO  PCP: Awadalla, Bahaa A, MD  Note Started: 2:38 PM EST 2/22/24    CHIEF COMPLAINT       Chief Complaint   Patient presents with    Feeding Tube Problem     Peg tube dislodged       HISTORY OF PRESENT ILLNESS: 1 or more Elements   History From: patient    Limitations to history : None    Cuate Stone is a 73 y.o. male who presents to the ED for evaluation of dislodged PEG tube.  Patient states that came out earlier this afternoon.  He is not sure what caused it to become dislodged.  Denies any abdominal pain.  States that he has had this PEG tube for several years.  He does not take anything orally.  States he takes all his medications and nutrition through the PEG tube.  He is not sure what size the PEG tube is.    Nursing Notes were all reviewed and agreed with or any disagreements were addressed in the HPI.      REVIEW OF EXTERNAL NOTE :         REVIEW OF SYSTEMS :           Positives and Pertinent negatives as per HPI.     SURGICAL HISTORY     Past Surgical History:   Procedure Laterality Date    CHOLECYSTECTOMY  7/17/2015    HERNIA REPAIR  2005    Dr Perkins        CURRENTMEDICATIONS       Previous Medications    ACETAMINOPHEN (TYLENOL) 325 MG TABLET    Take 2 tablets by mouth every 6 hours as needed for Pain or Fever    ACETAMINOPHEN (TYLENOL) 650 MG SUPPOSITORY    Place 1 suppository rectally every 6 hours as needed for Fever or Pain    ASPIRIN 81 MG CHEWABLE TABLET    81 mg by PEG Tube route daily    ATORVASTATIN (LIPITOR) 40 MG TABLET    40 mg by PEG Tube route daily    BACLOFEN (LIORESAL) 5 MG TABLET    2 tablets by PEG Tube route 2 times daily    BALSAM PERU-CASTOR OIL OINT    Apply 1 each topically 3 times daily    BISACODYL (DULCOLAX) 10 MG SUPPOSITORY    Place 1 suppository

## 2024-02-23 NOTE — ED NOTES
Pt report called to [facility] pt en route with physicians now with no IV access but newly placed PEG tube in place.

## 2024-02-23 NOTE — ED NOTES
Assisted onto bedpan for BM. Changed into new brief. New dressing applied to peg tube tube. Warm blankets and applied and pt comfortable in bed.

## 2024-03-05 ENCOUNTER — OUTSIDE SERVICES (OUTPATIENT)
Dept: INTERNAL MEDICINE CLINIC | Age: 74
End: 2024-03-05
Payer: MEDICARE

## 2024-03-05 DIAGNOSIS — I25.10 ATHSCL HEART DISEASE OF NATIVE CORONARY ARTERY W/O ANG PCTRS: ICD-10-CM

## 2024-03-05 DIAGNOSIS — Z74.1 NEED FOR ASSISTANCE WITH PERSONAL CARE: ICD-10-CM

## 2024-03-05 DIAGNOSIS — I69.391 DYSPHAGIA FOLLOWING CEREBRAL INFARCTION: Primary | ICD-10-CM

## 2024-03-05 DIAGNOSIS — N18.30 STAGE 3 CHRONIC KIDNEY DISEASE, UNSPECIFIED WHETHER STAGE 3A OR 3B CKD (HCC): ICD-10-CM

## 2024-03-05 DIAGNOSIS — D64.9 ANEMIA, UNSPECIFIED TYPE: ICD-10-CM

## 2024-03-05 DIAGNOSIS — I50.30 DIASTOLIC CONGESTIVE HEART FAILURE, UNSPECIFIED HF CHRONICITY (HCC): ICD-10-CM

## 2024-03-05 DIAGNOSIS — E44.1 MILD PROTEIN-CALORIE MALNUTRITION (HCC): ICD-10-CM

## 2024-03-05 DIAGNOSIS — I48.92 ATRIAL FLUTTER, UNSPECIFIED TYPE (HCC): ICD-10-CM

## 2024-03-05 DIAGNOSIS — I10 ESSENTIAL (PRIMARY) HYPERTENSION: ICD-10-CM

## 2024-03-05 DIAGNOSIS — N40.1 BENIGN PROSTATIC HYPERPLASIA WITH LOWER URINARY TRACT SYMPTOMS, SYMPTOM DETAILS UNSPECIFIED: ICD-10-CM

## 2024-03-05 DIAGNOSIS — I69.351 HEMIPLGA FOLLOWING CEREBRAL INFRC AFF RIGHT DOMINANT SIDE (HCC): ICD-10-CM

## 2024-03-05 DIAGNOSIS — F25.1 SCHIZOAFFECTIVE DISORDER, DEPRESSIVE TYPE (HCC): ICD-10-CM

## 2024-03-05 DIAGNOSIS — K57.32 DVTRCLI OF LG INT W/O PERFORATION OR ABSCESS W/O BLEEDING: ICD-10-CM

## 2024-03-05 PROCEDURE — 99309 SBSQ NF CARE MODERATE MDM 30: CPT | Performed by: INTERNAL MEDICINE

## 2024-03-18 VITALS
DIASTOLIC BLOOD PRESSURE: 78 MMHG | HEART RATE: 75 BPM | RESPIRATION RATE: 18 BRPM | WEIGHT: 168 LBS | TEMPERATURE: 97.8 F | OXYGEN SATURATION: 96 % | BODY MASS INDEX: 23.43 KG/M2 | SYSTOLIC BLOOD PRESSURE: 122 MMHG

## 2024-03-18 RX ORDER — POLYETHYLENE GLYCOL 3350 17 G/17G
17 POWDER, FOR SOLUTION ORAL 2 TIMES DAILY
COMMUNITY

## 2024-03-18 ASSESSMENT — ENCOUNTER SYMPTOMS
NAUSEA: 0
VOMITING: 0
DIARRHEA: 0
ABDOMINAL PAIN: 0
SHORTNESS OF BREATH: 0

## 2024-03-19 NOTE — PROGRESS NOTES
Visit Date: 3/5/2024 - [facility] Eugenio Cuello Bertha  1950  male 73 y.o.      Subjective:    CC: Patient presents with no complaints. This is a follow-up nursing home visit.   He states he feels not too bad but still asking when he can go home.     HPI: Patient seen and examined. Medications on chart reviewed. Labs are reviewed. Discussed with nursing staff.      ROS:  Review of Systems   Constitutional:  Negative for chills and fever.   Respiratory:  Negative for shortness of breath.    Cardiovascular:  Negative for chest pain.   Gastrointestinal:  Negative for abdominal pain, diarrhea, nausea and vomiting.   Genitourinary:  Negative for dysuria and frequency.   Musculoskeletal:  Positive for gait problem.   Neurological:  Positive for weakness. Negative for dizziness and headaches.        Current Meds: Refer to nursing home record    PMH:    Medical Problems: reviewed and updated       Diagnosis Date    Acquired hypothyroidism 03/09/2016    Acute renal failure (HCC)     Arthritis     Asthma     History of cardiovascular stress test 03/23/2016    lexiscan    Hypertension     Stroke (cerebrum) (HCC)         Surgical Hx: reviewed and updated   Past Surgical History:   Procedure Laterality Date    CHOLECYSTECTOMY  7/17/2015    HERNIA REPAIR  2005    Dr Perkins         FH: reviewed and updated       Problem Relation Age of Onset    Heart Failure Mother     Heart Disease Mother         CABG    Heart Failure Father         SH: reviewed and updated    reports that he quit smoking about 13 years ago. He has quit using smokeless tobacco. He reports that he does not drink alcohol and does not use drugs.     Objective:  /78   Pulse 75   Temp 97.8 °F (36.6 °C)   Resp 18   Wt 76.2 kg (168 lb)   SpO2 96%   BMI 23.43 kg/m²      Physical Exam  Constitutional:       General: He is awake. He is not in acute distress.     Appearance: Normal appearance.   HENT:      Head: Normocephalic and atraumatic.      Right Ear:

## 2024-04-09 ENCOUNTER — OUTSIDE SERVICES (OUTPATIENT)
Dept: INTERNAL MEDICINE CLINIC | Age: 74
End: 2024-04-09

## 2024-04-09 DIAGNOSIS — E44.1 MILD PROTEIN-CALORIE MALNUTRITION (HCC): ICD-10-CM

## 2024-04-09 DIAGNOSIS — I50.30 DIASTOLIC CONGESTIVE HEART FAILURE, UNSPECIFIED HF CHRONICITY (HCC): ICD-10-CM

## 2024-04-09 DIAGNOSIS — Z74.1 NEED FOR ASSISTANCE WITH PERSONAL CARE: ICD-10-CM

## 2024-04-09 DIAGNOSIS — K57.32 DVTRCLI OF LG INT W/O PERFORATION OR ABSCESS W/O BLEEDING: ICD-10-CM

## 2024-04-09 DIAGNOSIS — I25.10 ATHSCL HEART DISEASE OF NATIVE CORONARY ARTERY W/O ANG PCTRS: ICD-10-CM

## 2024-04-09 DIAGNOSIS — I10 ESSENTIAL (PRIMARY) HYPERTENSION: ICD-10-CM

## 2024-04-09 DIAGNOSIS — I48.92 ATRIAL FLUTTER, UNSPECIFIED TYPE (HCC): ICD-10-CM

## 2024-04-09 DIAGNOSIS — I69.391 DYSPHAGIA FOLLOWING CEREBRAL INFARCTION: Primary | ICD-10-CM

## 2024-04-09 DIAGNOSIS — N40.1 BENIGN PROSTATIC HYPERPLASIA WITH LOWER URINARY TRACT SYMPTOMS, SYMPTOM DETAILS UNSPECIFIED: ICD-10-CM

## 2024-04-09 DIAGNOSIS — N18.30 STAGE 3 CHRONIC KIDNEY DISEASE, UNSPECIFIED WHETHER STAGE 3A OR 3B CKD (HCC): ICD-10-CM

## 2024-04-09 DIAGNOSIS — I69.351 HEMIPLGA FOLLOWING CEREBRAL INFRC AFF RIGHT DOMINANT SIDE (HCC): ICD-10-CM

## 2024-04-09 DIAGNOSIS — D64.9 ANEMIA, UNSPECIFIED TYPE: ICD-10-CM

## 2024-05-06 VITALS
DIASTOLIC BLOOD PRESSURE: 84 MMHG | RESPIRATION RATE: 17 BRPM | HEART RATE: 68 BPM | BODY MASS INDEX: 23.71 KG/M2 | TEMPERATURE: 97.8 F | WEIGHT: 170 LBS | OXYGEN SATURATION: 96 % | SYSTOLIC BLOOD PRESSURE: 124 MMHG

## 2024-05-06 ASSESSMENT — ENCOUNTER SYMPTOMS
VOMITING: 0
ABDOMINAL PAIN: 0
NAUSEA: 0
SHORTNESS OF BREATH: 0
DIARRHEA: 0

## 2024-05-07 NOTE — PROGRESS NOTES
Visit Date: 4/9/2024 - [facility] Eugenio Cuello Bertha  1950  male 73 y.o.      Subjective:    CC: Patient presents with no complaints. This is a follow-up nursing home visit.   He states he feels not too bad but still asking when he can go home. He is more awake and alert on this visit.     HPI: Patient seen and examined. Medications on chart reviewed. Labs are reviewed. Discussed with nursing staff.      ROS:  Review of Systems   Constitutional:  Negative for chills and fever.   Respiratory:  Negative for shortness of breath.    Cardiovascular:  Negative for chest pain.   Gastrointestinal:  Negative for abdominal pain, diarrhea, nausea and vomiting.   Genitourinary:  Negative for dysuria and frequency.   Musculoskeletal:  Positive for gait problem.   Neurological:  Positive for weakness. Negative for dizziness and headaches.        Current Meds: Refer to nursing home record    PMH:    Medical Problems: reviewed and updated       Diagnosis Date    Acquired hypothyroidism 03/09/2016    Acute renal failure (HCC)     Arthritis     Asthma     History of cardiovascular stress test 03/23/2016    lexiscan    Hypertension     Stroke (cerebrum) (HCC)         Surgical Hx: reviewed and updated   Past Surgical History:   Procedure Laterality Date    CHOLECYSTECTOMY  7/17/2015    HERNIA REPAIR  2005    Dr Perkins         FH: reviewed and updated       Problem Relation Age of Onset    Heart Failure Mother     Heart Disease Mother         CABG    Heart Failure Father         SH: reviewed and updated    reports that he quit smoking about 13 years ago. He has quit using smokeless tobacco. He reports that he does not drink alcohol and does not use drugs.     Objective:  /84   Pulse 68   Temp 97.8 °F (36.6 °C)   Resp 17   Wt 77.1 kg (170 lb)   SpO2 96%   BMI 23.71 kg/m²      Physical Exam  Constitutional:       General: He is awake. He is not in acute distress.     Appearance: Normal appearance.   HENT:      Head:

## 2024-06-04 ENCOUNTER — OUTSIDE SERVICES (OUTPATIENT)
Dept: INTERNAL MEDICINE CLINIC | Age: 74
End: 2024-06-04

## 2024-06-04 DIAGNOSIS — Z86.73 HISTORY OF STROKE: Primary | ICD-10-CM

## 2024-06-04 ASSESSMENT — ENCOUNTER SYMPTOMS
DIARRHEA: 0
ABDOMINAL PAIN: 0
SHORTNESS OF BREATH: 0
NAUSEA: 0
VOMITING: 0

## 2024-06-04 NOTE — PROGRESS NOTES
Visit Date: 6/4/2024 - [facility] Eugenio Cuello Bertha  1950  male 73 y.o.      Subjective:    CC: Patient presents with no complaints. This is a follow-up nursing home visit.     HPI: Patient seen and examined. Medications on chart reviewed. Labs are reviewed. Discussed with nursing staff.      ROS:  Review of Systems   Constitutional:  Negative for chills and fever.   Respiratory:  Negative for shortness of breath.    Cardiovascular:  Negative for chest pain.   Gastrointestinal:  Negative for abdominal pain, diarrhea, nausea and vomiting.   Genitourinary:  Negative for dysuria and frequency.   Musculoskeletal:  Positive for gait problem.   Neurological:  Positive for weakness. Negative for dizziness and headaches.        Current Meds: Refer to nursing home record    PMH:    Medical Problems: reviewed and updated       Diagnosis Date    Acquired hypothyroidism 03/09/2016    Acute renal failure (HCC)     Arthritis     Asthma     History of cardiovascular stress test 03/23/2016    lexiscan    Hypertension     Stroke (cerebrum) (HCC)         Surgical Hx: reviewed and updated   Past Surgical History:   Procedure Laterality Date    CHOLECYSTECTOMY  7/17/2015    HERNIA REPAIR  2005    Dr Perkins         FH: reviewed and updated       Problem Relation Age of Onset    Heart Failure Mother     Heart Disease Mother         CABG    Heart Failure Father         SH: reviewed and updated    reports that he quit smoking about 13 years ago. He has quit using smokeless tobacco. He reports that he does not drink alcohol and does not use drugs.     Objective:  There were no vitals taken for this visit.     Physical Exam  Constitutional:       General: He is awake. He is not in acute distress.     Appearance: Normal appearance.   HENT:      Head: Normocephalic and atraumatic.      Right Ear: External ear normal.      Left Ear: External ear normal.      Nose: Nose normal.   Eyes:      Extraocular Movements: Extraocular movements intact.

## 2024-07-09 ENCOUNTER — OUTSIDE SERVICES (OUTPATIENT)
Dept: INTERNAL MEDICINE CLINIC | Age: 74
End: 2024-07-09

## 2024-07-09 DIAGNOSIS — N18.30 STAGE 3 CHRONIC KIDNEY DISEASE, UNSPECIFIED WHETHER STAGE 3A OR 3B CKD (HCC): ICD-10-CM

## 2024-07-09 DIAGNOSIS — Z86.73 HISTORY OF STROKE: Primary | ICD-10-CM

## 2024-07-09 DIAGNOSIS — I50.30 DIASTOLIC CONGESTIVE HEART FAILURE, UNSPECIFIED HF CHRONICITY (HCC): ICD-10-CM

## 2024-07-09 DIAGNOSIS — D64.9 ANEMIA, UNSPECIFIED TYPE: ICD-10-CM

## 2024-07-09 DIAGNOSIS — I10 ESSENTIAL (PRIMARY) HYPERTENSION: ICD-10-CM

## 2024-07-09 DIAGNOSIS — N40.1 BENIGN PROSTATIC HYPERPLASIA WITH LOWER URINARY TRACT SYMPTOMS, SYMPTOM DETAILS UNSPECIFIED: ICD-10-CM

## 2024-07-09 DIAGNOSIS — I48.92 ATRIAL FLUTTER, UNSPECIFIED TYPE (HCC): ICD-10-CM

## 2024-07-09 DIAGNOSIS — K57.32 DVTRCLI OF LG INT W/O PERFORATION OR ABSCESS W/O BLEEDING: ICD-10-CM

## 2024-07-09 DIAGNOSIS — E44.1 MILD PROTEIN-CALORIE MALNUTRITION (HCC): ICD-10-CM

## 2024-07-09 DIAGNOSIS — I69.391 DYSPHAGIA FOLLOWING CEREBRAL INFARCTION: ICD-10-CM

## 2024-07-09 DIAGNOSIS — I25.10 ATHSCL HEART DISEASE OF NATIVE CORONARY ARTERY W/O ANG PCTRS: ICD-10-CM

## 2024-07-09 ASSESSMENT — ENCOUNTER SYMPTOMS
SHORTNESS OF BREATH: 0
DIARRHEA: 0
ABDOMINAL PAIN: 0
NAUSEA: 0
VOMITING: 0

## 2024-07-09 NOTE — PROGRESS NOTES
Visit Date: 7/9/2024 - [facility] Eugenio Cuello Bertha  1950  male 73 y.o.      Subjective:    CC: Patient presents with no complaints. This is a follow-up nursing home visit.   Tolerating tube feeds    HPI: Patient seen and examined. Medications on chart reviewed. Labs are reviewed. Discussed with nursing staff.  No significant changes since last visit      ROS:  Review of Systems   Constitutional:  Negative for chills and fever.   Respiratory:  Negative for shortness of breath.    Cardiovascular:  Negative for chest pain.   Gastrointestinal:  Negative for abdominal pain, diarrhea, nausea and vomiting.   Genitourinary:  Negative for dysuria and frequency.   Musculoskeletal:  Positive for gait problem.   Neurological:  Positive for weakness. Negative for dizziness and headaches.        Current Meds: Refer to nursing home record    PMH:    Medical Problems: reviewed and updated       Diagnosis Date    Acquired hypothyroidism 03/09/2016    Acute renal failure (HCC)     Arthritis     Asthma     History of cardiovascular stress test 03/23/2016    lexiscan    Hypertension     Stroke (cerebrum) (HCC)         Surgical Hx: reviewed and updated   Past Surgical History:   Procedure Laterality Date    CHOLECYSTECTOMY  7/17/2015    HERNIA REPAIR  2005    Dr Perkins         FH: reviewed and updated       Problem Relation Age of Onset    Heart Failure Mother     Heart Disease Mother         CABG    Heart Failure Father         SH: reviewed and updated    reports that he quit smoking about 13 years ago. He has quit using smokeless tobacco. He reports that he does not drink alcohol and does not use drugs.     Objective:  Vitals reviewed, please refer to Nursing home chart     Physical Exam  Constitutional:       General: He is awake. He is not in acute distress.     Appearance: He is ill-appearing.   HENT:      Head: Normocephalic and atraumatic.      Right Ear: External ear normal.      Left Ear: External ear normal.      Nose:

## 2024-09-24 ENCOUNTER — OUTSIDE SERVICES (OUTPATIENT)
Dept: INTERNAL MEDICINE CLINIC | Age: 74
End: 2024-09-24

## 2024-09-24 DIAGNOSIS — K57.32 DVTRCLI OF LG INT W/O PERFORATION OR ABSCESS W/O BLEEDING: ICD-10-CM

## 2024-09-24 DIAGNOSIS — I69.391 DYSPHAGIA FOLLOWING CEREBRAL INFARCTION: ICD-10-CM

## 2024-09-24 DIAGNOSIS — E44.1 MILD PROTEIN-CALORIE MALNUTRITION (HCC): ICD-10-CM

## 2024-09-24 DIAGNOSIS — N18.30 STAGE 3 CHRONIC KIDNEY DISEASE, UNSPECIFIED WHETHER STAGE 3A OR 3B CKD (HCC): ICD-10-CM

## 2024-09-24 DIAGNOSIS — Z86.73 HISTORY OF STROKE: Primary | ICD-10-CM

## 2024-09-24 DIAGNOSIS — I48.92 ATRIAL FLUTTER, UNSPECIFIED TYPE (HCC): ICD-10-CM

## 2024-09-24 DIAGNOSIS — N40.1 BENIGN PROSTATIC HYPERPLASIA WITH LOWER URINARY TRACT SYMPTOMS, SYMPTOM DETAILS UNSPECIFIED: ICD-10-CM

## 2024-09-24 DIAGNOSIS — D64.9 ANEMIA, UNSPECIFIED TYPE: ICD-10-CM

## 2024-09-24 DIAGNOSIS — I50.30 DIASTOLIC CONGESTIVE HEART FAILURE, UNSPECIFIED HF CHRONICITY (HCC): ICD-10-CM

## 2024-09-24 DIAGNOSIS — I25.10 ATHSCL HEART DISEASE OF NATIVE CORONARY ARTERY W/O ANG PCTRS: ICD-10-CM

## 2024-09-24 DIAGNOSIS — I10 ESSENTIAL (PRIMARY) HYPERTENSION: ICD-10-CM

## 2024-09-24 ASSESSMENT — ENCOUNTER SYMPTOMS
ABDOMINAL PAIN: 0
DIARRHEA: 0
NAUSEA: 0
VOMITING: 0
SHORTNESS OF BREATH: 0

## 2024-09-29 ENCOUNTER — HOSPITAL ENCOUNTER (INPATIENT)
Age: 74
LOS: 4 days | Discharge: HOME | DRG: 698 | End: 2024-10-03
Attending: STUDENT IN AN ORGANIZED HEALTH CARE EDUCATION/TRAINING PROGRAM | Admitting: INTERNAL MEDICINE
Payer: MEDICARE

## 2024-09-29 ENCOUNTER — APPOINTMENT (OUTPATIENT)
Dept: GENERAL RADIOLOGY | Age: 74
DRG: 698 | End: 2024-09-29
Payer: MEDICARE

## 2024-09-29 ENCOUNTER — APPOINTMENT (OUTPATIENT)
Dept: CT IMAGING | Age: 74
DRG: 698 | End: 2024-09-29
Payer: MEDICARE

## 2024-09-29 ENCOUNTER — HOSPITAL ENCOUNTER (EMERGENCY)
Age: 74
Discharge: HOME | DRG: 698 | End: 2024-09-29
Attending: STUDENT IN AN ORGANIZED HEALTH CARE EDUCATION/TRAINING PROGRAM
Payer: MEDICARE

## 2024-09-29 VITALS
DIASTOLIC BLOOD PRESSURE: 92 MMHG | SYSTOLIC BLOOD PRESSURE: 123 MMHG | OXYGEN SATURATION: 97 % | RESPIRATION RATE: 20 BRPM | TEMPERATURE: 99.5 F | HEART RATE: 100 BPM

## 2024-09-29 DIAGNOSIS — I48.91 ATRIAL FIBRILLATION WITH RAPID VENTRICULAR RESPONSE (HCC): ICD-10-CM

## 2024-09-29 DIAGNOSIS — K92.2 GASTROINTESTINAL HEMORRHAGE, UNSPECIFIED GASTROINTESTINAL HEMORRHAGE TYPE: ICD-10-CM

## 2024-09-29 DIAGNOSIS — R57.9 SHOCK: ICD-10-CM

## 2024-09-29 DIAGNOSIS — K92.0 COFFEE GROUND EMESIS: Primary | ICD-10-CM

## 2024-09-29 DIAGNOSIS — J96.02 ACUTE RESPIRATORY FAILURE WITH HYPOXIA AND HYPERCAPNIA: Primary | ICD-10-CM

## 2024-09-29 DIAGNOSIS — J96.01 ACUTE RESPIRATORY FAILURE WITH HYPOXIA AND HYPERCAPNIA: Primary | ICD-10-CM

## 2024-09-29 DIAGNOSIS — K92.0 COFFEE GROUND EMESIS: ICD-10-CM

## 2024-09-29 DIAGNOSIS — R06.03 RESPIRATORY DISTRESS: ICD-10-CM

## 2024-09-29 LAB
ALBUMIN SERPL-MCNC: 3.2 G/DL (ref 3.5–5.2)
ALP SERPL-CCNC: 98 U/L (ref 40–129)
ALT SERPL-CCNC: 10 U/L (ref 0–40)
ANION GAP SERPL CALCULATED.3IONS-SCNC: 17 MMOL/L (ref 7–16)
AST SERPL-CCNC: 26 U/L (ref 0–39)
B PARAP IS1001 DNA NPH QL NAA+NON-PROBE: NOT DETECTED
B PERT DNA SPEC QL NAA+PROBE: NOT DETECTED
BACTERIA URNS QL MICRO: ABNORMAL
BASOPHILS # BLD: 0.05 K/UL (ref 0–0.2)
BASOPHILS NFR BLD: 0 % (ref 0–2)
BILIRUB SERPL-MCNC: 0.9 MG/DL (ref 0–1.2)
BILIRUB UR QL STRIP: NEGATIVE
BNP SERPL-MCNC: 2619 PG/ML (ref 0–450)
BUN SERPL-MCNC: 60 MG/DL (ref 6–23)
C PNEUM DNA NPH QL NAA+NON-PROBE: NOT DETECTED
CALCIUM SERPL-MCNC: 9.2 MG/DL (ref 8.6–10.2)
CHLORIDE SERPL-SCNC: 105 MMOL/L (ref 98–107)
CLARITY UR: ABNORMAL
CO2 SERPL-SCNC: 19 MMOL/L (ref 22–29)
COLOR UR: YELLOW
CREAT SERPL-MCNC: 1.6 MG/DL (ref 0.7–1.2)
CRITICAL ACTION: NORMAL
CRITICAL NOTIFICATION DATE/TIME: NORMAL
CRITICAL NOTIFICATION: NORMAL
CRITICAL VALUE READ BACK: YES
EOSINOPHIL # BLD: 0.01 K/UL (ref 0.05–0.5)
EOSINOPHILS RELATIVE PERCENT: 0 % (ref 0–6)
EPI CELLS #/AREA URNS HPF: ABNORMAL /HPF
ERYTHROCYTE [DISTWIDTH] IN BLOOD BY AUTOMATED COUNT: 13.8 % (ref 11.5–15)
FIO2: 100
FLUAV RNA NPH QL NAA+NON-PROBE: NOT DETECTED
FLUBV RNA NPH QL NAA+NON-PROBE: NOT DETECTED
GFR, ESTIMATED: 47 ML/MIN/1.73M2
GLUCOSE BLD-MCNC: 215 MG/DL (ref 74–99)
GLUCOSE SERPL-MCNC: 235 MG/DL (ref 74–99)
GLUCOSE UR STRIP-MCNC: NEGATIVE MG/DL
HADV DNA NPH QL NAA+NON-PROBE: NOT DETECTED
HCOV 229E RNA NPH QL NAA+NON-PROBE: NOT DETECTED
HCOV HKU1 RNA NPH QL NAA+NON-PROBE: NOT DETECTED
HCOV NL63 RNA NPH QL NAA+NON-PROBE: NOT DETECTED
HCOV OC43 RNA NPH QL NAA+NON-PROBE: NOT DETECTED
HCT VFR BLD AUTO: 45.7 % (ref 37–54)
HGB BLD-MCNC: 14.2 G/DL (ref 12.5–16.5)
HGB UR QL STRIP.AUTO: ABNORMAL
HMPV RNA NPH QL NAA+NON-PROBE: NOT DETECTED
HPIV1 RNA NPH QL NAA+NON-PROBE: NOT DETECTED
HPIV2 RNA NPH QL NAA+NON-PROBE: NOT DETECTED
HPIV3 RNA NPH QL NAA+NON-PROBE: NOT DETECTED
HPIV4 RNA NPH QL NAA+NON-PROBE: NOT DETECTED
IMM GRANULOCYTES # BLD AUTO: 0.12 K/UL (ref 0–0.58)
IMM GRANULOCYTES NFR BLD: 1 % (ref 0–5)
INR PPP: 1.2
KETONES UR STRIP-MCNC: NEGATIVE MG/DL
LACTATE BLDV-SCNC: 3.9 MMOL/L (ref 0.5–1.9)
LACTATE BLDV-SCNC: 6.8 MMOL/L (ref 0.5–1.9)
LEUKOCYTE ESTERASE UR QL STRIP: ABNORMAL
LIPASE SERPL-CCNC: 80 U/L (ref 13–60)
LYMPHOCYTES NFR BLD: 1.71 K/UL (ref 1.5–4)
LYMPHOCYTES RELATIVE PERCENT: 12 % (ref 20–42)
M PNEUMO DNA NPH QL NAA+NON-PROBE: NOT DETECTED
MAGNESIUM SERPL-MCNC: 2.8 MG/DL (ref 1.6–2.6)
MCH RBC QN AUTO: 30.4 PG (ref 26–35)
MCHC RBC AUTO-ENTMCNC: 31.1 G/DL (ref 32–34.5)
MCV RBC AUTO: 97.9 FL (ref 80–99.9)
MODE: AC
MONOCYTES NFR BLD: 1.05 K/UL (ref 0.1–0.95)
MONOCYTES NFR BLD: 7 % (ref 2–12)
NEGATIVE BASE EXCESS, ART: 8.9 MMOL/L
NEUTROPHILS NFR BLD: 80 % (ref 43–80)
NEUTS SEG NFR BLD: 11.66 K/UL (ref 1.8–7.3)
NITRITE UR QL STRIP: NEGATIVE
O2 DELIVERY DEVICE: ABNORMAL
PARTIAL THROMBOPLASTIN TIME: 27.7 SEC (ref 24.5–35.1)
PEEP: 5
PH UR STRIP: 7 [PH] (ref 5–9)
PLATELET, FLUORESCENCE: 235 K/UL (ref 130–450)
PMV BLD AUTO: 14.2 FL (ref 7–12)
POC HCO3: 19.6 MMOL/L (ref 22–26)
POC O2 SATURATION: 99.8 % (ref 92–98.5)
POC PCO2: 50.9 MM HG (ref 35–45)
POC PH: 7.19 (ref 7.35–7.45)
POC PO2: 308 MM HG (ref 60–80)
POTASSIUM SERPL-SCNC: 4.6 MMOL/L (ref 3.5–5)
PROCALCITONIN SERPL-MCNC: 0.16 NG/ML (ref 0–0.08)
PROT SERPL-MCNC: 7.7 G/DL (ref 6.4–8.3)
PROT UR STRIP-MCNC: >=300 MG/DL
PROTHROMBIN TIME: 12.5 SEC (ref 9.3–12.4)
RBC # BLD AUTO: 4.67 M/UL (ref 3.8–5.8)
RBC #/AREA URNS HPF: ABNORMAL /HPF
RSV RNA NPH QL NAA+NON-PROBE: NOT DETECTED
RV+EV RNA NPH QL NAA+NON-PROBE: NOT DETECTED
SARS-COV-2 RDRP RESP QL NAA+PROBE: NOT DETECTED
SARS-COV-2 RNA NPH QL NAA+NON-PROBE: NOT DETECTED
SET RATE, POC: 16
SODIUM SERPL-SCNC: 141 MMOL/L (ref 132–146)
SP GR UR STRIP: 1.02 (ref 1–1.03)
SPECIMEN DESCRIPTION: NORMAL
SPECIMEN DESCRIPTION: NORMAL
TIDAL VOLUME: 450
TROPONIN I SERPL HS-MCNC: 35 NG/L (ref 0–11)
TROPONIN I SERPL HS-MCNC: 51 NG/L (ref 0–11)
TROPONIN I SERPL HS-MCNC: 61 NG/L (ref 0–11)
UROBILINOGEN UR STRIP-ACNC: 0.2 EU/DL (ref 0–1)
WBC #/AREA URNS HPF: ABNORMAL /HPF
WBC OTHER # BLD: 14.6 K/UL (ref 4.5–11.5)

## 2024-09-29 PROCEDURE — 82803 BLOOD GASES ANY COMBINATION: CPT

## 2024-09-29 PROCEDURE — 83735 ASSAY OF MAGNESIUM: CPT

## 2024-09-29 PROCEDURE — 85610 PROTHROMBIN TIME: CPT

## 2024-09-29 PROCEDURE — 82962 GLUCOSE BLOOD TEST: CPT

## 2024-09-29 PROCEDURE — 6370000000 HC RX 637 (ALT 250 FOR IP): Performed by: STUDENT IN AN ORGANIZED HEALTH CARE EDUCATION/TRAINING PROGRAM

## 2024-09-29 PROCEDURE — 86900 BLOOD TYPING SEROLOGIC ABO: CPT

## 2024-09-29 PROCEDURE — 87040 BLOOD CULTURE FOR BACTERIA: CPT

## 2024-09-29 PROCEDURE — 99291 CRITICAL CARE FIRST HOUR: CPT | Performed by: INTERNAL MEDICINE

## 2024-09-29 PROCEDURE — 85730 THROMBOPLASTIN TIME PARTIAL: CPT

## 2024-09-29 PROCEDURE — 96375 TX/PRO/DX INJ NEW DRUG ADDON: CPT

## 2024-09-29 PROCEDURE — 84484 ASSAY OF TROPONIN QUANT: CPT

## 2024-09-29 PROCEDURE — 87635 SARS-COV-2 COVID-19 AMP PRB: CPT

## 2024-09-29 PROCEDURE — 83605 ASSAY OF LACTIC ACID: CPT

## 2024-09-29 PROCEDURE — P9016 RBC LEUKOCYTES REDUCED: HCPCS

## 2024-09-29 PROCEDURE — 31500 INSERT EMERGENCY AIRWAY: CPT

## 2024-09-29 PROCEDURE — 2580000003 HC RX 258

## 2024-09-29 PROCEDURE — 2500000003 HC RX 250 WO HCPCS

## 2024-09-29 PROCEDURE — 6360000002 HC RX W HCPCS

## 2024-09-29 PROCEDURE — 96372 THER/PROPH/DIAG INJ SC/IM: CPT

## 2024-09-29 PROCEDURE — 6360000004 HC RX CONTRAST MEDICATION: Performed by: RADIOLOGY

## 2024-09-29 PROCEDURE — 83880 ASSAY OF NATRIURETIC PEPTIDE: CPT

## 2024-09-29 PROCEDURE — 71045 X-RAY EXAM CHEST 1 VIEW: CPT

## 2024-09-29 PROCEDURE — 85025 COMPLETE CBC W/AUTO DIFF WBC: CPT

## 2024-09-29 PROCEDURE — 87154 CUL TYP ID BLD PTHGN 6+ TRGT: CPT

## 2024-09-29 PROCEDURE — 84145 PROCALCITONIN (PCT): CPT

## 2024-09-29 PROCEDURE — 83690 ASSAY OF LIPASE: CPT

## 2024-09-29 PROCEDURE — 6360000002 HC RX W HCPCS: Performed by: STUDENT IN AN ORGANIZED HEALTH CARE EDUCATION/TRAINING PROGRAM

## 2024-09-29 PROCEDURE — 36556 INSERT NON-TUNNEL CV CATH: CPT

## 2024-09-29 PROCEDURE — 86901 BLOOD TYPING SEROLOGIC RH(D): CPT

## 2024-09-29 PROCEDURE — 99285 EMERGENCY DEPT VISIT HI MDM: CPT

## 2024-09-29 PROCEDURE — 71275 CT ANGIOGRAPHY CHEST: CPT

## 2024-09-29 PROCEDURE — 74177 CT ABD & PELVIS W/CONTRAST: CPT

## 2024-09-29 PROCEDURE — 99284 EMERGENCY DEPT VISIT MOD MDM: CPT

## 2024-09-29 PROCEDURE — 86922 COMPATIBILITY TEST ANTIGLOB: CPT

## 2024-09-29 PROCEDURE — 86850 RBC ANTIBODY SCREEN: CPT

## 2024-09-29 PROCEDURE — 06HY33Z INSERTION OF INFUSION DEVICE INTO LOWER VEIN, PERCUTANEOUS APPROACH: ICD-10-PCS | Performed by: INTERNAL MEDICINE

## 2024-09-29 PROCEDURE — 86920 COMPATIBILITY TEST SPIN: CPT

## 2024-09-29 PROCEDURE — 2580000003 HC RX 258: Performed by: STUDENT IN AN ORGANIZED HEALTH CARE EDUCATION/TRAINING PROGRAM

## 2024-09-29 PROCEDURE — 0202U NFCT DS 22 TRGT SARS-COV-2: CPT

## 2024-09-29 PROCEDURE — 94002 VENT MGMT INPAT INIT DAY: CPT

## 2024-09-29 PROCEDURE — 0BH17EZ INSERTION OF ENDOTRACHEAL AIRWAY INTO TRACHEA, VIA NATURAL OR ARTIFICIAL OPENING: ICD-10-PCS | Performed by: STUDENT IN AN ORGANIZED HEALTH CARE EDUCATION/TRAINING PROGRAM

## 2024-09-29 PROCEDURE — 96365 THER/PROPH/DIAG IV INF INIT: CPT

## 2024-09-29 PROCEDURE — 93005 ELECTROCARDIOGRAM TRACING: CPT | Performed by: STUDENT IN AN ORGANIZED HEALTH CARE EDUCATION/TRAINING PROGRAM

## 2024-09-29 PROCEDURE — 2000000000 HC ICU R&B

## 2024-09-29 PROCEDURE — 3E033XZ INTRODUCTION OF VASOPRESSOR INTO PERIPHERAL VEIN, PERCUTANEOUS APPROACH: ICD-10-PCS | Performed by: INTERNAL MEDICINE

## 2024-09-29 PROCEDURE — 96366 THER/PROPH/DIAG IV INF ADDON: CPT

## 2024-09-29 PROCEDURE — 93005 ELECTROCARDIOGRAM TRACING: CPT

## 2024-09-29 PROCEDURE — 2500000003 HC RX 250 WO HCPCS: Performed by: STUDENT IN AN ORGANIZED HEALTH CARE EDUCATION/TRAINING PROGRAM

## 2024-09-29 PROCEDURE — 81001 URINALYSIS AUTO W/SCOPE: CPT

## 2024-09-29 PROCEDURE — 80053 COMPREHEN METABOLIC PANEL: CPT

## 2024-09-29 PROCEDURE — 5A1935Z RESPIRATORY VENTILATION, LESS THAN 24 CONSECUTIVE HOURS: ICD-10-PCS | Performed by: STUDENT IN AN ORGANIZED HEALTH CARE EDUCATION/TRAINING PROGRAM

## 2024-09-29 PROCEDURE — 87077 CULTURE AEROBIC IDENTIFY: CPT

## 2024-09-29 RX ORDER — EPINEPHRINE IN SOD CHLOR,ISO 1 MG/10 ML
0.03 SYRINGE (ML) INTRAVENOUS ONCE
Status: COMPLETED | OUTPATIENT
Start: 2024-09-29 | End: 2024-09-29

## 2024-09-29 RX ORDER — LANSOPRAZOLE
30 KIT ONCE
Status: DISCONTINUED | OUTPATIENT
Start: 2024-09-29 | End: 2024-09-29

## 2024-09-29 RX ORDER — FENTANYL CITRATE 50 UG/ML
INJECTION, SOLUTION INTRAMUSCULAR; INTRAVENOUS
Status: COMPLETED
Start: 2024-09-29 | End: 2024-09-29

## 2024-09-29 RX ORDER — SODIUM CHLORIDE 9 MG/ML
INJECTION, SOLUTION INTRAVENOUS
Status: DISPENSED
Start: 2024-09-29 | End: 2024-09-30

## 2024-09-29 RX ORDER — PROPOFOL 10 MG/ML
5-50 INJECTION, EMULSION INTRAVENOUS CONTINUOUS
Status: DISCONTINUED | OUTPATIENT
Start: 2024-09-29 | End: 2024-09-30

## 2024-09-29 RX ORDER — MORPHINE SULFATE 4 MG/ML
4 INJECTION, SOLUTION INTRAMUSCULAR; INTRAVENOUS ONCE
Status: COMPLETED | OUTPATIENT
Start: 2024-09-29 | End: 2024-09-29

## 2024-09-29 RX ORDER — ONDANSETRON 2 MG/ML
4 INJECTION INTRAMUSCULAR; INTRAVENOUS ONCE
Status: COMPLETED | OUTPATIENT
Start: 2024-09-29 | End: 2024-09-29

## 2024-09-29 RX ORDER — OCTREOTIDE ACETATE 50 UG/ML
50 INJECTION, SOLUTION INTRAVENOUS; SUBCUTANEOUS ONCE
Status: COMPLETED | OUTPATIENT
Start: 2024-09-29 | End: 2024-09-29

## 2024-09-29 RX ORDER — SODIUM CHLORIDE, SODIUM LACTATE, POTASSIUM CHLORIDE, CALCIUM CHLORIDE 600; 310; 30; 20 MG/100ML; MG/100ML; MG/100ML; MG/100ML
INJECTION, SOLUTION INTRAVENOUS
Status: COMPLETED
Start: 2024-09-29 | End: 2024-09-29

## 2024-09-29 RX ORDER — 0.9 % SODIUM CHLORIDE 0.9 %
1000 INTRAVENOUS SOLUTION INTRAVENOUS ONCE
Status: COMPLETED | OUTPATIENT
Start: 2024-09-29 | End: 2024-09-29

## 2024-09-29 RX ORDER — FENTANYL CITRATE-0.9 % NACL/PF 20 MCG/2ML
50 SYRINGE (ML) INTRAVENOUS EVERY 30 MIN PRN
Status: DISCONTINUED | OUTPATIENT
Start: 2024-09-29 | End: 2024-09-30

## 2024-09-29 RX ORDER — IOPAMIDOL 755 MG/ML
80 INJECTION, SOLUTION INTRAVASCULAR
Status: COMPLETED | OUTPATIENT
Start: 2024-09-29 | End: 2024-09-29

## 2024-09-29 RX ORDER — SODIUM CHLORIDE 9 MG/ML
INJECTION, SOLUTION INTRAVENOUS CONTINUOUS
Status: DISCONTINUED | OUTPATIENT
Start: 2024-09-30 | End: 2024-09-30

## 2024-09-29 RX ORDER — NOREPINEPHRINE BITARTRATE 0.06 MG/ML
1-100 INJECTION, SOLUTION INTRAVENOUS CONTINUOUS
Status: DISCONTINUED | OUTPATIENT
Start: 2024-09-29 | End: 2024-10-01

## 2024-09-29 RX ORDER — METRONIDAZOLE 500 MG/100ML
500 INJECTION, SOLUTION INTRAVENOUS ONCE
Status: COMPLETED | OUTPATIENT
Start: 2024-09-29 | End: 2024-09-30

## 2024-09-29 RX ORDER — SODIUM CHLORIDE, SODIUM LACTATE, POTASSIUM CHLORIDE, CALCIUM CHLORIDE 600; 310; 30; 20 MG/100ML; MG/100ML; MG/100ML; MG/100ML
INJECTION, SOLUTION INTRAVENOUS ONCE
Status: COMPLETED | OUTPATIENT
Start: 2024-09-29 | End: 2024-09-30

## 2024-09-29 RX ORDER — FENTANYL CITRATE 50 UG/ML
100 INJECTION, SOLUTION INTRAMUSCULAR; INTRAVENOUS ONCE
Status: COMPLETED | OUTPATIENT
Start: 2024-09-29 | End: 2024-09-29

## 2024-09-29 RX ADMIN — EPINEPHRINE 0.03 MG: 0.1 INJECTION, SOLUTION ENDOTRACHEAL; INTRACARDIAC; INTRAVENOUS at 15:37

## 2024-09-29 RX ADMIN — WATER 2000 MG: 1 INJECTION INTRAMUSCULAR; INTRAVENOUS; SUBCUTANEOUS at 16:25

## 2024-09-29 RX ADMIN — OCTREOTIDE ACETATE 50 MCG: 50 INJECTION, SOLUTION INTRAVENOUS; SUBCUTANEOUS at 16:23

## 2024-09-29 RX ADMIN — SODIUM CHLORIDE 1000 ML: 9 INJECTION, SOLUTION INTRAVENOUS at 18:22

## 2024-09-29 RX ADMIN — PROPOFOL 20 MCG/KG/MIN: 10 INJECTION, EMULSION INTRAVENOUS at 19:25

## 2024-09-29 RX ADMIN — METRONIDAZOLE 500 MG: 500 INJECTION, SOLUTION INTRAVENOUS at 23:34

## 2024-09-29 RX ADMIN — IOPAMIDOL 80 ML: 755 INJECTION, SOLUTION INTRAVENOUS at 20:50

## 2024-09-29 RX ADMIN — ONDANSETRON 4 MG: 2 INJECTION INTRAMUSCULAR; INTRAVENOUS at 06:03

## 2024-09-29 RX ADMIN — SODIUM CHLORIDE 10 MCG/MIN: 9 INJECTION, SOLUTION INTRAVENOUS at 20:04

## 2024-09-29 RX ADMIN — LANSOPRAZOLE 30 MG: 15 TABLET, ORALLY DISINTEGRATING ORAL at 06:08

## 2024-09-29 RX ADMIN — FENTANYL CITRATE 100 MCG: 50 INJECTION, SOLUTION INTRAMUSCULAR; INTRAVENOUS at 15:09

## 2024-09-29 RX ADMIN — FENTANYL CITRATE 100 MCG/HR: 50 INJECTION INTRAVENOUS at 15:39

## 2024-09-29 RX ADMIN — FENTANYL CITRATE 100 MCG: 50 INJECTION INTRAMUSCULAR; INTRAVENOUS at 15:09

## 2024-09-29 RX ADMIN — SODIUM CHLORIDE, POTASSIUM CHLORIDE, SODIUM LACTATE AND CALCIUM CHLORIDE: 600; 310; 30; 20 INJECTION, SOLUTION INTRAVENOUS at 19:33

## 2024-09-29 RX ADMIN — Medication 50 MCG: at 19:36

## 2024-09-29 RX ADMIN — SODIUM CHLORIDE, SODIUM LACTATE, POTASSIUM CHLORIDE, CALCIUM CHLORIDE: 600; 310; 30; 20 INJECTION, SOLUTION INTRAVENOUS at 19:33

## 2024-09-29 RX ADMIN — Medication 50 MCG: at 18:24

## 2024-09-29 RX ADMIN — OCTREOTIDE ACETATE 50 MCG/HR: 500 INJECTION, SOLUTION INTRAVENOUS; SUBCUTANEOUS at 16:28

## 2024-09-29 RX ADMIN — PANTOPRAZOLE SODIUM 80 MG: 40 INJECTION, POWDER, FOR SOLUTION INTRAVENOUS at 15:52

## 2024-09-29 RX ADMIN — OCTREOTIDE ACETATE 50 MCG/HR: 500 INJECTION, SOLUTION INTRAVENOUS; SUBCUTANEOUS at 23:40

## 2024-09-29 RX ADMIN — MORPHINE SULFATE 4 MG: 4 INJECTION, SOLUTION INTRAMUSCULAR; INTRAVENOUS at 06:03

## 2024-09-29 ASSESSMENT — LIFESTYLE VARIABLES
HOW MANY STANDARD DRINKS CONTAINING ALCOHOL DO YOU HAVE ON A TYPICAL DAY: PATIENT DOES NOT DRINK
HOW OFTEN DO YOU HAVE A DRINK CONTAINING ALCOHOL: NEVER

## 2024-09-29 ASSESSMENT — PAIN SCALES - GENERAL: PAINLEVEL_OUTOF10: 10

## 2024-09-29 ASSESSMENT — PULMONARY FUNCTION TESTS
PIF_VALUE: 28
PIF_VALUE: 22
PIF_VALUE: 24

## 2024-09-29 NOTE — ED PROVIDER NOTES
[facility] EMERGENCY DEPARTMENT  EMERGENCY DEPARTMENT ENCOUNTER        Pt Name: Cuate Stone  MRN: [id number]  Birthdate 1950  Date of evaluation: 9/29/2024  Provider: Chuck Limon DO  PCP: Awadalla, Bahaa A, MD  Note Started: 3:32 PM EDT 9/29/24    CHIEF COMPLAINT       Chief Complaint   Patient presents with    Shortness of Breath    GI Problem     Coffee ground emesis       HISTORY OF PRESENT ILLNESS: 1 or more Elements   History From: Patient, limited due to patient's condition.    Cuate Stone is a 73 y.o. male with a PMHx of peptic ulcer disease, who presents with shortness of breath and coffee-ground emesis.  Patient was seen earlier this morning at 0442 for complaints of coffee-ground emesis and abdominal pain.  He has per chart review a past history of stroke, right-sided hemiparesis, dysarthria, and dysphagia.  Patient is coming from a nursing facility and DNRCC paperwork was with him earlier this morning.  Patient returns today with similar complaints on top of shortness of breath.  Patient was hypotensive, tachycardic to the 130s.  Patient return to the emergency department given the emergent situation of hypotensive and tachycardia, the patient was asked multiple times about his DNRCC status, the patient was alert and oriented and capable of making his own decisions, patient was asked multiple times about chest compressions and putting a tube down his throat to help him breathe, patient states he wants all interventions to save his life.      Nursing Notes were all reviewed and agreed with or any disagreements were addressed in the HPI.      REVIEW OF EXTERNAL NOTES :       PDMP Monitoring:    Last PDMP Chuck as Reviewed:  Review User Review Instant Review Result            Urine Drug Screenings (1 yr)    No resulted procedures found.       Medication Contract and Consent for Opioid Use Documents Filed        No documents found                      REVIEW OF  SYSTEMS :      Positives and Pertinent negatives as per HPI.     SURGICAL HISTORY     Past Surgical History:   Procedure Laterality Date    CHOLECYSTECTOMY  7/17/2015    HERNIA REPAIR  2005    Dr Perkins        CURRENTMEDICATIONS       Previous Medications    ACETAMINOPHEN (TYLENOL) 325 MG TABLET    Take 2 tablets by mouth every 6 hours as needed for Pain or Fever    ACETAMINOPHEN (TYLENOL) 650 MG SUPPOSITORY    Place 1 suppository rectally every 6 hours as needed for Fever or Pain    ASPIRIN 81 MG CHEWABLE TABLET    81 mg by PEG Tube route daily    ATORVASTATIN (LIPITOR) 40 MG TABLET    40 mg by PEG Tube route daily    BACLOFEN (LIORESAL) 5 MG TABLET    2 tablets by PEG Tube route 2 times daily    BALSAM PERU-CASTOR OIL OINT    Apply 1 each topically 3 times daily    BISACODYL (DULCOLAX) 10 MG SUPPOSITORY    Place 1 suppository rectally daily as needed for Constipation    CARBOXYMETHYLCELLULOSE 1 % OPHTHALMIC SOLUTION    Place 1 drop into both eyes 4 times daily    DIVALPROEX (DEPAKOTE) 250 MG DR TABLET    Take 250 mg by mouth 3 times daily    DOCUSATE SODIUM (COLACE) 100 MG CAPSULE    1 capsule by PEG Tube route 2 times daily    FAMOTIDINE (PEPCID) 20 MG TABLET    Take 1 tablet by mouth every morning    FERROUS SULFATE 220 (44 FE) MG/5ML SOLUTION    Take 5 mLs by mouth daily Patient take 7.5mL via peg tube    FUROSEMIDE (LASIX) 40 MG TABLET    Take 1 tablet by mouth every morning Indications: edematous scrotum    GUAIFENESIN 400 MG TABLET    1 tablet by PEG Tube route every morning    HYDROCODONE-ACETAMINOPHEN (NORCO) 5-325 MG PER TABLET    Take 1 tablet by mouth every 6 hours as needed for Pain.    HYOSCYAMINE (ANASPAZ;LEVSIN) 125 MCG TABLET    1 tablet by PEG Tube route every 6 hours as needed (as needed for terminal secretions)    IPRATROPIUM-ALBUTEROL (DUONEB) 0.5-2.5 (3) MG/3ML SOLN NEBULIZER SOLUTION    Inhale 1 vial into the lungs 4 times daily    LOPERAMIDE (IMODIUM) 2 MG CAPSULE    Take 2 capsules by        Cuate Stone is a 73 y.o. male who presents for coffee-ground emesis and shortness of breath.  Upon initial evaluation the patient is in respiratory distress, with initial blood pressure of 106/78, heart rate of 158, respirations of 41, patient's oxygen saturation on nonrebreather dropped into the 80s.  Patient was alert and oriented and capable of making his own decisions, despite patient's CODE STATUS as DNR CC, the patient verbalized his wishes multiple times for full resuscitation including chest compressions and intubation.  Given the emergent nature of this case, the patient was intubated per procedure note above.  Patient's oxygen saturation improved to 100%, patient's blood pressures had improved as well, patient was still tachycardic in the 130s but improved throughout the visit.  Full workup with labs and imaging including CT pulmonary, abdomen pelvis, were ordered for further workup.  Pending laboratory results, patient's blood pressure had dropped and would require Levophed.  Central line was placed per procedure note above.  Levophed was started.  Patient began to come under sedated and blood pressures increased to 167/104, propofol was started, and blood pressures dropped back down into normal range.  Blood gases showed acidemia with a pCO2 of 50.9, pO2 308, bicarb of 19.6.  Patient's presentation was likely multifactorial concerning for hypovolemic shock due to GI bleed from source of peptic ulcer disease found in chart review.  Patient also had a source of infection with an elevated lactate of 3.9, urinalysis showed 3+ bacteria with greater than 100 white blood cells and large leuk esterase, Devine catheter in place.    In total the patient was given fentanyl and epinephrine prior to shocking the patient for patient's condition and attempt to improve the blood pressure.  Synchronized cardioversion was unsuccessful.  Patient was started on Protonix, octreotide, Rocephin and started on  fluids.    CT results were the following.  1. No sign of extravasation of injected intravenous contrast into the bowel  to correlate with a history of GI bleed and coffee ground emesis.  2. New small right pleural effusion.  3. Moderate consolidative patchy alveolar infiltrate in the infrahilar left  lower lobe. Moderate patchy alveolar infiltrates scattered throughout the  right lower lobe. Milder patchy alveolar infiltrate in the dependent portion  of the right upper lobe. This combination of it infiltrates is suggestive of  aspiration pneumonia, although other multifocal pneumonia could have this  appearance.  4. Free gastroesophageal reflux.  5. Increase in size of the left staghorn calculi, with prominently increased  size of the calculus in the lower pole calices, now filling the lower pole  collecting system. Slight increase in size of the calculi in the upper pole  calices.  6. Slight increase in size of the nonobstructive calculi in the upper pole of  the right kidney, remaining mild, up to 7 mm in length.  7. Stable severe descending and sigmoid diverticulosis with no sign of  diverticulitis.  8. Devine catheter again seen in the urinary bladder which has its superior  section herniated into a moderate sized left inguinal hernia. The balloon is  now located at the base of the bladder, previously seen in the portion of the  bladder in the hernia sac.  9. No change in mild diffuse osseous sclerosis of uncertain etiology.    Another possible source of infection could be from aspiration pneumonia given moderate consult of patchy alveolar infiltrate in the infrahilar left lower lobe, moderate patchy alveolar infiltrates scattered throughout the right lower lobe, mild patchy alveolar infiltrate in the dependent portion of the right upper lobe (result #3).    Consult was placed to Dr. Ta for general surgery for coffee-ground emesis, I spoke with the general surgery resident who recommended flushing PEG tube

## 2024-09-29 NOTE — ED PROVIDER NOTES
HPI   This is a 73-year-old male who presents to emergency department for evaluation of coffee-ground emesis, abdominal pain.  He has a past history of stroke, right-sided hemiparesis, dysarthria and dysphagia.  He had few episodes of coffee-ground emesis earlier today and has been having some generalized abdominal pain.  The patient is coming from a nursing facility.  He has DNR-CC paperwork here with him.  Review of Systems   Constitutional:  Negative for chills and fever.   HENT:  Negative for congestion.    Eyes:  Negative for redness.   Respiratory:  Negative for cough and shortness of breath.    Cardiovascular:  Negative for chest pain.   Gastrointestinal:  Positive for abdominal pain and nausea.        Coffee-ground emesis   Musculoskeletal:  Negative for back pain and neck pain.   Skin:  Negative for rash.        Physical Exam  Vitals and nursing note reviewed.   HENT:      Head: Normocephalic.      Nose: Nose normal.      Mouth/Throat:      Mouth: Mucous membranes are moist.      Pharynx: Oropharynx is clear.   Eyes:      Conjunctiva/sclera: Conjunctivae normal.   Cardiovascular:      Rate and Rhythm: Normal rate.   Abdominal:      General: There is no distension.      Comments: Mild generalized abdominal tenderness without rebound or guarding.   Musculoskeletal:         General: Normal range of motion.      Cervical back: Neck supple.   Skin:     General: Skin is warm.      Capillary Refill: Capillary refill takes less than 2 seconds.   Neurological:      Mental Status: He is alert and oriented to person, place, and time.      Comments: Mildly slurred speech          Procedures     MDM  73-year-old male here for evaluation of coffee-ground emesis and abdominal pain.  He is DNR CC, he has signed paperwork with him.  I discussed with patient whether or not he would like to obtain labs and imaging at this time.  He states that he does not want to do so and he would just like to treat his symptoms and he

## 2024-09-29 NOTE — ED NOTES
Radiology Procedure Waiver   Name: Cuate Stone  : 1950  MRN: [id number]    Date:  24    Time: 4:26 PM EDT    Benefits of immediately proceeding with radiology exam(s) without pre-testing outweigh the risks or are not indicated as specified below and therefore the following is/are being waived:    [x] Benefits of immediate radiology exam(s) outweigh any risk.                                               OR    Pre-exam testing is not indicated for the following reason(s):  [] Pregnancy test   [] Patients LMP on-time and regular.   [] Patient had Tubal Ligation or has other Contraception Device.   [] Patient  is Menopausal or Premenarcheal.    [] Patient had Full or Partial Hysterectomy.    [] Protocol for CT contrast allegry   [] Patient has tolerated well previously   [] Patient does not have a true allergy    [] MRI Questionnaire     [] BUN/Creatinine   [] Patient age w/no hx of renal dysfunction.   [] Patient on Dialysis.   [] Recent Normal Labs.  Electronically signed by Anjelica Coles DO on 24 at 4:26 PM EDT               Anjelica Coles DO  [phone]

## 2024-09-29 NOTE — ED NOTES
ETom 20; Hosea 100 given for sedation for ET placement. B/l breath sounds + color change. Mech vent initiated..

## 2024-09-30 PROBLEM — Z51.5 PALLIATIVE CARE ENCOUNTER: Status: ACTIVE | Noted: 2024-09-30

## 2024-09-30 LAB
AADO2: 97.4 MMHG
ABO/RH: NORMAL
ALBUMIN SERPL-MCNC: 2.8 G/DL (ref 3.5–5.2)
ALBUMIN SERPL-MCNC: 2.9 G/DL (ref 3.5–5.2)
ALP SERPL-CCNC: 87 U/L (ref 40–129)
ALP SERPL-CCNC: 90 U/L (ref 40–129)
ALT SERPL-CCNC: 16 U/L (ref 0–40)
ALT SERPL-CCNC: 16 U/L (ref 0–40)
ANION GAP SERPL CALCULATED.3IONS-SCNC: 11 MMOL/L (ref 7–16)
ANION GAP SERPL CALCULATED.3IONS-SCNC: 12 MMOL/L (ref 7–16)
ANION GAP SERPL CALCULATED.3IONS-SCNC: 13 MMOL/L (ref 7–16)
ANTIBODY SCREEN: NEGATIVE
ARM BAND NUMBER: NORMAL
AST SERPL-CCNC: 28 U/L (ref 0–39)
AST SERPL-CCNC: 29 U/L (ref 0–39)
B.E.: -6.6 MMOL/L (ref -3–3)
BASOPHILS # BLD: 0 K/UL (ref 0–0.2)
BASOPHILS NFR BLD: 0 % (ref 0–2)
BILIRUB SERPL-MCNC: 0.9 MG/DL (ref 0–1.2)
BILIRUB SERPL-MCNC: 0.9 MG/DL (ref 0–1.2)
BLOOD BANK BLOOD PRODUCT EXPIRATION DATE: NORMAL
BLOOD BANK BLOOD PRODUCT EXPIRATION DATE: NORMAL
BLOOD BANK DISPENSE STATUS: NORMAL
BLOOD BANK DISPENSE STATUS: NORMAL
BLOOD BANK ISBT PRODUCT BLOOD TYPE: 5100
BLOOD BANK ISBT PRODUCT BLOOD TYPE: 5100
BLOOD BANK PRODUCT CODE: NORMAL
BLOOD BANK PRODUCT CODE: NORMAL
BLOOD BANK SAMPLE EXPIRATION: NORMAL
BLOOD BANK UNIT TYPE AND RH: NORMAL
BLOOD BANK UNIT TYPE AND RH: NORMAL
BPU ID: NORMAL
BPU ID: NORMAL
BUN SERPL-MCNC: 62 MG/DL (ref 6–23)
BUN SERPL-MCNC: 63 MG/DL (ref 6–23)
BUN SERPL-MCNC: 63 MG/DL (ref 6–23)
CALCIUM SERPL-MCNC: 8 MG/DL (ref 8.6–10.2)
CALCIUM SERPL-MCNC: 8.1 MG/DL (ref 8.6–10.2)
CALCIUM SERPL-MCNC: 8.3 MG/DL (ref 8.6–10.2)
CHLORIDE SERPL-SCNC: 107 MMOL/L (ref 98–107)
CHLORIDE SERPL-SCNC: 108 MMOL/L (ref 98–107)
CHLORIDE SERPL-SCNC: 110 MMOL/L (ref 98–107)
CO2 SERPL-SCNC: 20 MMOL/L (ref 22–29)
CO2 SERPL-SCNC: 20 MMOL/L (ref 22–29)
CO2 SERPL-SCNC: 21 MMOL/L (ref 22–29)
COHB: 1.3 % (ref 0–1.5)
COMPONENT: NORMAL
COMPONENT: NORMAL
CREAT SERPL-MCNC: 1.8 MG/DL (ref 0.7–1.2)
CREAT SERPL-MCNC: 1.9 MG/DL (ref 0.7–1.2)
CREAT SERPL-MCNC: 1.9 MG/DL (ref 0.7–1.2)
CRITICAL: ABNORMAL
CROSSMATCH RESULT: NORMAL
CROSSMATCH RESULT: NORMAL
CRP SERPL HS-MCNC: 101 MG/L (ref 0–5)
DATE ANALYZED: ABNORMAL
DATE OF COLLECTION: ABNORMAL
EKG ATRIAL RATE: 170 BPM
EKG ATRIAL RATE: 72 BPM
EKG Q-T INTERVAL: 338 MS
EKG Q-T INTERVAL: 350 MS
EKG QRS DURATION: 128 MS
EKG QRS DURATION: 134 MS
EKG QTC CALCULATION (BAZETT): 518 MS
EKG QTC CALCULATION (BAZETT): 537 MS
EKG R AXIS: 61 DEGREES
EKG R AXIS: 96 DEGREES
EKG T AXIS: 15 DEGREES
EKG T AXIS: 35 DEGREES
EKG VENTRICULAR RATE: 132 BPM
EKG VENTRICULAR RATE: 152 BPM
EOSINOPHIL # BLD: 0 K/UL (ref 0.05–0.5)
EOSINOPHILS RELATIVE PERCENT: 0 % (ref 0–6)
ERYTHROCYTE [DISTWIDTH] IN BLOOD BY AUTOMATED COUNT: 15.3 % (ref 11.5–15)
FERRITIN SERPL-MCNC: 125 NG/ML
FIO2: 40 %
FOLATE SERPL-MCNC: 8.7 NG/ML (ref 4.8–24.2)
GFR, ESTIMATED: 37 ML/MIN/1.73M2
GFR, ESTIMATED: 37 ML/MIN/1.73M2
GFR, ESTIMATED: 38 ML/MIN/1.73M2
GLUCOSE BLD-MCNC: 117 MG/DL (ref 74–99)
GLUCOSE BLD-MCNC: 138 MG/DL (ref 74–99)
GLUCOSE SERPL-MCNC: 164 MG/DL (ref 74–99)
GLUCOSE SERPL-MCNC: 167 MG/DL (ref 74–99)
GLUCOSE SERPL-MCNC: 206 MG/DL (ref 74–99)
HBA1C MFR BLD: 5 % (ref 4–5.6)
HCO3: 17.1 MMOL/L (ref 22–26)
HCT VFR BLD AUTO: 36.1 % (ref 37–54)
HCT VFR BLD AUTO: 36.4 % (ref 37–54)
HCT VFR BLD AUTO: 40.1 % (ref 37–54)
HCT VFR BLD AUTO: 44.5 % (ref 37–54)
HGB BLD-MCNC: 11.7 G/DL (ref 12.5–16.5)
HGB BLD-MCNC: 11.9 G/DL (ref 12.5–16.5)
HGB BLD-MCNC: 13.2 G/DL (ref 12.5–16.5)
HGB BLD-MCNC: 14.5 G/DL (ref 12.5–16.5)
HHB: 0.9 % (ref 0–5)
IRON SATN MFR SERPL: 11 % (ref 20–55)
IRON SERPL-MCNC: 15 UG/DL (ref 59–158)
L PNEUMO1 AG UR QL IA.RAPID: NEGATIVE
LAB: ABNORMAL
LACTATE BLDV-SCNC: 2.3 MMOL/L (ref 0.5–2.2)
LACTATE BLDV-SCNC: 2.8 MMOL/L (ref 0.5–2.2)
LACTATE BLDV-SCNC: 2.9 MMOL/L (ref 0.5–2.2)
LACTATE BLDV-SCNC: 4.2 MMOL/L (ref 0.5–2.2)
LYMPHOCYTES NFR BLD: 1.89 K/UL (ref 1.5–4)
LYMPHOCYTES RELATIVE PERCENT: 7 % (ref 20–42)
Lab: 330
MAGNESIUM SERPL-MCNC: 2.1 MG/DL (ref 1.6–2.6)
MAGNESIUM SERPL-MCNC: 2.2 MG/DL (ref 1.6–2.6)
MCH RBC QN AUTO: 30.1 PG (ref 26–35)
MCHC RBC AUTO-ENTMCNC: 32.6 G/DL (ref 32–34.5)
MCV RBC AUTO: 92.3 FL (ref 80–99.9)
METHB: 0.1 % (ref 0–1.5)
MODE: AC
MONOCYTES NFR BLD: 2.6 K/UL (ref 0.1–0.95)
MONOCYTES NFR BLD: 9 % (ref 2–12)
NEUTROPHILS NFR BLD: 84 % (ref 43–80)
NEUTS SEG NFR BLD: 23.41 K/UL (ref 1.8–7.3)
O2 CONTENT: 20.8 ML/DL
O2 SATURATION: 99.1 % (ref 92–98.5)
O2HB: 97.7 % (ref 94–97)
OPERATOR ID: 5523
PATIENT TEMP: 37 C
PCO2: 29.7 MMHG (ref 35–45)
PEEP/CPAP: 5 CMH2O
PFO2: 3.59 MMHG/%
PH BLOOD GAS: 7.38 (ref 7.35–7.45)
PHOSPHATE SERPL-MCNC: 3.9 MG/DL (ref 2.5–4.5)
PLATELET # BLD AUTO: 203 K/UL (ref 130–450)
PMV BLD AUTO: 12.5 FL (ref 7–12)
PO2: 143.6 MMHG (ref 75–100)
POTASSIUM SERPL-SCNC: 4.8 MMOL/L (ref 3.5–5)
POTASSIUM SERPL-SCNC: 5.3 MMOL/L (ref 3.5–5)
POTASSIUM SERPL-SCNC: 5.5 MMOL/L (ref 3.5–5)
PROT SERPL-MCNC: 6 G/DL (ref 6.4–8.3)
PROT SERPL-MCNC: 6.2 G/DL (ref 6.4–8.3)
RBC # BLD AUTO: 4.82 M/UL (ref 3.8–5.8)
RBC # BLD: ABNORMAL 10*6/UL
RI(T): 0.68
RR MECHANICAL: 20 B/MIN
S PNEUM AG SPEC QL: NEGATIVE
SODIUM SERPL-SCNC: 139 MMOL/L (ref 132–146)
SODIUM SERPL-SCNC: 141 MMOL/L (ref 132–146)
SODIUM SERPL-SCNC: 142 MMOL/L (ref 132–146)
SOURCE, BLOOD GAS: ABNORMAL
SPECIMEN SOURCE: NORMAL
THB: 15 G/DL (ref 11.5–16.5)
TIBC SERPL-MCNC: 140 UG/DL (ref 250–450)
TIME ANALYZED: 409
TRANSFUSION STATUS: NORMAL
TRANSFUSION STATUS: NORMAL
TROPONIN I SERPL HS-MCNC: 83 NG/L (ref 0–11)
UNIT DIVISION: 0
UNIT DIVISION: 0
UNIT ISSUE DATE/TIME: NORMAL
UNIT ISSUE DATE/TIME: NORMAL
UNIT TAG COMMENT: NORMAL
UNIT TAG COMMENT: NORMAL
VIT B12 SERPL-MCNC: 1173 PG/ML (ref 211–946)
VT MECHANICAL: 450 ML
WBC OTHER # BLD: 27.9 K/UL (ref 4.5–11.5)

## 2024-09-30 PROCEDURE — 83735 ASSAY OF MAGNESIUM: CPT

## 2024-09-30 PROCEDURE — 82607 VITAMIN B-12: CPT

## 2024-09-30 PROCEDURE — 82805 BLOOD GASES W/O2 SATURATION: CPT

## 2024-09-30 PROCEDURE — 94003 VENT MGMT INPAT SUBQ DAY: CPT

## 2024-09-30 PROCEDURE — 2580000003 HC RX 258: Performed by: STUDENT IN AN ORGANIZED HEALTH CARE EDUCATION/TRAINING PROGRAM

## 2024-09-30 PROCEDURE — 80048 BASIC METABOLIC PNL TOTAL CA: CPT

## 2024-09-30 PROCEDURE — 83605 ASSAY OF LACTIC ACID: CPT

## 2024-09-30 PROCEDURE — 87086 URINE CULTURE/COLONY COUNT: CPT

## 2024-09-30 PROCEDURE — 99222 1ST HOSP IP/OBS MODERATE 55: CPT | Performed by: NURSE PRACTITIONER

## 2024-09-30 PROCEDURE — 85018 HEMOGLOBIN: CPT

## 2024-09-30 PROCEDURE — 1200000000 HC SEMI PRIVATE

## 2024-09-30 PROCEDURE — 82728 ASSAY OF FERRITIN: CPT

## 2024-09-30 PROCEDURE — 87070 CULTURE OTHR SPECIMN AEROBIC: CPT

## 2024-09-30 PROCEDURE — 6370000000 HC RX 637 (ALT 250 FOR IP)

## 2024-09-30 PROCEDURE — 2580000003 HC RX 258

## 2024-09-30 PROCEDURE — 84100 ASSAY OF PHOSPHORUS: CPT

## 2024-09-30 PROCEDURE — 87899 AGENT NOS ASSAY W/OPTIC: CPT

## 2024-09-30 PROCEDURE — 2580000003 HC RX 258: Performed by: INTERNAL MEDICINE

## 2024-09-30 PROCEDURE — 83540 ASSAY OF IRON: CPT

## 2024-09-30 PROCEDURE — 6360000002 HC RX W HCPCS: Performed by: STUDENT IN AN ORGANIZED HEALTH CARE EDUCATION/TRAINING PROGRAM

## 2024-09-30 PROCEDURE — 2500000003 HC RX 250 WO HCPCS

## 2024-09-30 PROCEDURE — 99291 CRITICAL CARE FIRST HOUR: CPT | Performed by: INTERNAL MEDICINE

## 2024-09-30 PROCEDURE — 6360000002 HC RX W HCPCS: Performed by: INTERNAL MEDICINE

## 2024-09-30 PROCEDURE — 2500000003 HC RX 250 WO HCPCS: Performed by: INTERNAL MEDICINE

## 2024-09-30 PROCEDURE — 36592 COLLECT BLOOD FROM PICC: CPT

## 2024-09-30 PROCEDURE — 87449 NOS EACH ORGANISM AG IA: CPT

## 2024-09-30 PROCEDURE — 83036 HEMOGLOBIN GLYCOSYLATED A1C: CPT

## 2024-09-30 PROCEDURE — 85025 COMPLETE CBC W/AUTO DIFF WBC: CPT

## 2024-09-30 PROCEDURE — 82746 ASSAY OF FOLIC ACID SERUM: CPT

## 2024-09-30 PROCEDURE — 80053 COMPREHEN METABOLIC PANEL: CPT

## 2024-09-30 PROCEDURE — 82962 GLUCOSE BLOOD TEST: CPT

## 2024-09-30 PROCEDURE — 6370000000 HC RX 637 (ALT 250 FOR IP): Performed by: INTERNAL MEDICINE

## 2024-09-30 PROCEDURE — 51702 INSERT TEMP BLADDER CATH: CPT

## 2024-09-30 PROCEDURE — 87077 CULTURE AEROBIC IDENTIFY: CPT

## 2024-09-30 PROCEDURE — 87205 SMEAR GRAM STAIN: CPT

## 2024-09-30 PROCEDURE — 6360000002 HC RX W HCPCS

## 2024-09-30 PROCEDURE — 83550 IRON BINDING TEST: CPT

## 2024-09-30 PROCEDURE — 93010 ELECTROCARDIOGRAM REPORT: CPT | Performed by: INTERNAL MEDICINE

## 2024-09-30 PROCEDURE — 2500000003 HC RX 250 WO HCPCS: Performed by: STUDENT IN AN ORGANIZED HEALTH CARE EDUCATION/TRAINING PROGRAM

## 2024-09-30 PROCEDURE — 86140 C-REACTIVE PROTEIN: CPT

## 2024-09-30 PROCEDURE — 86403 PARTICLE AGGLUT ANTBDY SCRN: CPT

## 2024-09-30 PROCEDURE — 87106 FUNGI IDENTIFICATION YEAST: CPT

## 2024-09-30 PROCEDURE — 85014 HEMATOCRIT: CPT

## 2024-09-30 PROCEDURE — 84484 ASSAY OF TROPONIN QUANT: CPT

## 2024-09-30 RX ORDER — CALCIUM GLUCONATE 20 MG/ML
1000 INJECTION, SOLUTION INTRAVENOUS ONCE
Status: COMPLETED | OUTPATIENT
Start: 2024-09-30 | End: 2024-09-30

## 2024-09-30 RX ORDER — METRONIDAZOLE 500 MG/100ML
500 INJECTION, SOLUTION INTRAVENOUS EVERY 8 HOURS
Status: DISCONTINUED | OUTPATIENT
Start: 2024-09-30 | End: 2024-10-02

## 2024-09-30 RX ORDER — GLUCAGON 1 MG/ML
1 KIT INJECTION PRN
Status: DISCONTINUED | OUTPATIENT
Start: 2024-09-30 | End: 2024-10-04 | Stop reason: HOSPADM

## 2024-09-30 RX ORDER — DEXTROSE MONOHYDRATE 25 G/50ML
25 INJECTION, SOLUTION INTRAVENOUS ONCE
Status: COMPLETED | OUTPATIENT
Start: 2024-09-30 | End: 2024-09-30

## 2024-09-30 RX ORDER — DEXTROSE MONOHYDRATE 100 MG/ML
INJECTION, SOLUTION INTRAVENOUS CONTINUOUS PRN
Status: DISCONTINUED | OUTPATIENT
Start: 2024-09-30 | End: 2024-10-04 | Stop reason: HOSPADM

## 2024-09-30 RX ORDER — DEXMEDETOMIDINE HYDROCHLORIDE 4 UG/ML
.1-1.5 INJECTION, SOLUTION INTRAVENOUS CONTINUOUS
Status: DISCONTINUED | OUTPATIENT
Start: 2024-09-30 | End: 2024-10-01

## 2024-09-30 RX ORDER — MIDODRINE HYDROCHLORIDE 5 MG/1
15 TABLET ORAL 3 TIMES DAILY
Status: DISCONTINUED | OUTPATIENT
Start: 2024-09-30 | End: 2024-10-01

## 2024-09-30 RX ORDER — OXYBUTYNIN CHLORIDE 5 MG/1
5 TABLET ORAL 2 TIMES DAILY
Status: ON HOLD | COMMUNITY
End: 2024-10-03 | Stop reason: HOSPADM

## 2024-09-30 RX ADMIN — SODIUM BICARBONATE: 84 INJECTION, SOLUTION INTRAVENOUS at 19:35

## 2024-09-30 RX ADMIN — SODIUM CHLORIDE 25 MCG/MIN: 9 INJECTION, SOLUTION INTRAVENOUS at 09:15

## 2024-09-30 RX ADMIN — CEFEPIME 2000 MG: 2 INJECTION, POWDER, FOR SOLUTION INTRAVENOUS at 17:00

## 2024-09-30 RX ADMIN — SODIUM CHLORIDE: 9 INJECTION, SOLUTION INTRAVENOUS at 02:50

## 2024-09-30 RX ADMIN — DEXTROSE MONOHYDRATE 25 G: 25 INJECTION, SOLUTION INTRAVENOUS at 04:29

## 2024-09-30 RX ADMIN — SODIUM CHLORIDE, PRESERVATIVE FREE 40 MG: 5 INJECTION INTRAVENOUS at 04:19

## 2024-09-30 RX ADMIN — MIDODRINE HYDROCHLORIDE 15 MG: 10 TABLET ORAL at 20:40

## 2024-09-30 RX ADMIN — VANCOMYCIN HYDROCHLORIDE 2000 MG: 10 INJECTION, POWDER, LYOPHILIZED, FOR SOLUTION INTRAVENOUS at 06:29

## 2024-09-30 RX ADMIN — METRONIDAZOLE 500 MG: 500 INJECTION, SOLUTION INTRAVENOUS at 20:40

## 2024-09-30 RX ADMIN — CALCIUM GLUCONATE 1000 MG: 20 INJECTION, SOLUTION INTRAVENOUS at 04:35

## 2024-09-30 RX ADMIN — SODIUM BICARBONATE: 84 INJECTION, SOLUTION INTRAVENOUS at 06:35

## 2024-09-30 RX ADMIN — Medication 50 MCG: at 08:37

## 2024-09-30 RX ADMIN — OCTREOTIDE ACETATE 50 MCG/HR: 500 INJECTION, SOLUTION INTRAVENOUS; SUBCUTANEOUS at 11:14

## 2024-09-30 RX ADMIN — INSULIN HUMAN 10 UNITS: 100 INJECTION, SOLUTION PARENTERAL at 04:29

## 2024-09-30 RX ADMIN — SODIUM BICARBONATE 50 MEQ: 84 INJECTION INTRAVENOUS at 06:53

## 2024-09-30 RX ADMIN — MIDODRINE HYDROCHLORIDE 15 MG: 10 TABLET ORAL at 16:58

## 2024-09-30 RX ADMIN — Medication 0.4 MCG/KG/HR: at 10:28

## 2024-09-30 RX ADMIN — DOXYCYCLINE 100 MG: 100 INJECTION, POWDER, LYOPHILIZED, FOR SOLUTION INTRAVENOUS at 01:39

## 2024-09-30 RX ADMIN — METRONIDAZOLE 500 MG: 500 INJECTION, SOLUTION INTRAVENOUS at 14:17

## 2024-09-30 RX ADMIN — PROPOFOL 25 MCG/KG/MIN: 10 INJECTION, EMULSION INTRAVENOUS at 02:43

## 2024-09-30 RX ADMIN — SODIUM CHLORIDE, PRESERVATIVE FREE 40 MG: 5 INJECTION INTRAVENOUS at 17:44

## 2024-09-30 RX ADMIN — CEFEPIME 2000 MG: 2 INJECTION, POWDER, FOR SOLUTION INTRAVENOUS at 04:22

## 2024-09-30 RX ADMIN — OCTREOTIDE ACETATE 50 MCG/HR: 500 INJECTION, SOLUTION INTRAVENOUS; SUBCUTANEOUS at 21:41

## 2024-09-30 RX ADMIN — FENTANYL CITRATE 125 MCG/HR: 50 INJECTION INTRAVENOUS at 00:24

## 2024-09-30 ASSESSMENT — PAIN SCALES - GENERAL
PAINLEVEL_OUTOF10: 0

## 2024-09-30 ASSESSMENT — PULMONARY FUNCTION TESTS
PIF_VALUE: 18
PIF_VALUE: 21
PIF_VALUE: 18
PIF_VALUE: 17
PIF_VALUE: 31
PIF_VALUE: 18

## 2024-09-30 NOTE — PROGRESS NOTES
Pharmacy Consultation Note  (Antibiotic Dosing and Monitoring)    Initial consult date: 09/30/24  Consulting physician/provider: Dr. Chavez  Drug: Vancomycin  Indication: UTI    Age/  Gender Height Weight IBW  Allergy Information   73 y.o./male @FLOW(11:first:1)@ @FLOW[14:FIRST:1@     Ideal body weight: 75.3 kg (166 lb 0.1 oz)  Adjusted ideal body weight: 77.3 kg (170 lb 5.2 oz)   Elemental sulfur and Penicillins      Renal Function:  Recent Labs     09/29/24  1515 09/30/24  0256 09/30/24  0257   BUN 60* 62* 63*   CREATININE 1.6* 1.9* 1.9*       Intake/Output Summary (Last 24 hours) at 9/30/2024 0513  Last data filed at 9/30/2024 0500  Gross per 24 hour   Intake 1899.06 ml   Output --   Net 1899.06 ml       Vancomycin Monitoring:  Trough:  No results for input(s): \"VANCOTROUGH\" in the last 72 hours.  Random:  No results for input(s): \"VANCORANDOM\" in the last 72 hours.    Vancomycin Administration Times:  Recent vancomycin administrations        No vancomycin IV orders with administrations found.            Orders not given:            vancomycin (VANCOCIN) 2000 mg in 0.9% sodium chloride 500 mL IVPB    vancomycin (VANCOCIN) intermittent dosing (placeholder)                    Assessment:  Patient is a 73 y.o. male who has been initiated on vancomycin  Estimated Creatinine Clearance: 37 mL/min (A) (based on SCr of 1.9 mg/dL (H)).  To dose vancomycin, pharmacy will be utilizing dosing based off of levels because of patient's renal impairment/insufficiency    Plan:  Giving a one time Vanco 2000mg IV  Future dosing will be based on levels  Will check vancomycin levels when appropriate  Will continue to monitor renal function   Pharmacy to follow      [unfilled]  KAYLAN: [phone]  SEY: [phone]  SJW: [phone]

## 2024-09-30 NOTE — CONSULTS
Palliative Care Department  [phone]  Palliative Care Initial Consult  Provider Darshana Cheng, APRN - CNP     Cuate Stone  [id number]  Hospital Day: 2  Date of Initial Consult: 9/30/2024  Referring Provider: Day Croft MD  Palliative Medicine was consulted for assistance with: Goals of care, end-stage disease    HPI:   Cuate Stone is a 73 y.o. with a medical history of  stroke, right-sided hemiparesis, dysarthria, and dysphagia who was admitted on 9/29/2024 from nursing facility with a CHIEF COMPLAINT of shortness of breath, coffee-ground emesis. Patient was seen earlier this morning at 0442 for complaints of coffee-ground emesis and abdominal pain.   Patient is coming from a nursing facility and [facility] paperwork was with him earlier this morning.  Patient returns today with similar complaints on top of shortness of breath. Patient was hypotensive, tachycardic to the 130s.  Patient return to the emergency department given the emergent situation of hypotensive and tachycardia.  The ED changed the patient's CODE STATUS to full code and the patient was intubated.  Although, the patient has a guardian and this was not discussed with him.  GI, general surgery consulted.  Palliative medicine consulted for further assistance.    ASSESSMENT/PLAN:     Pertinent Hospital Diagnoses     Septic shock secondary to aspiration pneumonia  GI bleed with persistent coffee-ground emesis output from PEG tube with resultant blood loss anemia  Acute renal failure with chronic indwelling Devine catheter  History of stroke with residual deficits of right chris paresis, dysartria, dysphagia   Chronic, compensated diastolic congestive heart failure  Hypothyroidism  Asthma  Essential hypertension with current hypotension.  GERD  History of tobacco abuse      Palliative Care Encounter / Counseling Regarding Goals of Care  Please see detailed goals of care discussion as below  At this time, Cuate Stone, Does Not have

## 2024-09-30 NOTE — CONSULTS
GENERAL SURGERY  CONSULT NOTE  9/30/2024    Physician Consulted: Dr. Harris  Reason for Consult: Coffee ground emesis  Referring Physician: Dr. Hodges    KELTON Stone is a 73 y.o. male with PMHx CVA who presented with SOB and reported coffee ground emesis. He was intubated and transferred to the ICU. Per nursing staff, they suctioned coffee ground material from his ETT and have hung his G-tube to gravity with only bilious output. They have not witnessed any melena or hematochezia. He is on Levophed.      Past Medical History:   Diagnosis Date    Acquired hypothyroidism 03/09/2016    Acute renal failure (HCC)     Arthritis     Asthma     History of cardiovascular stress test 03/23/2016    lexiscan    Hypertension     Stroke (cerebrum) (HCC)        Past Surgical History:   Procedure Laterality Date    CHOLECYSTECTOMY  7/17/2015    HERNIA REPAIR  2005    Dr Perkins        Medications Prior to Admission:    Prior to Admission medications    Medication Sig Start Date End Date Taking? Authorizing Provider   Baclofen (LIORESAL) 5 MG tablet 2 tablets by PEG Tube route 2 times daily   Yes Fly Watkins MD   pantoprazole sodium (PROTONIX) 40 MG PACK packet 1 packet by Per G Tube route every morning (before breakfast)   Yes ProviderFly MD   carboxymethylcellulose 1 % ophthalmic solution Place 1 drop into both eyes 4 times daily   Yes ProviderFly MD   famotidine (PEPCID) 20 MG tablet Take 1 tablet by mouth every morning   Yes Fly Watkins MD   guaiFENesin 400 MG tablet 1 tablet by PEG Tube route every morning   Yes Fly Watkins MD   oxybutynin (DITROPAN) 5 MG tablet Take 1 tablet by mouth 3 times daily   Yes Fly Watkins MD   furosemide (LASIX) 40 MG tablet Take 1 tablet by mouth every morning Indications: edematous scrotum   Yes ProviderFly MD   metoprolol tartrate (LOPRESSOR) 50 MG tablet 1 tablet by PEG Tube route 2 times daily   Yes Fly Watkins  for Pain or Fever    Fly Watkins MD   sodium phosphate (FLEET) 7-19 GM/118ML Place 1 enema rectally once as needed (Constipation)  Patient not taking: Reported on 2/6/2023    Fly Watkins MD   Skin Protectants Misc. (JEANNETTE BARRIER PROTECTANT EX) Apply 1 each topically in the morning and at bedtime  Patient not taking: Reported on 2/6/2023    Fly Watkins MD   ipratropium-albuterol (DUONEB) 0.5-2.5 (3) MG/3ML SOLN nebulizer solution Inhale 1 vial into the lungs 4 times daily  Patient not taking: Reported on 3/21/2023    Fly Watkins MD   Multiple Vitamins-Minerals (THERAPEUTIC MULTIVITAMIN-MINERALS) tablet 1 tablet by PEG Tube route daily  Patient not taking: Reported on 2/6/2023    Fly Watkins MD   aspirin 81 MG chewable tablet 81 mg by PEG Tube route daily  Patient not taking: Reported on 2/6/2023    Fly Watkins MD   atorvastatin (LIPITOR) 40 MG tablet 40 mg by PEG Tube route daily  Patient not taking: Reported on 2/6/2023    Fly Watkins MD   divalproex (DEPAKOTE) 250 MG DR tablet Take 250 mg by mouth 3 times daily  Patient not taking: Reported on 5/26/2023    Fly Watkins MD   docusate sodium (COLACE) 100 MG capsule 1 capsule by PEG Tube route 2 times daily    Fly Watkins MD   melatonin 5 MG TABS tablet 5 mg by PEG Tube route nightly as needed (Sleeplessness)  Patient not taking: Reported on 2/6/2023    Fly Watkins MD   nitroGLYCERIN (NITROSTAT) 0.4 MG SL tablet Place 1 tablet under the tongue every 5 minutes as needed for Chest pain up to max of 3 total doses. If no relief after 1 dose, call 911.    Fly Watkins MD   OLANZapine (ZYPREXA) 5 MG tablet 5 mg by PEG Tube route nightly  Patient not taking: Reported on 5/26/2023    Fly Watkins MD   rivaroxaban (XARELTO) 20 MG TABS tablet 1 tablet by PEG Tube route daily (with breakfast)  Patient not taking: Reported on 6/21/2023    Fly Watkins MD  distension. There is stable severe descending and  sigmoid diverticulosis with no sign of diverticulitis. The appendix is visualized and is unremarkable.  No evidence of acute appendicitis. Pelvis: No acute abnormality of the prostate. A Devine catheter is again seen in the urinary bladder which has its superior section herniated into a moderate sized left inguinal hernia.  The balloon is now located at the base of the bladder, previously in the portion of the bladder in the hernia sac. There is no change in the small fat containing right inguinal hernia.  There is no sign of pelvic or inguinal mass or adenopathy. Peritoneum/Retroperitoneum: No evidence of ascites or free air. No evidence of lymphadenopathy. Aorta is normal in caliber. Bones/Soft Tissues: There is no sign of any abnormality of the superficial soft tissue structures. There is no change in mild diffuse osseous sclerosis of uncertain etiology. There is stable mild anterior wedging of the T10 through L1 vertebral bodies, old mild compression fractures.     1. No sign of extravasation of injected intravenous contrast into the bowel to correlate with a history of GI bleed and coffee ground emesis. 2. New small right pleural effusion. 3. Moderate consolidative patchy alveolar infiltrate in the infrahilar left lower lobe. Moderate patchy alveolar infiltrates scattered throughout the right lower lobe. Milder patchy alveolar infiltrate in the dependent portion of the right upper lobe. This combination of it infiltrates is suggestive of aspiration pneumonia, although other multifocal pneumonia could have this appearance. 4. Free gastroesophageal reflux. 5. Increase in size of the left staghorn calculi, with prominently increased size of the calculus in the lower pole calices, now filling the lower pole collecting system. Slight increase in size of the calculi in the upper pole calices. 6. Slight increase in size of the nonobstructive calculi in the upper pole  of the right kidney, remaining mild, up to 7 mm in length. 7. Stable severe descending and sigmoid diverticulosis with no sign of diverticulitis. 8. Devine catheter again seen in the urinary bladder which has its superior section herniated into a moderate sized left inguinal hernia. The balloon is now located at the base of the bladder, previously seen in the portion of the bladder in the hernia sac. 9. No change in mild diffuse osseous sclerosis of uncertain etiology.     XR CHEST PORTABLE    Result Date: 9/29/2024  EXAMINATION: ONE XRAY VIEW OF THE CHEST 9/29/2024 3:39 pm COMPARISON: 09/25/2022 HISTORY: ORDERING SYSTEM PROVIDED HISTORY: ET Tube placement TECHNOLOGIST PROVIDED HISTORY: Reason for exam:->ET Tube placement FINDINGS: Single supine view of the chest demonstrates an endotracheal tube in good position approximately 3-4 cm above the phyllis the lung fields are clear and there is no pneumothorax.     1. Endotracheal tube in good position. 2. No acute cardiopulmonary disease.         ASSESSMENT:  73 y.o. male with coffee ground emesis and likely PNA    PLAN:  - Pain/sedation per ICU  - Monitor hemodynamics, likely septic shock from PNA  - Pulmonary toilet  - Continue IV abx  - Monitor Hgb, has been stable without any signs of GI bleeding  - Will defer endoscopy to GI  - No plans for acute surgical intervention  - Please call with any questions  - Discussed with Dr. Harris    Electronically signed by Vish Ambriz MD on 9/30/24 at 7:28 AM EDT    Patient was seen and examined  I agree with the residents assessment and plan as noted above  Bilious output noted from the patient's gastrostomy tube to gravity  Will defer endoscopy to GI  Please call with questions.

## 2024-09-30 NOTE — PROGRESS NOTES
Speech Language Pathology  NAME:  Cuate Stone  :  1950  DATE: 2024  ROOM:  [place] [id number]    Pt unavailable at 8:19 am for Clinical Swallow Evaluation Discussed with patient nurse in person    REASON:  Patient intubated prior to evaluation being completed please reorder evaluation when medically appropriate.      Thank You    Jennifer Hart MSCCC/SLP  Speech Language Pathologist  [phone]

## 2024-09-30 NOTE — PROGRESS NOTES
Physical Therapy    Physical Therapy    Room #: [id number]  Date: 2024       Patient Name: Cuate Stone  : 1950      MRN: [id number]     Patient unavailable for physical therapy eval due to unavailable/not appropriate per nursing due to recently extubated . Will attempt PT evaluation tomorrow. Thank you.       Ed Li, PT

## 2024-09-30 NOTE — PROGRESS NOTES
Assessment and Plan  Patient is a 73 y.o. male with the following medical Problems:   Acute hypoxic respiratory failure requiring intubation mechanical ventilation  Upper GI bleeding  Severe sepsis with septic shock requiring Levophed  Urinary tract infection  Aspiration pneumonia  Metabolic encephalopathy  Acute kidney injury  Heart failure with preserved ejection fraction  History of nicotine    Plan of care:  Broad-spectrum antibiotic with cefepime, Flagyl AND vancomycin  Protonix twice a day  Goals of care discussion with the POA.  Transfuse to keep hemoglobin above 7  GI consult  Precedex for sedation and wean propofol  Levophed to keep MAP above 65  SCDs for DVT prophylaxis  Serial lactate and serial H&H  Palliative care  Midodrine 15 mg to help weaning off Levophed.      History of Present Illness:   History is limited due to patient's medical condition.  History is mostly obtained from the chart  Patient is a 73-year-old man with  peptic ulcer disease, who presents with shortness of breath and coffee-ground emesis.  Patient was seen earlier this morning at 0442 for complaints of coffee-ground emesis and abdominal pain.  He has per chart review a past history of stroke, right-sided hemiparesis, dysarthria, and dysphagia.  Patient is coming from a nursing facility and DNRCC paperwork was with him earlier this morning.  Patient returns today with similar complaints on top of shortness of breath.  Patient was hypotensive, tachycardic to the 130s.  Patient return to the emergency department given the emergent situation of hypotensive and tachycardia, the patient was asked multiple times about his DNRCC status, the patient was alert and oriented and capable of making his own decisions, patient was asked multiple times about chest compressions and putting a tube down his throat to help him breathe, patient states he wants all interventions to save his life.     Daily progress:  September 30, 2024: Patient continues  mcg/hr, IntraVENous, Continuous, Rcslak, Anjelica A, DO, Last Rate: 10 mL/hr at 09/30/24 1114, 50 mcg/hr at 09/30/24 1114    pantoprazole (PROTONIX) 40 mg in sodium chloride (PF) 0.9 % 10 mL injection, 40 mg, IntraVENous, Q12H, Vish Ambriz MD, 40 mg at 09/30/24 0419    norepinephrine (LEVOPHED) 16 mg in sodium chloride 0.9 % 250 mL infusion, 1-100 mcg/min, IntraVENous, Continuous, Rcslak, Anjelica A, DO, Last Rate: 18.8 mL/hr at 09/30/24 0917, 20 mcg/min at 09/30/24 0917    Review of Systems:   Unable to obtain due to medical condition    Physical Exam:   Vital Signs:  BP (!) 140/79   Pulse 93   Temp 98.1 °F (36.7 °C) (Core)   Resp 20   Ht 1.803 m (5' 11\")   Wt 80.2 kg (176 lb 12.8 oz)   SpO2 100%   BMI 24.66 kg/m²     Input/Output:  In: 2887.2 [I.V.:2119.4]  Out: 770     Oxygen requirements: MV    Ventilator Information:  Vent Information  Ventilator ID: [id number]  Ventilator Initiate: Yes  Vent Mode: AC/VC    General appearance: , not in pain or distress, in no respiratory distress    HEENT: Intubated  Neck: Supple, no jugular venous distension, lymphadenopathy, thyromegaly or carotid bruits  Chest: Decreased breath sounds, no wheezing, no crackles and no tenderness over ribs   Cardiovascular: Normal S1 , S2, regular rate and rhythm, no murmur, rub or gallop  Abdomen: Normal sounds present, soft, lax with no tenderness, no hepatosplenomegaly, and no masses  Extremities: No edema. Pulses are equally present.   Skin: intact, no rashes   Neurologic: Sedated and mechanically ventilated     Investigations:  Labs, radiological imaging and cardiac work up were personally reviewed and independently interpreted.        ICU STAFF PHYSICIAN NOTE OF PERSONAL INVOLVEMENT IN CARE  As the attending physician, I certify that I personally reviewed the patient's history and personally examined the patient to confirm the physical findings described above, and that I reviewed the relevant imaging studies and available  reports.  I also discussed the differential diagnosis and all of the proposed management plans with the patient and individuals accompanying the patient to this visit.  They had the opportunity to ask questions about the proposed management plans and to have those questions answered.    This patient has a high probability of sudden, clinically significant deterioration, which requires the highest level of physician preparedness to intervene urgently.  I managed/supervised life or organ supporting interventions that required frequent physician assessment.  I devoted my full attention to the direct care of this patient for the amount of time indicated below.  Time I spent with family or surrogate(s) is included only if the patient was incapable of providing the necessary information or participating in medical decision-making.  Time devoted to teaching and to any procedures I billed separately is not included.  Critical Care Time: 31 minutes      Electronically signed by Day Croft MD on 9/30/2024 at 12:19 PM

## 2024-09-30 NOTE — CONSULTS
CONSULT  Wilmer Willingham M.D.  [facility]  Dr. Brianna Delarosa M.D.,  Dr. Gabino Disla M.D.,  Dr. Fernando Baker D.O.,  Dr. Mike Balderrama D.O. ,  Dr. Keith Carrera M.D.,          Cuate Stone  73 y.o.  male      Re: \"Coffee-ground emesis\"  Requesting physician: MARIELY Glez/intensivist service  Date:9:07 AM 9/30/2024          HPI: 73-year-old male patient seen in the hospital for above described issue.  Patient apparently presented with coffee-ground emesis and shortness of breath.  Apparently came earlier in the day in the emergency department and was sent home only to return with similar symptoms.  Patient is currently intubated and sedated with no family at bedside.  He was started on PPI and Sandostatin drips.  Patient has history of stroke and PEG tube.  Patient has been seen in the past by our group and found to have ulcer (2015) it is not clear if patient has followed since.  Hemoglobin on presentation of 14.2 and earlier this morning 14.5.  Platelet count is 203 and white blood cell count is 27.9.  Chemistry panel shows potassium of 5.5.  BUN is 63 with creatinine of 1.9 which is significantly above baseline from 2022.  Lactic acid is elevated at 4.2.  Liver profile showed nonelevated transaminases, nonelevated alkaline phosphatase and nonelevated bilirubin.  Initial lipase on presentation yesterday was slightly elevated at 80.  CT scan of the abdomen and pelvis was obtained with IV contrast reported to show no evidence of gastrointestinal bleeding (no sign of extravasation of injected intravenous contrast into the bowel) other abnormalities not related to the GI tract are also reported.      Information sources:   -medical record  -health care team    PMHx:  Past Medical History:   Diagnosis Date    Acquired hypothyroidism 03/09/2016    Acute renal failure (HCC)     Arthritis     Asthma     History of cardiovascular stress test 03/23/2016    lexiscan    Hypertension     Stroke  Not on file    Number of children: Not on file    Years of education: Not on file    Highest education level: Not on file   Occupational History    Not on file   Tobacco Use    Smoking status: Former     Current packs/day: 0.00     Types: Cigarettes     Quit date: 7/15/2010     Years since quittin.2    Smokeless tobacco: Former   Vaping Use    Vaping status: Never Used   Substance and Sexual Activity    Alcohol use: No     Alcohol/week: 0.0 standard drinks of alcohol    Drug use: No    Sexual activity: Not Currently   Other Topics Concern    Not on file   Social History Narrative    Not on file     Social Determinants of Health     Financial Resource Strain: Not on file   Food Insecurity: Patient Unable To Answer (2024)    Hunger Vital Sign     Worried About Running Out of Food in the Last Year: Patient unable to answer     Ran Out of Food in the Last Year: Patient unable to answer   Transportation Needs: Patient Unable To Answer (2024)    PRAPARE - Transportation     Lack of Transportation (Medical): Patient unable to answer     Lack of Transportation (Non-Medical): Patient unable to answer   Physical Activity: Not on file   Stress: Not on file   Social Connections: Patient Unable To Answer (2024)    Social Connections ([facility] HRSN)     If for any reason you need help with day-to-day activities such as bathing, preparing meals, shopping, managing finances, etc., do you get the help you need?: Not on file   Intimate Partner Violence: Not on file   Housing Stability: Patient Unable To Answer (2024)    Housing Stability Vital Sign     Unable to Pay for Housing in the Last Year: Patient unable to answer     Number of Times Moved in the Last Year: 0     Homeless in the Last Year: Patient unable to answer       FamHx:  Family History   Problem Relation Age of Onset    Heart Failure Mother     Heart Disease Mother         CABG    Heart Failure Father        Allergy:  Allergies   Allergen Reactions        Gross hematuria    Hypertension    History of stroke    Respiratory failure (HCC)    Severe protein-calorie malnutrition (HCC)    Acute respiratory failure with hypoxia and hypercapnia     1.  GI bleed  -Stable H&H with reported coffee-ground emesis  -High-dose IV PPI  -History of ulcer  -Obtain iron studies and FOBT  -Plan for further evaluation with upper endoscopy  -Monitor H&H every 6 hours  -Defer transfusion to admitting    2.  Hypotension/leukocytosis  -Clinical picture of sepsis with septic shock  -Per admitting/CCM    3.  Comorbidities  -Elevated troponin  -Given patient history, would recommend cardiology evaluation  -Per admitting/pertinent consultants    Thank you for the opportunity to see this patient in consultation    Wilmer Willingham MD  9/30/2024  9:07 AM    NOTE:  This report was transcribed using voice recognition software.  Every effort was made to ensure accuracy; however, inadvertent computerized transcription errors may be present.

## 2024-09-30 NOTE — H&P
Department of Internal Medicine  History and Physical    PCP: Awadalla, Bahaa A, MD  Admitting Physician: Dr. Hodges/Tani  Consultants:   Date of Service: 9/29/2024    CHIEF COMPLAINT:  coffee ground emesis/sob    HISTORY OF PRESENT ILLNESS:    Patient is 73-year-old male who presented to the ED with shortness of breath and Coffee-ground emesis currently on exam patient is intubated.  Patient was brought into the ED earlier in the day and sent home for similar symptoms.  However he presented back again.  Initially he was DNR CCA however patient after being questioned wanted all life-saving measures to be taken.  Patient then intubated in the ED for respiratory failure.  Patient placed on Protonix and octreotide drip.  General surgery and critical care consulted.    PAST MEDICAL Hx:  Past Medical History:   Diagnosis Date    Acquired hypothyroidism 03/09/2016    Acute renal failure (HCC)     Arthritis     Asthma     History of cardiovascular stress test 03/23/2016    lexiscan    Hypertension     Stroke (cerebrum) (HCC)        PAST SURGICAL Hx:   Past Surgical History:   Procedure Laterality Date    CHOLECYSTECTOMY  7/17/2015    HERNIA REPAIR  2005    Dr Perkins        FAMILY Hx:  Family History   Problem Relation Age of Onset    Heart Failure Mother     Heart Disease Mother         CABG    Heart Failure Father        HOME MEDICATIONS:  Prior to Admission medications    Medication Sig Start Date End Date Taking? Authorizing Provider   Baclofen (LIORESAL) 5 MG tablet 2 tablets by PEG Tube route 2 times daily   Yes Fly Watkins MD   pantoprazole sodium (PROTONIX) 40 MG PACK packet 1 packet by Per G Tube route every morning (before breakfast)   Yes Fly Watkins MD   carboxymethylcellulose 1 % ophthalmic solution Place 1 drop into both eyes 4 times daily   Yes Fly Watkins MD   famotidine (PEPCID) 20 MG tablet Take 1 tablet by mouth every morning   Yes Fly Watkins MD   guaiFENesin 400  for Pain.    Fly Watkins MD   promethazine (PHENERGAN) 12.5 MG suppository Place 1 suppository rectally every 6 hours as needed for Nausea    Fly Watkins MD   LORazepam (ATIVAN) 0.5 MG tablet 0.5 mg by PEG Tube route every 4 hours as needed for Anxiety.  Patient not taking: Reported on 9/30/2024    Fly Watkins MD   acetaminophen (TYLENOL) 325 MG tablet Take 2 tablets by mouth every 6 hours as needed for Pain or Fever    Fly Watkins MD   sodium phosphate (FLEET) 7-19 GM/118ML Place 1 enema rectally once as needed (Constipation)  Patient not taking: Reported on 2/6/2023    Fly Watkins MD   Skin Protectants, Misc. (JEANNETTE BARRIER PROTECTANT EX) Apply 1 each topically in the morning and at bedtime  Patient not taking: Reported on 2/6/2023    Fly Watkins MD   ipratropium-albuterol (DUONEB) 0.5-2.5 (3) MG/3ML SOLN nebulizer solution Inhale 1 vial into the lungs 4 times daily  Patient not taking: Reported on 3/21/2023    Fly Watkins MD   Multiple Vitamins-Minerals (THERAPEUTIC MULTIVITAMIN-MINERALS) tablet 1 tablet by PEG Tube route daily  Patient not taking: Reported on 2/6/2023    Fly Watkins MD   aspirin 81 MG chewable tablet 81 mg by PEG Tube route daily  Patient not taking: Reported on 2/6/2023    Fly Watkins MD   atorvastatin (LIPITOR) 40 MG tablet 40 mg by PEG Tube route daily  Patient not taking: Reported on 2/6/2023    Fly Watkins MD   divalproex (DEPAKOTE) 250 MG DR tablet Take 250 mg by mouth 3 times daily  Patient not taking: Reported on 5/26/2023    Fly Watkins MD   docusate sodium (COLACE) 100 MG capsule 1 capsule by PEG Tube route 2 times daily    Fly Watkins MD   melatonin 5 MG TABS tablet 5 mg by PEG Tube route nightly as needed (Sleeplessness)  Patient not taking: Reported on 2/6/2023    Fly Watkins MD   nitroGLYCERIN (NITROSTAT) 0.4 MG SL tablet Place 1 tablet under the tongue

## 2024-09-30 NOTE — PROGRESS NOTES
Internal Medicine Progress Note     [facility]     Antoine Hodges D.O., LEIGHANN Freire D.O., LEIGHANN Jiang D.O.     Usha Gomez, MSN, APRN, NP-C  Erlin Contreras, MSN, APRN-CNP  Willis Rosales, MSN, APRN, NP-C  Cheryl Quiroz, MSN, APRN-CNP  Onelia Young, MSN, APRN, NP-C     Primary Care Physician: Awadalla, Bahaa A, MD   Admitting Physician:  Antoine Hodges DO  Admission date and time: 9/29/2024  2:58 PM    Room:  [place] [address] [address]  Admitting diagnosis: Shock [R57.9]  Respiratory distress [R06.03]  Coffee ground emesis [K92.0]  Atrial fibrillation with rapid ventricular response (HCC) [I48.91]  Acute respiratory failure with hypoxia and hypercapnia [J96.01, J96.02]  Gastrointestinal hemorrhage, unspecified gastrointestinal hemorrhage type [K92.2]    Patient Name: Cuate Stone  MRN: [id number]    Date of Service: 9/30/2024     Subjective:  Cuate is a 73 y.o. male who was seen and examined today,9/30/2024, at the bedside. Cuate was evaluated in the intensive care unit while maintained on mechanical ventilation with underlying sedation.  Please refer to the emergency department events related to the patient's previous history of DNRCC status with the patient requiring all aggressive intervention be undertaken on presentation.  There are no family members present during my examination and he is maintained on broad-spectrum antibiotic therapy, sedation, vasopressor therapy, and both octreotide and Protonix in the setting of GI bleeding.  Fluid resuscitation is being administered as well.  PEG tube has been placed to gravity with coffee-ground output.  Devine catheter has been exchanged.  He is critically ill at this point necessitating ongoing monitoring in the intensive care unit.    Review of System:   Unable to be obtained in his current condition    Physical Exam:  I/O this shift:  In: 1899.1 [I.V.:1566.2; IV Piggyback:332.9]  Out: -  as the patient requested to be transitioned from DNRCC status to full CODE STATUS.  He has since been intubated.  He is maintained on broad-spectrum antibiotic therapy.  Vasopressor therapy is being employed along with aggressive fluid resuscitation.  Octreotide infusion is being employed along with Protonix in the setting of coffee-ground emesis and persistent output from the PEG tube.  Devine catheter has been exchanged.  He remains critically ill at this point necessitating ongoing management in the intensive care unit.  Multiple subspecialists have been asked to provide consultation.  Further CODE STATUS clarification with family today.  Continue current therapy.  See orders for further plan of care.    Greater than 40 minutes of critical care time was spent with the patient.  This time included chart review, , and discussion with those consultants involved in the patient's care.    More than 50% of my  time was spent at the bedside counseling/coordinating care with the patient and/or family with face to face contact.  This time was spent reviewing notes and laboratory data as well as instructing and counseling the patient. Time I spent with the family or surrogate(s) is included only if the patient was incapable of providing the necessary information or participating in medical decisions. I also discussed the differential diagnosis and all of the proposed management plans with the patient and individuals accompanying the patient.    Cuate requires this high level of physician care and nursing on the IMC/Telemetry unit due the complexity of decision management and chance of rapid decline or death.  Continued cardiac monitoring and higher level of nursing are required. I am readily available for any further decision-making and intervention.     Jericho Freire DO, F.A.CFrantzO.I.  9/30/2024  6:38 AM

## 2024-09-30 NOTE — PLAN OF CARE
Problem: Skin/Tissue Integrity  Goal: Absence of new skin breakdown  Description: 1.  Monitor for areas of redness and/or skin breakdown  2.  Assess vascular access sites hourly  3.  Every 4-6 hours minimum:  Change oxygen saturation probe site  4.  Every 4-6 hours:  If on nasal continuous positive airway pressure, respiratory therapy assess nares and determine need for appliance change or resting period.  Outcome: Progressing     Problem: Safety - Medical Restraint  Goal: Remains free of injury from restraints (Restraint for Interference with Medical Device)  Description: INTERVENTIONS:  1. Determine that other, less restrictive measures have been tried or would not be effective before applying the restraint  2. Evaluate the patient's condition at the time of restraint application  3. Inform patient/family regarding the reason for restraint  4. Q2H: Monitor safety, psychosocial status, comfort, nutrition and hydration  Outcome: Progressing  Flowsheets (Taken 9/30/2024 0205)  Remains free of injury from restraints (restraint for interference with medical device): Every 2 hours: Monitor safety, psychosocial status, comfort, nutrition and hydration

## 2024-09-30 NOTE — CARE COORDINATION
Case Management Assessment  Initial Evaluation    Date/Time of Evaluation: 9/30/2024 2:55 PM  Assessment Completed by: Kelley Leon RN    If patient is discharged prior to next notation, then this note serves as note for discharge by case management.    Patient Name: Cuate Stone                   YOB: 1950  Diagnosis: Shock [R57.9]  Respiratory distress [R06.03]  Coffee ground emesis [K92.0]  Atrial fibrillation with rapid ventricular response (HCC) [I48.91]  Acute respiratory failure with hypoxia and hypercapnia [J96.01, J96.02]  Gastrointestinal hemorrhage, unspecified gastrointestinal hemorrhage type [K92.2]                   Date / Time: 9/29/2024  2:58 PM    Patient Admission Status: Inpatient   Readmission Risk (Low < 19, Mod (19-27), High > 27): Readmission Risk Score: 16.7    Current PCP: Awadalla, Bahaa A, MD  PCP verified by CM? Yes    Chart Reviewed: Yes      History Provided by: Other (see comment), Medical Record (SNF)  Patient Orientation: Alert and Oriented, Person (has a legal guardian of person)    Patient Cognition: Alert    Hospitalization in the last 30 days (Readmission):  No          Code Status: DNR-CC   Patient's Primary Decision Maker is: Named in Scanned ACP Document (legal guardian)    Primary Decision Maker: Indar Tan - Legal Guardian, Legal Guardian - [phone]    Discharge Planning:    Patient lives with: Other (Comment) (other residents) Type of Home: Skilled Nursing Facility  Primary Care Giver: Other (Comment) ([facility])  Patient Support Systems include: Other (Comment), Friends/Neighbors (SNF)      Current services prior to admission: Skilled Nursing Facility            Current DME:  SNF            Type of Home Care services:  Skilled Therapy, Nursing Services, Aide Services    ADLS  Prior functional level: Assistance with the following:, Other (see comment) (LTC resident)  Current functional level: Assistance with the following:, Other (see comment) (LTC  RN-BC  Case Management Department  Ph: [phone]

## 2024-09-30 NOTE — PROGRESS NOTES
4 Eyes Skin Assessment     NAME:  Cuate Stone  YOB: 1950  MEDICAL RECORD NUMBER:  [id number]    The patient is being assessed for  Admission    I agree that at least one RN has performed a thorough Head to Toe Skin Assessment on the patient. ALL assessment sites listed below have been assessed.      Areas assessed by both nurses:    Head, Face, Ears, Shoulders, Back, Chest, Arms, Elbows, Hands, Sacrum. Buttock, Coccyx, Ischium, Legs. Feet and Heels, and Under Medical Devices         Does the Patient have a Wound? Yes wound(s) were present on assessment. LDA wound assessment was Initiated and completed by RN non blanchable coccyx, abrasions to BLE, ecchymosis BUE       Michael Prevention initiated by RN: Yes  Wound Care Orders initiated by RN: Yes    Pressure Injury (Stage 3,4, Unstageable, DTI, NWPT, and Complex wounds) if present, place Wound referral order by RN under : No    New Ostomies, if present place, Ostomy referral order under : No     Nurse 1 eSignature: Electronically signed by Elana Blake RN on 9/30/24 at 4:51 AM EDT    **SHARE this note so that the co-signing nurse can place an eSignature**    Nurse 2 eSignature: Electronically signed by Rafi Gutiérrez RN on 9/30/24 at 4:52 AM EDT

## 2024-09-30 NOTE — CONSULTS
Assessment and Plan  Patient is a 73 y.o. male with the following medical Problems:   Acute hypoxic respiratory failure requiring intubation mechanical ventilation  Upper GI bleeding  Severe sepsis with septic shock requiring Levophed  Urinary tract infection  Aspiration pneumonia  Metabolic encephalopathy  Acute kidney injury  Heart failure with preserved ejection fraction  History of nicotine    Plan of care:  Broad-spectrum antibiotic with cefepime, Flagyl AND vancomycin  Protonix twice a day  Goals of care discussion with the POA.  Transfuse to keep hemoglobin above 7  GI consult  Precedex for sedation and wean propofol  Levophed to keep MAP above 65  SCDs for DVT prophylaxis  Serial lactate      History of Present Illness:   History is limited due to patient's medical condition.  History is mostly obtained from the chart  Patient is a 73-year-old man with  peptic ulcer disease, who presents with shortness of breath and coffee-ground emesis.  Patient was seen earlier this morning at 0442 for complaints of coffee-ground emesis and abdominal pain.  He has per chart review a past history of stroke, right-sided hemiparesis, dysarthria, and dysphagia.  Patient is coming from a nursing facility and DNRCC paperwork was with him earlier this morning.  Patient returns today with similar complaints on top of shortness of breath.  Patient was hypotensive, tachycardic to the 130s.  Patient return to the emergency department given the emergent situation of hypotensive and tachycardia, the patient was asked multiple times about his DNRCC status, the patient was alert and oriented and capable of making his own decisions, patient was asked multiple times about chest compressions and putting a tube down his throat to help him breathe, patient states he wants all interventions to save his life.     Past Medical History:  Past Medical History:   Diagnosis Date    Acquired hypothyroidism 03/09/2016    Acute renal failure (HCC)      sounds present, soft, lax with no tenderness, no hepatosplenomegaly, and no masses  Extremities: No edema. Pulses are equally present.   Skin: intact, no rashes   Neurologic: Sedated and mechanically ventilated     Investigations:  Labs, radiological imaging and cardiac work up were personally reviewed and independently interpreted.        ICU STAFF PHYSICIAN NOTE OF PERSONAL INVOLVEMENT IN CARE  As the attending physician, I certify that I personally reviewed the patient's history and personally examined the patient to confirm the physical findings described above, and that I reviewed the relevant imaging studies and available reports.  I also discussed the differential diagnosis and all of the proposed management plans with the patient and individuals accompanying the patient to this visit.  They had the opportunity to ask questions about the proposed management plans and to have those questions answered.    This patient has a high probability of sudden, clinically significant deterioration, which requires the highest level of physician preparedness to intervene urgently.  I managed/supervised life or organ supporting interventions that required frequent physician assessment.  I devoted my full attention to the direct care of this patient for the amount of time indicated below.  Time I spent with family or surrogate(s) is included only if the patient was incapable of providing the necessary information or participating in medical decision-making.  Time devoted to teaching and to any procedures I billed separately is not included.  Critical Care Time: 31 minutes      Electronically signed by Day Croft MD on 9/29/2024 at 9:09 PM

## 2024-09-30 NOTE — PLAN OF CARE
Problem: Discharge Planning  Goal: Discharge to home or other facility with appropriate resources  Outcome: Progressing     Problem: Skin/Tissue Integrity  Goal: Absence of new skin breakdown  Description: 1.  Monitor for areas of redness and/or skin breakdown  2.  Assess vascular access sites hourly  3.  Every 4-6 hours minimum:  Change oxygen saturation probe site  4.  Every 4-6 hours:  If on nasal continuous positive airway pressure, respiratory therapy assess nares and determine need for appliance change or resting period.  9/30/2024 0925 by Johnson Lisa RN  Outcome: Progressing     Problem: Pain  Goal: Verbalizes/displays adequate comfort level or baseline comfort level  Outcome: Progressing     Problem: Chronic Conditions and Co-morbidities  Goal: Patient's chronic conditions and co-morbidity symptoms are monitored and maintained or improved  Outcome: Progressing

## 2024-09-30 NOTE — PROGRESS NOTES
OT SESSION ATTEMPT     Date:2024  Patient Name: Cuate Stone  MRN: [id number]  : 1950  Room: [id number]/JTVMLB32-11     Attempted OT session this date:    [] unavailable due to other medical staff currently with pt   [] unavailable per nursing staff recommendation    [] Unavailable per nursing staff secondary to lab / radiology results    [] pt declined due to ____.  Benefits of participation in therapy reviewed with pt.    [] off unit   [x] Other: Pt just extubated 30min ago. Nursing requested to hold off.     Will reattempt OT evaluation and/or treatment at a later time.    Andra Payton OTR/L; [id number]

## 2024-09-30 NOTE — ACP (ADVANCE CARE PLANNING)
Advance Care Planning   Healthcare Decision Maker:    Primary Decision Maker: Indra Tan - Legal Guardian, Legal Guardian - [phone]      Today we document the LEGAL GUARDIAN- Indra Tan- per [facility] site.       Electronically signed by Kelley Leon RN-BC on 9/30/2024 at 11:37 AM

## 2024-09-30 NOTE — PROGRESS NOTES
Nursing home was contacted regarding patients contact list. Nursing home papers do not have any contacts listed for the patient. Per nursing home patient does have a legal guardian, Indra Tan. Chart was updated with name and number.

## 2024-10-01 LAB
ALBUMIN SERPL-MCNC: 2.4 G/DL (ref 3.5–5.2)
ALP SERPL-CCNC: 88 U/L (ref 40–129)
ALT SERPL-CCNC: 9 U/L (ref 0–40)
ANION GAP SERPL CALCULATED.3IONS-SCNC: 5 MMOL/L (ref 7–16)
AST SERPL-CCNC: 22 U/L (ref 0–39)
BASOPHILS # BLD: 0 K/UL (ref 0–0.2)
BASOPHILS NFR BLD: 0 % (ref 0–2)
BILIRUB SERPL-MCNC: 0.8 MG/DL (ref 0–1.2)
BUN SERPL-MCNC: 51 MG/DL (ref 6–23)
CALCIUM SERPL-MCNC: 7.6 MG/DL (ref 8.6–10.2)
CHLORIDE SERPL-SCNC: 108 MMOL/L (ref 98–107)
CO2 SERPL-SCNC: 32 MMOL/L (ref 22–29)
CREAT SERPL-MCNC: 1.6 MG/DL (ref 0.7–1.2)
DATE LAST DOSE: NORMAL
EOSINOPHIL # BLD: 0.18 K/UL (ref 0.05–0.5)
EOSINOPHILS RELATIVE PERCENT: 1 % (ref 0–6)
ERYTHROCYTE [DISTWIDTH] IN BLOOD BY AUTOMATED COUNT: 15.7 % (ref 11.5–15)
GFR, ESTIMATED: 46 ML/MIN/1.73M2
GLUCOSE BLD-MCNC: 100 MG/DL (ref 74–99)
GLUCOSE BLD-MCNC: 100 MG/DL (ref 74–99)
GLUCOSE BLD-MCNC: 135 MG/DL (ref 74–99)
GLUCOSE BLD-MCNC: 97 MG/DL (ref 74–99)
GLUCOSE BLD-MCNC: 99 MG/DL (ref 74–99)
GLUCOSE SERPL-MCNC: 146 MG/DL (ref 74–99)
HCT VFR BLD AUTO: 33.7 % (ref 37–54)
HCT VFR BLD AUTO: 33.8 % (ref 37–54)
HCT VFR BLD AUTO: 35.4 % (ref 37–54)
HGB BLD-MCNC: 10.8 G/DL (ref 12.5–16.5)
HGB BLD-MCNC: 10.9 G/DL (ref 12.5–16.5)
HGB BLD-MCNC: 11.1 G/DL (ref 12.5–16.5)
LACTATE BLDV-SCNC: 1.3 MMOL/L (ref 0.5–2.2)
LYMPHOCYTES NFR BLD: 0.55 K/UL (ref 1.5–4)
LYMPHOCYTES RELATIVE PERCENT: 3 % (ref 20–42)
MAGNESIUM SERPL-MCNC: 2.1 MG/DL (ref 1.6–2.6)
MCH RBC QN AUTO: 30.2 PG (ref 26–35)
MCHC RBC AUTO-ENTMCNC: 32 G/DL (ref 32–34.5)
MCV RBC AUTO: 94.1 FL (ref 80–99.9)
MICROORGANISM SPEC CULT: ABNORMAL
MONOCYTES NFR BLD: 1.83 K/UL (ref 0.1–0.95)
MONOCYTES NFR BLD: 10 % (ref 2–12)
NEUTROPHILS NFR BLD: 86 % (ref 43–80)
NEUTS SEG NFR BLD: 15.74 K/UL (ref 1.8–7.3)
PHOSPHATE SERPL-MCNC: 1.9 MG/DL (ref 2.5–4.5)
PLATELET, FLUORESCENCE: 139 K/UL (ref 130–450)
PMV BLD AUTO: 13.9 FL (ref 7–12)
POTASSIUM SERPL-SCNC: 4.3 MMOL/L (ref 3.5–5)
PROT SERPL-MCNC: 5 G/DL (ref 6.4–8.3)
RBC # BLD AUTO: 3.58 M/UL (ref 3.8–5.8)
SERVICE CMNT-IMP: ABNORMAL
SODIUM SERPL-SCNC: 145 MMOL/L (ref 132–146)
SPECIMEN DESCRIPTION: ABNORMAL
TME LAST DOSE: NORMAL H
VANCOMYCIN DOSE: NORMAL MG
VANCOMYCIN SERPL-MCNC: 14.8 UG/ML (ref 5–40)
WBC OTHER # BLD: 18.3 K/UL (ref 4.5–11.5)

## 2024-10-01 PROCEDURE — 6360000002 HC RX W HCPCS: Performed by: STUDENT IN AN ORGANIZED HEALTH CARE EDUCATION/TRAINING PROGRAM

## 2024-10-01 PROCEDURE — 6370000000 HC RX 637 (ALT 250 FOR IP): Performed by: INTERNAL MEDICINE

## 2024-10-01 PROCEDURE — 97165 OT EVAL LOW COMPLEX 30 MIN: CPT

## 2024-10-01 PROCEDURE — 6360000002 HC RX W HCPCS: Performed by: INTERNAL MEDICINE

## 2024-10-01 PROCEDURE — 80053 COMPREHEN METABOLIC PANEL: CPT

## 2024-10-01 PROCEDURE — 2500000003 HC RX 250 WO HCPCS: Performed by: INTERNAL MEDICINE

## 2024-10-01 PROCEDURE — 6360000002 HC RX W HCPCS

## 2024-10-01 PROCEDURE — 1200000000 HC SEMI PRIVATE

## 2024-10-01 PROCEDURE — 85025 COMPLETE CBC W/AUTO DIFF WBC: CPT

## 2024-10-01 PROCEDURE — 80202 ASSAY OF VANCOMYCIN: CPT

## 2024-10-01 PROCEDURE — 97110 THERAPEUTIC EXERCISES: CPT

## 2024-10-01 PROCEDURE — 2580000003 HC RX 258: Performed by: INTERNAL MEDICINE

## 2024-10-01 PROCEDURE — 84100 ASSAY OF PHOSPHORUS: CPT

## 2024-10-01 PROCEDURE — 83605 ASSAY OF LACTIC ACID: CPT

## 2024-10-01 PROCEDURE — 36415 COLL VENOUS BLD VENIPUNCTURE: CPT

## 2024-10-01 PROCEDURE — 99231 SBSQ HOSP IP/OBS SF/LOW 25: CPT

## 2024-10-01 PROCEDURE — 97530 THERAPEUTIC ACTIVITIES: CPT

## 2024-10-01 PROCEDURE — 99222 1ST HOSP IP/OBS MODERATE 55: CPT | Performed by: SURGERY

## 2024-10-01 PROCEDURE — 85018 HEMOGLOBIN: CPT

## 2024-10-01 PROCEDURE — 94640 AIRWAY INHALATION TREATMENT: CPT

## 2024-10-01 PROCEDURE — 82962 GLUCOSE BLOOD TEST: CPT

## 2024-10-01 PROCEDURE — 2580000003 HC RX 258

## 2024-10-01 PROCEDURE — 97161 PT EVAL LOW COMPLEX 20 MIN: CPT

## 2024-10-01 PROCEDURE — 2580000003 HC RX 258: Performed by: STUDENT IN AN ORGANIZED HEALTH CARE EDUCATION/TRAINING PROGRAM

## 2024-10-01 PROCEDURE — 85014 HEMATOCRIT: CPT

## 2024-10-01 PROCEDURE — 99233 SBSQ HOSP IP/OBS HIGH 50: CPT | Performed by: INTERNAL MEDICINE

## 2024-10-01 PROCEDURE — 83735 ASSAY OF MAGNESIUM: CPT

## 2024-10-01 RX ORDER — POTASSIUM CHLORIDE 7.45 MG/ML
10 INJECTION INTRAVENOUS PRN
Status: DISCONTINUED | OUTPATIENT
Start: 2024-10-01 | End: 2024-10-04 | Stop reason: HOSPADM

## 2024-10-01 RX ORDER — POTASSIUM CHLORIDE 1500 MG/1
40 TABLET, EXTENDED RELEASE ORAL PRN
Status: DISCONTINUED | OUTPATIENT
Start: 2024-10-01 | End: 2024-10-04 | Stop reason: HOSPADM

## 2024-10-01 RX ORDER — FERROUS SULFATE 300 MG/5ML
220 LIQUID (ML) ORAL DAILY
Status: DISCONTINUED | OUTPATIENT
Start: 2024-10-01 | End: 2024-10-04 | Stop reason: HOSPADM

## 2024-10-01 RX ORDER — METOPROLOL TARTRATE 25 MG/1
25 TABLET, FILM COATED ORAL 2 TIMES DAILY
Status: DISCONTINUED | OUTPATIENT
Start: 2024-10-01 | End: 2024-10-04 | Stop reason: HOSPADM

## 2024-10-01 RX ORDER — ALBUTEROL SULFATE 0.83 MG/ML
2.5 SOLUTION RESPIRATORY (INHALATION) EVERY 6 HOURS PRN
Status: DISCONTINUED | OUTPATIENT
Start: 2024-10-01 | End: 2024-10-04 | Stop reason: HOSPADM

## 2024-10-01 RX ORDER — SODIUM CHLORIDE 9 MG/ML
INJECTION, SOLUTION INTRAVENOUS CONTINUOUS
Status: DISCONTINUED | OUTPATIENT
Start: 2024-10-01 | End: 2024-10-02

## 2024-10-01 RX ORDER — BUDESONIDE 0.5 MG/2ML
500 INHALANT ORAL
Status: DISCONTINUED | OUTPATIENT
Start: 2024-10-01 | End: 2024-10-04 | Stop reason: HOSPADM

## 2024-10-01 RX ORDER — HYDROCODONE BITARTRATE AND ACETAMINOPHEN 5; 325 MG/1; MG/1
1 TABLET ORAL EVERY 6 HOURS PRN
Status: DISCONTINUED | OUTPATIENT
Start: 2024-10-01 | End: 2024-10-04 | Stop reason: HOSPADM

## 2024-10-01 RX ORDER — IPRATROPIUM BROMIDE AND ALBUTEROL SULFATE 2.5; .5 MG/3ML; MG/3ML
1 SOLUTION RESPIRATORY (INHALATION)
Status: DISCONTINUED | OUTPATIENT
Start: 2024-10-01 | End: 2024-10-04 | Stop reason: HOSPADM

## 2024-10-01 RX ORDER — MAGNESIUM SULFATE IN WATER 40 MG/ML
2000 INJECTION, SOLUTION INTRAVENOUS PRN
Status: DISCONTINUED | OUTPATIENT
Start: 2024-10-01 | End: 2024-10-04 | Stop reason: HOSPADM

## 2024-10-01 RX ADMIN — SODIUM CHLORIDE, PRESERVATIVE FREE 40 MG: 5 INJECTION INTRAVENOUS at 16:49

## 2024-10-01 RX ADMIN — IPRATROPIUM BROMIDE AND ALBUTEROL SULFATE 1 DOSE: .5; 2.5 SOLUTION RESPIRATORY (INHALATION) at 18:43

## 2024-10-01 RX ADMIN — MINERAL SUPPLEMENT IRON 300 MG / 5 ML STRENGTH LIQUID 100 PER BOX UNFLAVORED 220 MG: at 09:52

## 2024-10-01 RX ADMIN — METOPROLOL TARTRATE 25 MG: 25 TABLET, FILM COATED ORAL at 09:53

## 2024-10-01 RX ADMIN — SODIUM CHLORIDE: 9 INJECTION, SOLUTION INTRAVENOUS at 08:28

## 2024-10-01 RX ADMIN — BUDESONIDE 500 MCG: 0.5 INHALANT RESPIRATORY (INHALATION) at 18:43

## 2024-10-01 RX ADMIN — IPRATROPIUM BROMIDE AND ALBUTEROL SULFATE 1 DOSE: .5; 2.5 SOLUTION RESPIRATORY (INHALATION) at 09:41

## 2024-10-01 RX ADMIN — OCTREOTIDE ACETATE 50 MCG/HR: 500 INJECTION, SOLUTION INTRAVENOUS; SUBCUTANEOUS at 20:49

## 2024-10-01 RX ADMIN — METRONIDAZOLE 500 MG: 500 INJECTION, SOLUTION INTRAVENOUS at 04:26

## 2024-10-01 RX ADMIN — METRONIDAZOLE 500 MG: 500 INJECTION, SOLUTION INTRAVENOUS at 11:40

## 2024-10-01 RX ADMIN — SODIUM CHLORIDE: 9 INJECTION, SOLUTION INTRAVENOUS at 20:49

## 2024-10-01 RX ADMIN — SODIUM BICARBONATE: 84 INJECTION, SOLUTION INTRAVENOUS at 04:57

## 2024-10-01 RX ADMIN — BUDESONIDE 500 MCG: 0.5 INHALANT RESPIRATORY (INHALATION) at 09:41

## 2024-10-01 RX ADMIN — VANCOMYCIN HYDROCHLORIDE 1250 MG: 10 INJECTION, POWDER, LYOPHILIZED, FOR SOLUTION INTRAVENOUS at 08:52

## 2024-10-01 RX ADMIN — IPRATROPIUM BROMIDE AND ALBUTEROL SULFATE 1 DOSE: .5; 2.5 SOLUTION RESPIRATORY (INHALATION) at 13:36

## 2024-10-01 RX ADMIN — SODIUM CHLORIDE, PRESERVATIVE FREE 40 MG: 5 INJECTION INTRAVENOUS at 06:52

## 2024-10-01 RX ADMIN — METRONIDAZOLE 500 MG: 500 INJECTION, SOLUTION INTRAVENOUS at 20:53

## 2024-10-01 RX ADMIN — CEFEPIME 2000 MG: 2 INJECTION, POWDER, FOR SOLUTION INTRAVENOUS at 16:49

## 2024-10-01 RX ADMIN — OCTREOTIDE ACETATE 50 MCG/HR: 500 INJECTION, SOLUTION INTRAVENOUS; SUBCUTANEOUS at 08:50

## 2024-10-01 RX ADMIN — CEFEPIME 2000 MG: 2 INJECTION, POWDER, FOR SOLUTION INTRAVENOUS at 04:28

## 2024-10-01 ASSESSMENT — PAIN SCALES - GENERAL
PAINLEVEL_OUTOF10: 0
PAINLEVEL_OUTOF10: 0

## 2024-10-01 NOTE — PROGRESS NOTES
Internal Medicine Progress Note     [facility]     Antoine Hodges D.O., LEIGHANN Freire D.O., LEIGHANN Jiang D.O.     Usha Gomez, MSN, APRN, NP-C  Erlin Contreras, MSN, APRN-CNP  Willis Rosales, MSN, APRN, NP-C  Cheryl Quiroz, MSN, APRN-CNP  Onelia Young, MSN, APRN, NP-C     Primary Care Physician: Awadalla, Bahaa A, MD   Admitting Physician:  Antoine Hodges DO  Admission date and time: 9/29/2024  2:58 PM    Room:  [address] [place] [address]  Admitting diagnosis: Shock [R57.9]  Respiratory distress [R06.03]  Coffee ground emesis [K92.0]  Atrial fibrillation with rapid ventricular response (HCC) [I48.91]  Acute respiratory failure with hypoxia and hypercapnia [J96.01, J96.02]  Gastrointestinal hemorrhage, unspecified gastrointestinal hemorrhage type [K92.2]    Patient Name: Cuate Stone  MRN: [id number]    Date of Service: 10/1/2024     Subjective:  Cuate is a 73 y.o. male who was seen and examined today,10/1/2024, at the bedside.  As the day progressed yesterday, the patient's legal guardian was contacted and requested the patient be transition back to DNC with extubation.  He was extubated successfully and is now maintained on nasal cannula oxygen.  PEG tube is to gravity with no outward signs of bleeding.  He is maintained on antibiotics and is feeling better overall.    Review of System:   Constitutional:   Generalized malaise and fatigue.  HEENT:   Denies ear pain, sore throat, sinus or eye problems.  Nasal cannula oxygen is in place.  Cardiovascular:   Denies any chest pain, irregular heartbeats, or palpitations.   Respiratory:   Acute on chronic shortness of breath with intermittent coughing.  No hemoptysis.  Gastrointestinal:   PEG tube is in place.  He has had no additional GI bleeding.  He has no current abdominal pain.  Genitourinary:    Denies any urgency, frequency, hematuria.  Voiding with the use of a catheter.  Extremities:   Denies  Thin skin with easy bruisability.  Genitalia/Breast:  Voiding with the use of a Devine catheter     Medication:  Scheduled Meds:   cefepime  2,000 mg IntraVENous Q12H    vancomycin (VANCOCIN) intermittent dosing (placeholder)   Other RX Placeholder    metroNIDAZOLE  500 mg IntraVENous Q8H    midodrine  15 mg Oral TID    pantoprazole (PROTONIX) 40 mg in sodium chloride (PF) 0.9 % 10 mL injection  40 mg IntraVENous Q12H     Continuous Infusions:   sodium bicarbonate 150 mEq in dextrose 5 % 1,000 mL infusion 100 mL/hr at 10/01/24 0457    dexmedeTOMIDine HCl in NaCl Stopped (09/30/24 1306)    dextrose      octreotide (SANDOSTATIN) 500 mcg in sodium chloride 0.9 % 100 mL infusion 50 mcg/hr (09/30/24 2141)    norepinephrine Stopped (09/30/24 1447)       Objective Data:  CBC with Differential:    Lab Results   Component Value Date/Time    WBC 27.9 09/30/2024 03:02 AM    RBC 4.82 09/30/2024 03:02 AM    HGB 10.9 10/01/2024 05:37 AM    HCT 33.8 10/01/2024 05:37 AM     09/30/2024 03:02 AM    MCV 92.3 09/30/2024 03:02 AM    MCH 30.1 09/30/2024 03:02 AM    MCHC 32.6 09/30/2024 03:02 AM    RDW 15.3 09/30/2024 03:02 AM    NRBC 2 09/06/2015 06:45 PM    LYMPHOPCT 7 09/30/2024 03:02 AM    MONOPCT 9 09/30/2024 03:02 AM    EOSPCT 0 09/30/2024 03:02 AM    BASOPCT 0 09/30/2024 03:02 AM    MONOSABS 2.60 09/30/2024 03:02 AM    LYMPHSABS 1.89 09/30/2024 03:02 AM    EOSABS 0.00 09/30/2024 03:02 AM    BASOSABS 0.00 09/30/2024 03:02 AM     BMP:    Lab Results   Component Value Date/Time     10/01/2024 05:37 AM    K 4.3 10/01/2024 05:37 AM    K 4.5 07/20/2022 06:53 PM     10/01/2024 05:37 AM    CO2 32 10/01/2024 05:37 AM    BUN 51 10/01/2024 05:37 AM    CREATININE 1.6 10/01/2024 05:37 AM    CALCIUM 7.6 10/01/2024 05:37 AM    GFRAA >60 09/25/2022 03:27 PM    LABGLOM 46 10/01/2024 05:37 AM    GLUCOSE 146 10/01/2024 05:37 AM       Assessment:  Septic shock secondary to aspiration pneumonia  GI bleed with persistent coffee-ground

## 2024-10-01 NOTE — PROGRESS NOTES
Physical Therapy Initial Evaluation/Plan of Care    Room #:  0338/0338-02  Patient Name: Cuate Stone  YOB: 1950  MRN: [id number]    Date of Service: 10/1/2024     Tentative placement recommendation: Skilled Nursing Facility with Physical Therapy   Equipment recommendation: Equipment at Nursing Home      Evaluating Physical Therapist: Ricki Martin, PT #[id number]      Specific Provider Orders/Date/Referring Provider :   09/30/24 0515    PT eval and treat  Start:  09/30/24 0515,   End:  09/30/24 0515,   ONE TIME,   Standing Count:  1 Occurrences,   R       Kathy, Ismail U, DO     Admitting Diagnosis:   Shock [R57.9]  Respiratory distress [R06.03]  Coffee ground emesis [K92.0]  Atrial fibrillation with rapid ventricular response (HCC) [I48.91]  Acute respiratory failure with hypoxia and hypercapnia [J96.01, J96.02]  Gastrointestinal hemorrhage, unspecified gastrointestinal hemorrhage type [K92.2]      Surgery: none  Visit Diagnoses         Codes    Coffee ground emesis     K92.0    Shock     R57.9    Gastrointestinal hemorrhage, unspecified gastrointestinal hemorrhage type     K92.2    Respiratory distress     R06.03    Atrial fibrillation with rapid ventricular response (HCC)     I48.91            Patient Active Problem List   Diagnosis    Inguinal hernia    Acute renal failure (HCC)    Acute cholecystitis    Acute blood loss anemia    Acquired hypothyroidism    Gross hematuria    Hypertension    History of stroke    Respiratory failure    Severe protein-calorie malnutrition (HCC)    Acute respiratory failure with hypoxia and hypercapnia    Palliative care encounter        ASSESSMENT of Current Deficits Patient exhibits decreased strength, balance, and endurance impairing functional mobility, transfers, gait distance, and tolerance to activity. R hemiparesis from previous CVA. PEG tube to gravity. Max a x2 for bed mobility, max A for balance sitting EOB. The patient will benefit from continued skilled        SUBJECTIVE:    Precautions: Use lift equipment for lifting patient , falls R hemiparesis from previous CVA, peg tube to gravity, shipman, 2 IV sites on Left arm, usually wears O2 at nursing home but not sure how much     Social history: Patient from LTC at [facility]    Equipment owned: Equipment at Nursing Home,      AM-PAC Basic Mobility       AM-PAC Basic Mobility - Inpatient   How much help is needed turning from your back to your side while in a flat bed without using bedrails?: A Lot  How much help is needed moving from lying on your back to sitting on the side of a flat bed without using bedrails?: A Lot  How much help is needed moving to and from a bed to a chair?: Total  How much help is needed standing up from a chair using your arms?: Total  How much help is needed walking in hospital room?: Total  How much help is needed climbing 3-5 steps with a railing?: Total  AM-PAC Inpatient Mobility Raw Score : 8  AM-PAC Inpatient T-Scale Score : 28.52  Mobility Inpatient CMS 0-100% Score: 86.62  Mobility Inpatient CMS G-Code Modifier : CM    Nursing cleared patient for PT evaluation. The admitting diagnosis and active problem list as listed above have been reviewed prior to the initiation of this evaluation.    OBJECTIVE:   Initial Evaluation  Date: 10/1/2024 Treatment Date:     Short Term/ Long Term   Goals   Was pt agreeable to Eval/treatment? Yes  To be met in 4 days   Pain level   0/10       Bed Mobility  Using rails and head of bed elevated:     Rolling: Maximal assist of  2    Supine to sit: Maximal assist of  2    Sit to supine: Maximal assist of  2    Scooting: Maximal assist of  2    Rolling: Moderate assist of 1    Supine to sit: Moderate assist of 1    Sit to supine: Moderate assist of 1    Scooting: Moderate assist of 1     Transfers Sit to stand: Not assessed due to pt overall debility, decreased activity tolerance, balance deficits, safety and fall risk.      Sit to stand: Maximal assist of  2    ROM

## 2024-10-01 NOTE — PLAN OF CARE
Problem: Safety - Adult  Goal: Free from fall injury  Outcome: Progressing     Problem: Discharge Planning  Goal: Discharge to home or other facility with appropriate resources  Outcome: Progressing     Problem: Skin/Tissue Integrity  Goal: Absence of new skin breakdown  Description: 1.  Monitor for areas of redness and/or skin breakdown  2.  Assess vascular access sites hourly  3.  Every 4-6 hours minimum:  Change oxygen saturation probe site  4.  Every 4-6 hours:  If on nasal continuous positive airway pressure, respiratory therapy assess nares and determine need for appliance change or resting period.  Outcome: Progressing     Problem: Safety - Medical Restraint  Goal: Remains free of injury from restraints (Restraint for Interference with Medical Device)  Description: INTERVENTIONS:  1. Determine that other, less restrictive measures have been tried or would not be effective before applying the restraint  2. Evaluate the patient's condition at the time of restraint application  3. Inform patient/family regarding the reason for restraint  4. Q2H: Monitor safety, psychosocial status, comfort, nutrition and hydration  Outcome: Progressing  Flowsheets  Taken 9/30/2024 1229 by Johnson Lisa RN  Remains free of injury from restraints (restraint for interference with medical device): Every 2 hours: Monitor safety, psychosocial status, comfort, nutrition and hydration  Taken 9/30/2024 1200 by Johnson Lisa RN  Remains free of injury from restraints (restraint for interference with medical device): Every 2 hours: Monitor safety, psychosocial status, comfort, nutrition and hydration     Problem: Pain  Goal: Verbalizes/displays adequate comfort level or baseline comfort level  Outcome: Progressing     Problem: Chronic Conditions and Co-morbidities  Goal: Patient's chronic conditions and co-morbidity symptoms are monitored and maintained or improved  Outcome: Progressing

## 2024-10-01 NOTE — PROGRESS NOTES
Pharmacy Consultation Note  (Antibiotic Dosing and Monitoring)    Initial consult date: 9/30/2024  Consulting physician/provider: Dr. Chavez  Drug: Vancomycin  Indication: UTI x 7 days    Age/  Gender Height Weight IBW  Allergy Information   73 y.o./male 180.3 cm (5' 11\") 77.1 kg (170 lb)     Ideal body weight: 75.3 kg (166 lb 0.1 oz)  Adjusted ideal body weight: 77.3 kg (170 lb 5.2 oz)   Elemental sulfur and Penicillins      Renal Function:  Recent Labs     09/30/24  0257 09/30/24  1020 10/01/24  0537   BUN 63* 63* 51*   CREATININE 1.9* 1.8* 1.6*       Intake/Output Summary (Last 24 hours) at 10/1/2024 0800  Last data filed at 10/1/2024 0430  Gross per 24 hour   Intake 1452.11 ml   Output 1325 ml   Net 127.11 ml       Vancomycin Monitoring:  Trough:  No results for input(s): \"VANCOTROUGH\" in the last 72 hours.  Random:    Recent Labs     10/01/24  0537   VANCORANDOM 14.8       Vancomycin Administration Times:  Recent vancomycin administrations                     vancomycin (VANCOCIN) 2000 mg in 0.9% sodium chloride 500 mL IVPB (mg) 2,000 mg New Bag 09/30/24 0629                    Assessment:  Patient is a 73 y.o. male who has been initiated on vancomycin  Estimated Creatinine Clearance: 44 mL/min (A) (based on SCr of 1.6 mg/dL (H)).  To dose vancomycin, pharmacy will be utilizing dosing based off of levels because of patient's renal impairment/insufficiency  10/1: Random level = 14.8 mcg/mL    Plan:  Re-dose vancomycin 1250 mg IV x 1 dose  Random level tomorrow with AM labs  Will continue to monitor renal function   Pharmacy to follow      Gillian Bangura, PharmD, BCPS 10/1/2024 8:00 AM   Ext: [phone]    SJW: [phone]

## 2024-10-01 NOTE — CONSULTS
CONSULTATION NOTE :ID     Patient - Cuate Stone,  Age - 73 y.o.    - 1950      Room Number - 0338/0338-02   N -  [id number]   [facility] # - [id number]  Date of Admission -  2024  2:58 PM  Patient's PCP: Awadalla, Bahaa A, MD     Requesting Physician: Antoine Hodges DO    REASON FOR CONSULTATION   atbx  HISTORY OF PRESENT ILLNESS   This is a very pleasant 73 y.o. male who was admitted on 2024   to the hospital with a chief complaints of   Chief Complaint   Patient presents with    Shortness of Breath    GI Problem     Coffee ground emesis     ID was consulted on 10/01/24  Came in with coffee ground emesis and abd pain   Afebrile tachy episodes of hypotension   Cr1.6->1.9->1.6   Wbc14.6->27.9->18.3   Blood cx CONS   Urien cx mixed gnr   Resp cx GNR/S aureus   G tube  S aureus     CTA PULMONARY W CONTRAST    Result Date: 2024  1. No sign of extravasation of injected intravenous contrast into the bowel to correlate with a history of GI bleed and coffee ground emesis. 2. New small right pleural effusion. 3. Moderate consolidative patchy alveolar infiltrate in the infrahilar left lower lobe. Moderate patchy alveolar infiltrates scattered throughout the right lower lobe. Milder patchy alveolar infiltrate in the dependent portion of the right upper lobe. This combination of it infiltrates is suggestive of aspiration pneumonia, although other multifocal pneumonia could have this appearance. 4. Free gastroesophageal reflux. 5. Increase in size of the left staghorn calculi, with prominently increased size of the calculus in the lower pole calices, now filling the lower pole collecting system. Slight increase in size of the calculi in the upper pole calices. 6. Slight increase in size of the nonobstructive calculi in the upper pole of the right kidney, remaining mild, up to 7 mm in length. 7. Stable severe descending and sigmoid diverticulosis with no sign of diverticulitis. 8.   SARS-CoV-2, PCR Not Detected     Human Metapneumovirus PCR Not Detected     Rhino/Enterovirus PCR Not Detected     Influenza A by PCR Not Detected     Influenza B by PCR Not Detected     Parainfluenza 1 PCR Not Detected     Parainfluenza 2 PCR Not Detected     Parainfluenza 3 PCR Not Detected     Parainfluenza 4 PCR Not Detected     Resp Syncytial Virus PCR Not Detected     Bordetella parapertussis by PCR Not Detected     B Pertussis by PCR Not Detected     Chlamydia pneumoniae By PCR Not Detected     Mycoplasma pneumo by PCR Not Detected     Comment: Performed by multiplexed nucleic acid assay.               Problem list of patient      Patient Active Problem List   Diagnosis Code    Inguinal hernia K40.90    Acute renal failure (HCC) N17.9    Acute cholecystitis K81.0    Acute blood loss anemia D62    Acquired hypothyroidism E03.9    Gross hematuria R31.0    Hypertension I10    History of stroke Z86.73    Respiratory failure J96.90    Severe protein-calorie malnutrition (HCC) E43    Acute respiratory failure with hypoxia and hypercapnia J96.01, J96.02    Palliative care encounter Z51.5         ASSESSMENT AND PLAN   Leukocytosis  CONS bacteremia   Aspiration pneumonia   GIB   staghorn calculi,   H/o CVA   dnrcc        ceFEPIme (MAXIPIME) 2,000 mg in sodium chloride 0.9 % 100 mL IVPB (mini-bag), Q12H  vancomycin (VANCOCIN) intermittent dosing (placeholder), RX Placeholder  metroNIDAZOLE (FLAGYL) 500 mg in 0.9% NaCl 100 mL IVPB premix, Q8H           Thank you Antoine Hodges DO for allowing me to participate in this patient's care.  Please call [phone]  for any questions or concerns.    Electronically signed by Nadine Bolaños MD on 10/1/24 at 11:35 AM EDT

## 2024-10-01 NOTE — PROGRESS NOTES
Contacted lab due to results from 2030 have not results per lab department the labs were results for 0430 this morning. Per lab she put in for it to be corrected. H&H results 11.7 and lactic acid 2.3 for 2030 labs

## 2024-10-01 NOTE — FLOWSHEET NOTE
Inpatient Wound Care    Admit Date: 9/29/2024  2:58 PM    Reason for consult:  skin care    Significant history:    Past Medical History:   Diagnosis Date    Acquired hypothyroidism 03/09/2016    Acute renal failure (HCC)     Arthritis     Asthma     History of cardiovascular stress test 03/23/2016    lexiscan    Hypertension     Stroke (cerebrum) (HCC)        Wound history:      Findings:     10/01/24 1132   Skin Integrity Site 2   Skin Integrity Location 2 Redness   Location 2 coccyx         Impression:  redness coccyx    Interventions in place:  using mepile    Plan:  Cont with mepilex  Heel protectors      Mayte Woodard RN 10/1/2024 11:32 AM

## 2024-10-01 NOTE — CARE COORDINATION
DNR CC noted.  No immediate plan for intervention from GI POV.  Okay to restart tube feedings from GI POV.  Will see as needed in the hospital -please, call with questions/concerns  Thank you    Wilmer Willingham MD

## 2024-10-01 NOTE — PLAN OF CARE
Problem: Safety - Adult  Goal: Free from fall injury  10/1/2024 0735 by Shirley Vallejo, RN  Outcome: Progressing  10/1/2024 0117 by Elana Treviño, RN  Outcome: Progressing

## 2024-10-01 NOTE — PROGRESS NOTES
Assessment and Plan  Patient is a 73 y.o. male with the following medical Problems:   Acute hypoxic respiratory failure requiring intubation mechanical ventilation  Upper GI bleeding  Severe sepsis with septic shock requiring Levophed  Urinary tract infection  Aspiration pneumonia  Metabolic encephalopathy  Acute kidney injury  Heart failure with preserved ejection fraction  History of nicotine    Plan of care:  Broad-spectrum antibiotic with cefepime, Flagyl AND vancomycin  Protonix twice a day  Goals of care discussion with the POA.  Patient CODE STATUS changed to DNR CC.  GI consult  SCDs for DVT prophylaxis  Serial H&H.  Palliative care      History of Present Illness:   History is limited due to patient's medical condition.  History is mostly obtained from the chart  Patient is a 73-year-old man with  peptic ulcer disease, who presents with shortness of breath and coffee-ground emesis.  Patient was seen earlier this morning at 0442 for complaints of coffee-ground emesis and abdominal pain.  He has per chart review a past history of stroke, right-sided hemiparesis, dysarthria, and dysphagia.  Patient is coming from a nursing facility and DNRCC paperwork was with him earlier this morning.  Patient returns today with similar complaints on top of shortness of breath.  Patient was hypotensive, tachycardic to the 130s.  Patient return to the emergency department given the emergent situation of hypotensive and tachycardia, the patient was asked multiple times about his DNRCC status, the patient was alert and oriented and capable of making his own decisions, patient was asked multiple times about chest compressions and putting a tube down his throat to help him breathe, patient states he wants all interventions to save his life.     Daily progress:  September 30, 2024: Patient continues to be sedated, intubated, mechanically ventilated.  Kidney function is worsening.  Patient had 420 cc of urine output over the last 24  standard drinks of alcohol    Drug use: No    Sexual activity: Not Currently   Other Topics Concern    Not on file   Social History Narrative    Not on file     Social Determinants of Health     Financial Resource Strain: Not on file   Food Insecurity: Patient Unable To Answer (9/30/2024)    Hunger Vital Sign     Worried About Running Out of Food in the Last Year: Patient unable to answer     Ran Out of Food in the Last Year: Patient unable to answer   Transportation Needs: Patient Unable To Answer (9/30/2024)    PRAPARE - Transportation     Lack of Transportation (Medical): Patient unable to answer     Lack of Transportation (Non-Medical): Patient unable to answer   Physical Activity: Not on file   Stress: Not on file   Social Connections: Patient Unable To Answer (10/1/2024)    Social Connections ([facility] HRSN)     If for any reason you need help with day-to-day activities such as bathing, preparing meals, shopping, managing finances, etc., do you get the help you need?: Not on file   Intimate Partner Violence: Not on file   Housing Stability: Patient Unable To Answer (9/30/2024)    Housing Stability Vital Sign     Unable to Pay for Housing in the Last Year: Patient unable to answer     Number of Times Moved in the Last Year: 0     Homeless in the Last Year: Patient unable to answer       Current Medications:     Current Facility-Administered Medications:     0.9 % sodium chloride infusion, , IntraVENous, Continuous, Jericho Freire DO, Last Rate: 75 mL/hr at 10/01/24 0828, New Bag at 10/01/24 0828    sodium phosphate 12.84 mmol in sodium chloride 0.9 % 250 mL IVPB, 0.16 mmol/kg, IntraVENous, PRN **OR** sodium phosphate 25.65 mmol in sodium chloride 0.9 % 250 mL IVPB, 0.32 mmol/kg, IntraVENous, PRN, Jericho Freire DO    magnesium sulfate 2000 mg in 50 mL IVPB premix, 2,000 mg, IntraVENous, PRN, Jericho Freire,     potassium chloride (KLOR-CON M) extended release tablet 40 mEq, 40 mEq, Oral, PRN **OR** potassium  present, soft, lax with no tenderness, no hepatosplenomegaly, and no masses  Extremities: No edema. Pulses are equally present.   Skin: intact, no rashes   Neurologic: Awake and follows command, +ve right-sided hemiplegia     Investigations:  Labs, radiological imaging and cardiac work up were personally reviewed and independently interpreted.        STAFF PHYSICIAN NOTE OF PERSONAL INVOLVEMENT IN CARE  As the attending physician, I certify that I personally reviewed the patient's history and personally examined the patient to confirm the physical findings described above, and that I reviewed the relevant imaging studies and available reports.  I also discussed the differential diagnosis and all of the proposed management plans with the patient and individuals accompanying the patient to this visit.  They had the opportunity to ask questions about the proposed management plans and to have those questions answered.      Electronically signed by Day Croft MD on 10/1/2024 at 12:36 PM

## 2024-10-01 NOTE — PROGRESS NOTES
OCCUPATIONAL THERAPY INITIAL EVALUATION    [facility]  [address] [place]        Date:10/1/2024                                                  Patient Name: Cuate Stone    MRN: [id number]    : 1950    Room: [address] [place] [address]      Evaluating OT: Margo Romo OTR/L; [id number]     Referring Provider and Specific Provider Orders/Date:      24  OT eval and treat  Start:  24,   End:  24,   ONE TIME,   Standing Count:  1 Occurrences,   R         Kathy, Ismail U, DO      Placement Recommendation: Subacute        Diagnosis:   1. Acute respiratory failure with hypoxia and hypercapnia    2. Coffee ground emesis    3. Shock    4. Gastrointestinal hemorrhage, unspecified gastrointestinal hemorrhage type    5. Respiratory distress    6. Atrial fibrillation with rapid ventricular response (HCC)         Surgery: none       Pertinent Medical History:       Past Medical History:   Diagnosis Date    Acquired hypothyroidism 2016    Acute renal failure (HCC)     Arthritis     Asthma     History of cardiovascular stress test 2016    lexiscan    Hypertension     Stroke (cerebrum) (HCC)          Past Surgical History:   Procedure Laterality Date    CHOLECYSTECTOMY  2015    HERNIA REPAIR      Dr Perkins       Precautions:  Fall Risk, PEG, hx of CVA with residual right sided weakness, joi lift for tranfers, O2       Assessment of current deficits:     [x] Functional mobility  [x]ADLs  [x] Strength               []Cognition    [x] Functional transfers   [x] IADLs         [] Safety Awareness   [x]Endurance    [x] Fine Coordination              [x] Balance      [] Vision/perception   []Sensation     [x]Gross Motor Coordination  [x] ROM  [] Delirium                   [x] Motor Control     OT PLAN OF CARE   OT POC based on physician orders, patient diagnosis and results of clinical assessment    Frequency/Duration 1-3  days/wk for 2 weeks PRN     Specific OT Treatment Interventions to include:   * Instruction/training on adapted ADL techniques and AE recommendations to increase functional independence within precautions       * Training on energy conservation strategies, correct breathing pattern and techniques to improve independence/tolerance for self-care routine  * Functional transfer/mobility training/DME recommendations for increased independence, safety, and fall prevention  * Patient/Family education to increase follow through with safety techniques and functional independence  * Recommendation of environmental modifications for increased safety with functional transfers/mobility and ADLs  * Therapeutic exercise to improve motor endurance, ROM, and functional strength for ADLs/functional transfers  * Therapeutic activities to facilitate/challenge dynamic balance, stand tolerance for increased safety and independence with ADLs  * Positioning to improve skin integrity, interaction with environment and functional independence    Recommended Adaptive Equipment: TBD at rehab     Home Living: pt came to us from [facility]      Equipment owned: SNF equipment    Prior Level of Function: Needs assistance with ADLs and IADLs; non-ambulatory, joi lift for transfers, wheelchair mobility with assistance     Pain Level: no pain; Nursing notified.      Cognition: A&O: 3/4; Follows 1 step directions   Memory: fair    Sequencing: fair    Problem solving: fair    Judgement/safety: fair     [facility]   AM-PAC Daily Activity - Inpatient   How much help is needed for putting on and taking off regular lower body clothing?: Total  How much help is needed for bathing (which includes washing, rinsing, drying)?: A Lot  How much help is needed for toileting (which includes using toilet, bedpan, or urinal)?: Total  How much help is needed for putting on and taking off regular upper body clothing?: A Lot  How much help is needed for  life.    Treatment: OT treatment provided this date includes:   Instruction/training on safety and adapted techniques for completion of ADLs   Instruction/training on safe functional mobility/transfer techniques   Instruction/training on energy conservation/work simplification for completion of ADLs   Instruction/training on proper positioning/alignment to prevent contractures     Rehab Potential: Good for established goals.      Patient / Family Goal: return to SNF      Patient and/or family were instructed on functional diagnosis, prognosis/goals and OT plan of care. Demonstrated good understanding.     Eval Complexity: Low    Time In: 9:40am   Time Out: 10:03am    Total Treatment Time: 8       Min Units   OT Eval Low 97165  X  1    OT Eval Medium 26038      OT Eval High 44670      OT Re-Eval 70134            ADL/Self Care 83511     Therapeutic Activities 61662  8   1    Therapeutic Ex 25293       Orthotic Management 46194       Manual [id number]     Neuro Re-Ed 83001       Non-Billable Time        Evaluation Time additionally includes thorough review of current medical information, gathering information on past medical history/social history and prior level of function, interpretation of standardized testing/informal observation of tasks, assessment of data and development of plan of care and goals.        Evaluating OT: Margo Romo OTR/L; [id number]

## 2024-10-01 NOTE — CARE COORDINATION
CM note: Tx from ICU.  Per ICU CM notes Pt was at [facility] (N) pta. Plans to return. Can return with pending Precert, Needs signed STEVAN at discharge. Pt with Legal Guardian Indra Tan.

## 2024-10-01 NOTE — PROGRESS NOTES
Palliative Care Department  [phone]  Palliative Care Progress Note  Provider Christin Del Rosario, APRN - CNP     Cuate Stone  [id number]  Hospital Day: 3  Date of Initial Consult: 9/30/2024  Referring Provider: Day Croft MD  Palliative Medicine was consulted for assistance with: Goals of care, end-stage disease    HPI:   Cuate tSone is a 73 y.o. with a medical history of  stroke, right-sided hemiparesis, dysarthria, and dysphagia who was admitted on 9/29/2024 from nursing facility with a CHIEF COMPLAINT of shortness of breath, coffee-ground emesis. Patient was seen earlier this morning at 0442 for complaints of coffee-ground emesis and abdominal pain.   Patient is coming from a nursing facility and [facility] paperwork was with him earlier this morning.  Patient returns today with similar complaints on top of shortness of breath. Patient was hypotensive, tachycardic to the 130s.  Patient return to the emergency department given the emergent situation of hypotensive and tachycardia.  The ED changed the patient's CODE STATUS to full code and the patient was intubated.  Although, the patient has a guardian and this was not discussed with him.  GI, general surgery consulted.  Palliative medicine consulted for further assistance.    ASSESSMENT/PLAN:     Pertinent Hospital Diagnoses     Septic shock secondary to aspiration pneumonia  GI bleed with persistent coffee-ground emesis output from PEG tube with resultant blood loss anemia  Acute renal failure with chronic indwelling Devine catheter  History of stroke with residual deficits of right chris paresis, dysartria, dysphagia   Chronic, compensated diastolic congestive heart failure  Hypothyroidism  Asthma  Essential hypertension with current hypotension.  GERD  History of tobacco abuse      Palliative Care Encounter / Counseling Regarding Goals of Care  Please see detailed goals of care discussion as below  At this time, Cuate Stone, Does Not have  capacity for medical decision-making.  Capacity is time limited and situation/question specific  During encounter Indra Tan was surrogate medical decision-maker  Outcome of goals of care meeting:   Continue conservative medical management  Continue DNR CC  Code status DNR-CC  Advanced Directives: no POA or living will in [facility]  Surrogate/Legal NOK:  Indra Tan (legal guardian) [phone]    Spiritual assessment: no spiritual distress identified  Bereavement and grief: to be determined  Referrals to: none today  SUBJECTIVE:     Current medical issues leading to Palliative Medicine involvement include   Active Hospital Problems    Diagnosis Date Noted    Palliative care encounter [Z51.5] 09/30/2024    Acute respiratory failure with hypoxia and hypercapnia [J96.01, J96.02] 09/29/2024       Details of Conversation:    Chart reviewed.  GI okay for patient to resume diet via PEG tube, has seen signed off.  Patient was extubated yesterday, and transferred out of ICU, remains on antibiotics.  Patient seen at the bedside, he was awake, alert to self, and location, disoriented to time and situation, telling me that he is feeling better and no sign of distress.  There were no family member present in the room.  From previous discussion with legal guardian, plan is to pursue conservative management to his current clinical presentation.  Per primary, plan is to PT OT to evaluate, and to de-escalate antibiotics based on cultures and sensitivities.  Goals of care has been established.  No further PM needs were identified.  Please reconsult if patient condition worsens, or new PM needs arises.      OBJECTIVE:   Prognosis: Guarded    Physical Exam:  /77   Pulse 85   Temp 98.2 °F (36.8 °C) (Oral)   Resp 16   Ht 1.803 m (5' 11\")   Wt 80.2 kg (176 lb 12.8 oz)   SpO2 99%   BMI 24.66 kg/m²   Constitutional: Chronically ill-appearing, intubated, sedated  Eyes: no scleral icterus, normal lids, no discharge  ENMT:

## 2024-10-02 LAB
ACB COMPLEX DNA BLD POS QL NAA+NON-PROBE: NOT DETECTED
ALBUMIN SERPL-MCNC: 2.4 G/DL (ref 3.5–5.2)
ALP SERPL-CCNC: 69 U/L (ref 40–129)
ALT SERPL-CCNC: 9 U/L (ref 0–40)
ANION GAP SERPL CALCULATED.3IONS-SCNC: 10 MMOL/L (ref 7–16)
AST SERPL-CCNC: 25 U/L (ref 0–39)
B FRAGILIS DNA BLD POS QL NAA+NON-PROBE: NOT DETECTED
BASOPHILS # BLD: 0.05 K/UL (ref 0–0.2)
BASOPHILS NFR BLD: 1 % (ref 0–2)
BILIRUB SERPL-MCNC: 0.8 MG/DL (ref 0–1.2)
BIOFIRE TEST COMMENT: ABNORMAL
BUN SERPL-MCNC: 40 MG/DL (ref 6–23)
C ALBICANS DNA BLD POS QL NAA+NON-PROBE: NOT DETECTED
C AURIS DNA BLD POS QL NAA+NON-PROBE: NOT DETECTED
C GATTII+NEOFOR DNA BLD POS QL NAA+N-PRB: NOT DETECTED
C GLABRATA DNA BLD POS QL NAA+NON-PROBE: NOT DETECTED
C KRUSEI DNA BLD POS QL NAA+NON-PROBE: NOT DETECTED
C PARAP DNA BLD POS QL NAA+NON-PROBE: NOT DETECTED
C TROPICLS DNA BLD POS QL NAA+NON-PROBE: NOT DETECTED
CALCIUM SERPL-MCNC: 7.5 MG/DL (ref 8.6–10.2)
CHLORIDE SERPL-SCNC: 113 MMOL/L (ref 98–107)
CO2 SERPL-SCNC: 26 MMOL/L (ref 22–29)
CREAT SERPL-MCNC: 1.3 MG/DL (ref 0.7–1.2)
DATE LAST DOSE: NORMAL
E CLOAC COMP DNA BLD POS NAA+NON-PROBE: NOT DETECTED
E COLI DNA BLD POS QL NAA+NON-PROBE: NOT DETECTED
E FAECALIS DNA BLD POS QL NAA+NON-PROBE: NOT DETECTED
E FAECIUM DNA BLD POS QL NAA+NON-PROBE: NOT DETECTED
ENTEROBACTERALES DNA BLD POS NAA+N-PRB: NOT DETECTED
EOSINOPHIL # BLD: 0.15 K/UL (ref 0.05–0.5)
EOSINOPHILS RELATIVE PERCENT: 2 % (ref 0–6)
ERYTHROCYTE [DISTWIDTH] IN BLOOD BY AUTOMATED COUNT: 15.4 % (ref 11.5–15)
GFR, ESTIMATED: 58 ML/MIN/1.73M2
GLUCOSE BLD-MCNC: 124 MG/DL (ref 74–99)
GLUCOSE BLD-MCNC: 73 MG/DL (ref 74–99)
GLUCOSE BLD-MCNC: 80 MG/DL (ref 74–99)
GLUCOSE BLD-MCNC: 87 MG/DL (ref 74–99)
GLUCOSE SERPL-MCNC: 74 MG/DL (ref 74–99)
GP B STREP DNA BLD POS QL NAA+NON-PROBE: NOT DETECTED
HAEM INFLU DNA BLD POS QL NAA+NON-PROBE: NOT DETECTED
HCT VFR BLD AUTO: 33.7 % (ref 37–54)
HCT VFR BLD AUTO: 34.5 % (ref 37–54)
HGB BLD-MCNC: 10.5 G/DL (ref 12.5–16.5)
HGB BLD-MCNC: 10.8 G/DL (ref 12.5–16.5)
IMM GRANULOCYTES # BLD AUTO: 0.05 K/UL (ref 0–0.58)
IMM GRANULOCYTES NFR BLD: 1 % (ref 0–5)
K OXYTOCA DNA BLD POS QL NAA+NON-PROBE: NOT DETECTED
KLEBSIELLA SP DNA BLD POS QL NAA+NON-PRB: NOT DETECTED
KLEBSIELLA SP DNA BLD POS QL NAA+NON-PRB: NOT DETECTED
L MONOCYTOG DNA BLD POS QL NAA+NON-PROBE: NOT DETECTED
LYMPHOCYTES NFR BLD: 0.6 K/UL (ref 1.5–4)
LYMPHOCYTES RELATIVE PERCENT: 6 % (ref 20–42)
MAGNESIUM SERPL-MCNC: 2.2 MG/DL (ref 1.6–2.6)
MCH RBC QN AUTO: 30.2 PG (ref 26–35)
MCHC RBC AUTO-ENTMCNC: 31.2 G/DL (ref 32–34.5)
MCV RBC AUTO: 96.8 FL (ref 80–99.9)
MECA+MECC ISLT/SPM QL: DETECTED
MICROORGANISM SPEC CULT: ABNORMAL
MICROORGANISM SPEC CULT: ABNORMAL
MICROORGANISM/AGENT SPEC: ABNORMAL
MONOCYTES NFR BLD: 1.08 K/UL (ref 0.1–0.95)
MONOCYTES NFR BLD: 11 % (ref 2–12)
N MEN DNA BLD POS QL NAA+NON-PROBE: NOT DETECTED
NEUTROPHILS NFR BLD: 81 % (ref 43–80)
NEUTS SEG NFR BLD: 8.11 K/UL (ref 1.8–7.3)
P AERUGINOSA DNA BLD POS NAA+NON-PROBE: NOT DETECTED
PHOSPHATE SERPL-MCNC: 2.4 MG/DL (ref 2.5–4.5)
PLATELET, FLUORESCENCE: 126 K/UL (ref 130–450)
PMV BLD AUTO: 12.9 FL (ref 7–12)
POTASSIUM SERPL-SCNC: 3.5 MMOL/L (ref 3.5–5)
PROT SERPL-MCNC: 5.2 G/DL (ref 6.4–8.3)
PROTEUS SP DNA BLD POS QL NAA+NON-PROBE: NOT DETECTED
RBC # BLD AUTO: 3.48 M/UL (ref 3.8–5.8)
S AUREUS DNA BLD POS QL NAA+NON-PROBE: NOT DETECTED
S AUREUS+CONS DNA BLD POS NAA+NON-PROBE: DETECTED
S EPIDERMIDIS DNA BLD POS QL NAA+NON-PRB: DETECTED
S LUGDUNENSIS DNA BLD POS QL NAA+NON-PRB: NOT DETECTED
S MALTOPHILIA DNA BLD POS QL NAA+NON-PRB: NOT DETECTED
S MARCESCENS DNA BLD POS NAA+NON-PROBE: NOT DETECTED
S PNEUM DNA BLD POS QL NAA+NON-PROBE: NOT DETECTED
S PYO DNA BLD POS QL NAA+NON-PROBE: NOT DETECTED
SALMONELLA DNA BLD POS QL NAA+NON-PROBE: NOT DETECTED
SERVICE CMNT-IMP: ABNORMAL
SODIUM SERPL-SCNC: 149 MMOL/L (ref 132–146)
SPECIMEN DESCRIPTION: ABNORMAL
STREPTOCOCCUS DNA BLD POS NAA+NON-PROBE: NOT DETECTED
TME LAST DOSE: NORMAL H
VANCOMYCIN DOSE: NORMAL MG
VANCOMYCIN SERPL-MCNC: 14.4 UG/ML (ref 5–40)
WBC OTHER # BLD: 10 K/UL (ref 4.5–11.5)

## 2024-10-02 PROCEDURE — 2580000003 HC RX 258: Performed by: INTERNAL MEDICINE

## 2024-10-02 PROCEDURE — 6360000002 HC RX W HCPCS

## 2024-10-02 PROCEDURE — 85018 HEMOGLOBIN: CPT

## 2024-10-02 PROCEDURE — 84100 ASSAY OF PHOSPHORUS: CPT

## 2024-10-02 PROCEDURE — 85014 HEMATOCRIT: CPT

## 2024-10-02 PROCEDURE — 85025 COMPLETE CBC W/AUTO DIFF WBC: CPT

## 2024-10-02 PROCEDURE — 6360000002 HC RX W HCPCS: Performed by: INTERNAL MEDICINE

## 2024-10-02 PROCEDURE — 6360000002 HC RX W HCPCS: Performed by: STUDENT IN AN ORGANIZED HEALTH CARE EDUCATION/TRAINING PROGRAM

## 2024-10-02 PROCEDURE — 80053 COMPREHEN METABOLIC PANEL: CPT

## 2024-10-02 PROCEDURE — 0DP6XUZ REMOVAL OF FEEDING DEVICE FROM STOMACH, EXTERNAL APPROACH: ICD-10-PCS | Performed by: INTERNAL MEDICINE

## 2024-10-02 PROCEDURE — 83735 ASSAY OF MAGNESIUM: CPT

## 2024-10-02 PROCEDURE — 80202 ASSAY OF VANCOMYCIN: CPT

## 2024-10-02 PROCEDURE — 6370000000 HC RX 637 (ALT 250 FOR IP): Performed by: CLINICAL NURSE SPECIALIST

## 2024-10-02 PROCEDURE — 94640 AIRWAY INHALATION TREATMENT: CPT

## 2024-10-02 PROCEDURE — 6360000002 HC RX W HCPCS: Performed by: CLINICAL NURSE SPECIALIST

## 2024-10-02 PROCEDURE — 2580000003 HC RX 258: Performed by: STUDENT IN AN ORGANIZED HEALTH CARE EDUCATION/TRAINING PROGRAM

## 2024-10-02 PROCEDURE — 2700000000 HC OXYGEN THERAPY PER DAY

## 2024-10-02 PROCEDURE — 0DH63UZ INSERTION OF FEEDING DEVICE INTO STOMACH, PERCUTANEOUS APPROACH: ICD-10-PCS | Performed by: INTERNAL MEDICINE

## 2024-10-02 PROCEDURE — 99233 SBSQ HOSP IP/OBS HIGH 50: CPT | Performed by: SURGERY

## 2024-10-02 PROCEDURE — 2580000003 HC RX 258

## 2024-10-02 PROCEDURE — 6370000000 HC RX 637 (ALT 250 FOR IP): Performed by: INTERNAL MEDICINE

## 2024-10-02 PROCEDURE — 2580000003 HC RX 258: Performed by: CLINICAL NURSE SPECIALIST

## 2024-10-02 PROCEDURE — 1200000000 HC SEMI PRIVATE

## 2024-10-02 PROCEDURE — 99232 SBSQ HOSP IP/OBS MODERATE 35: CPT | Performed by: INTERNAL MEDICINE

## 2024-10-02 PROCEDURE — 36415 COLL VENOUS BLD VENIPUNCTURE: CPT

## 2024-10-02 PROCEDURE — 82962 GLUCOSE BLOOD TEST: CPT

## 2024-10-02 RX ORDER — DOXYCYCLINE 100 MG/1
100 CAPSULE ORAL EVERY 12 HOURS SCHEDULED
Status: DISCONTINUED | OUTPATIENT
Start: 2024-10-02 | End: 2024-10-04 | Stop reason: HOSPADM

## 2024-10-02 RX ADMIN — CEFEPIME 2000 MG: 2 INJECTION, POWDER, FOR SOLUTION INTRAVENOUS at 19:28

## 2024-10-02 RX ADMIN — METOPROLOL TARTRATE 25 MG: 25 TABLET, FILM COATED ORAL at 10:09

## 2024-10-02 RX ADMIN — BUDESONIDE 500 MCG: 0.5 INHALANT RESPIRATORY (INHALATION) at 19:17

## 2024-10-02 RX ADMIN — CEFEPIME 2000 MG: 2 INJECTION, POWDER, FOR SOLUTION INTRAVENOUS at 06:51

## 2024-10-02 RX ADMIN — BUDESONIDE 500 MCG: 0.5 INHALANT RESPIRATORY (INHALATION) at 04:37

## 2024-10-02 RX ADMIN — IPRATROPIUM BROMIDE AND ALBUTEROL SULFATE 1 DOSE: .5; 2.5 SOLUTION RESPIRATORY (INHALATION) at 14:56

## 2024-10-02 RX ADMIN — MINERAL SUPPLEMENT IRON 300 MG / 5 ML STRENGTH LIQUID 100 PER BOX UNFLAVORED 220 MG: at 10:09

## 2024-10-02 RX ADMIN — IPRATROPIUM BROMIDE AND ALBUTEROL SULFATE 1 DOSE: .5; 2.5 SOLUTION RESPIRATORY (INHALATION) at 04:37

## 2024-10-02 RX ADMIN — IPRATROPIUM BROMIDE AND ALBUTEROL SULFATE 1 DOSE: .5; 2.5 SOLUTION RESPIRATORY (INHALATION) at 19:17

## 2024-10-02 RX ADMIN — METOPROLOL TARTRATE 25 MG: 25 TABLET, FILM COATED ORAL at 21:35

## 2024-10-02 RX ADMIN — METRONIDAZOLE 500 MG: 500 INJECTION, SOLUTION INTRAVENOUS at 05:30

## 2024-10-02 RX ADMIN — SODIUM CHLORIDE, PRESERVATIVE FREE 40 MG: 5 INJECTION INTRAVENOUS at 17:25

## 2024-10-02 RX ADMIN — SODIUM CHLORIDE, PRESERVATIVE FREE 40 MG: 5 INJECTION INTRAVENOUS at 05:23

## 2024-10-02 RX ADMIN — VANCOMYCIN HYDROCHLORIDE 1250 MG: 10 INJECTION, POWDER, LYOPHILIZED, FOR SOLUTION INTRAVENOUS at 10:07

## 2024-10-02 RX ADMIN — MEROPENEM 1000 MG: 1 INJECTION INTRAVENOUS at 22:21

## 2024-10-02 RX ADMIN — METRONIDAZOLE 500 MG: 500 INJECTION, SOLUTION INTRAVENOUS at 13:50

## 2024-10-02 RX ADMIN — IPRATROPIUM BROMIDE AND ALBUTEROL SULFATE 1 DOSE: .5; 2.5 SOLUTION RESPIRATORY (INHALATION) at 09:32

## 2024-10-02 RX ADMIN — DOXYCYCLINE HYCLATE 100 MG: 100 CAPSULE ORAL at 21:34

## 2024-10-02 NOTE — PROGRESS NOTES
Physician Progress Note      PATIENT:               CARLOS YI  CSN #:                  [id number]  :                       1950  ADMIT DATE:       2024 2:58 PM  DISCH DATE:  RESPONDING  PROVIDER #:        Antoine Hodges DO          QUERY TEXT:    Pt admitted with Sepsis.  Pt noted to have chronic indwelling urinary   catheter.  If possible, please document in the progress notes and discharge   summary if you are evaluating and/or treating any of the following:    The medical record reflects the following:  Risk Factors: chronic shipman poa  Clinical Indicators: UA with large leukocytes, UC with mixed niall, per IM   \"...UTI and possible aspiration pneumonia...\", per IM \"...Acute renal failure   with chronic indwelling Shipman catheter...\"  Treatment: IV Vanc, Flagyl and Maxipime    Thank you,  Kristina Blanco RN, BSN, CDIS  Clinical Documentation Integrity  [email or web address]  Options provided:  -- UTI due to chronic indwelling urinary catheter  -- UTI not due to indwelling urinary catheter  -- UTI ruled out after study  -- Other - I will add my own diagnosis  -- Disagree - Not applicable / Not valid  -- Disagree - Clinically unable to determine / Unknown  -- Refer to Clinical Documentation Reviewer    PROVIDER RESPONSE TEXT:    UTI is due to the chronic indwelling urinary catheter.    Query created by: Kristina Blanco on 10/2/2024 11:05 AM      Electronically signed by:  Antoine Hodges DO 10/2/2024 1:51 PM

## 2024-10-02 NOTE — PROGRESS NOTES
Internal Medicine Progress Note     [facility]     Antoine Hodges D.O., LEIGHANN Freire D.O., LEIGHANN Jiang D.O.     Usha Gomez, MSN, APRN, NP-C  Erlin Contreras, MSN, APRN-CNP  Willis Rosales, MSN, APRN, NP-C  Cheryl Quiroz, MSN, APRN-CNP  Onelia Young, MSN, APRN, NP-C     Primary Care Physician: Awadalla, Bahaa A, MD   Admitting Physician:  Antoine Hodges DO  Admission date and time: 9/29/2024  2:58 PM    Room:  [address] [place] [address]  Admitting diagnosis: Shock [R57.9]  Respiratory distress [R06.03]  Coffee ground emesis [K92.0]  Atrial fibrillation with rapid ventricular response (HCC) [I48.91]  Acute respiratory failure with hypoxia and hypercapnia [J96.01, J96.02]  Gastrointestinal hemorrhage, unspecified gastrointestinal hemorrhage type [K92.2]    Patient Name: Cuate Stone  MRN: [id number]    Date of Service: 10/2/2024     Subjective:  Cuate is a 73 y.o. male who was seen and examined today,10/2/2024, at the bedside.  Patient is currently DNR CC.  PEG tube has been reinserted and the patient is currently resuming on tube feedings.  Patient resting company at the present time.  Plan for discharge to nursing home    Review of System:   Constitutional:   Generalized malaise and fatigue.  HEENT:   Denies ear pain, sore throat, sinus or eye problems.  Nasal cannula oxygen is in place.  Cardiovascular:   Denies any chest pain, irregular heartbeats, or palpitations.   Respiratory:   Acute on chronic shortness of breath with intermittent coughing.  No hemoptysis.  Gastrointestinal:   PEG tube is in place.  He has had no additional GI bleeding.  He has no current abdominal pain.  Genitourinary:    Denies any urgency, frequency, hematuria.  Voiding with the use of a catheter.  Extremities:   Denies lower extremity swelling, edema or cyanosis.   Neurology:    Chronic neurologic deficits related to previous CVA.  Psch:   Denies being anxious or

## 2024-10-02 NOTE — PROGRESS NOTES
10/02/24 0918   Encounter Summary   Encounter Overview/Reason Palliative Care   Service Provided For Patient   Referral/Consult From Palliative Care   Support System   (states no family has guardian)   Last Encounter  10/02/24  (P-DL)   Complexity of Encounter Moderate   Spiritual/Emotional needs   Type Spiritual Support   Palliative Care   Type Palliative Care, Initial/Spiritual Assessment   Assessment/Intervention/Outcome   Assessment Calm;Coping   Intervention Active listening;Discussed belief system/Mandaen practices/sanaz;Discussed illness injury and it’s impact;Explored/Affirmed feelings, thoughts, concerns;Prayer (assurance of)/[place];Nurtured Hope   Outcome Comfort;Engaged in conversation;Expressed feelings, needs, and concerns;Expressed Gratitude     Cuate was alert and engaged during  visit. I has a legal guardian and states no family. He is of the [employer] sanaz and appreciated the prayer support.  available for additional spiritual support as needed.  Danika Cervantes, [facility], BCC Contact at Ext: [phone]

## 2024-10-02 NOTE — PROGRESS NOTES
[facility]  [facility]  Progress Note    CC: aspiration pneumonia  Face to face encounter   SUBJECTIVE:  10/2/2024  Patient is in bed- has been afebrile. On 2 liters nasal canula.   On tube feeds.   Patient is tolerating medications. No reported adverse drug reactions.    Medications:  Scheduled Meds:   ferrous Sulfate  220 mg Oral Daily    metoprolol tartrate  25 mg PEG Tube BID    ipratropium 0.5 mg-albuterol 2.5 mg  1 Dose Inhalation Q4H WA RT    budesonide  500 mcg Nebulization BID RT    cefepime  2,000 mg IntraVENous Q12H    vancomycin (VANCOCIN) intermittent dosing (placeholder)   Other RX Placeholder    metroNIDAZOLE  500 mg IntraVENous Q8H    pantoprazole (PROTONIX) 40 mg in sodium chloride (PF) 0.9 % 10 mL injection  40 mg IntraVENous Q12H     Continuous Infusions:   dextrose       PRN Meds:sodium phosphate 12.84 mmol in sodium chloride 0.9 % 250 mL IVPB **OR** sodium phosphate 25.65 mmol in sodium chloride 0.9 % 250 mL IVPB, magnesium sulfate, potassium chloride **OR** potassium alternative oral replacement **OR** potassium chloride, HYDROcodone-acetaminophen, albuterol, glucose, dextrose bolus **OR** dextrose bolus, glucagon (rDNA), dextrose  OBJECTIVE:  Patient Vitals for the past 24 hrs:   BP Temp Temp src Pulse Resp SpO2   10/02/24 1800 (!) 127/57 98 °F (36.7 °C) Oral 65 16 97 %   10/02/24 1009 (!) 127/56 -- -- 75 -- --   10/02/24 0530 102/61 98.2 °F (36.8 °C) Oral 95 16 94 %   10/01/24 2230 111/60 -- -- 70 16 --     Constitutional: The patient is awake, alert. In bed. No distress    Skin: Warm and dry. + rash.   Head: Eyes show round, and reactive pupils. No jaundice.   Mouth: Moist mucous membranes, no ulcerations.  Neck: Supple to movements.   Chest: No use of accessory muscles to breathe. Symmetrical expansion. Auscultation reveals diminished to bases.  Coarse upper airways  Cardiovascular: S1 and S2 are rhythmic and regular.   Abdomen: Positive bowel sounds to  auscultation. Benign to palpation.  PEG  Extremities: No clubbing, no cyanosis, + edema.  Right sided- CVA   PIV  Devine     Laboratory and Tests Review:  Lab Results   Component Value Date    WBC 10.0 10/02/2024    WBC 18.3 (H) 10/01/2024    WBC 27.9 (H) 09/30/2024    HGB 10.8 (L) 10/02/2024    HCT 34.5 (L) 10/02/2024    MCV 96.8 10/02/2024     09/30/2024     Lab Results   Component Value Date    NEUTROABS 8.11 (H) 10/02/2024    NEUTROABS 15.74 (H) 10/01/2024    NEUTROABS 23.41 (H) 09/30/2024     Lab Results   Component Value Date    .0 (H) 09/30/2024     No results found for: \"SEDRATE\"  Lab Results   Component Value Date    ALT 9 10/02/2024    AST 25 10/02/2024    ALKPHOS 69 10/02/2024    BILITOT 0.8 10/02/2024     Lab Results   Component Value Date/Time     10/02/2024 05:39 AM    K 3.5 10/02/2024 05:39 AM    K 4.5 07/20/2022 06:53 PM     10/02/2024 05:39 AM    CO2 26 10/02/2024 05:39 AM    BUN 40 10/02/2024 05:39 AM    CREATININE 1.3 10/02/2024 05:39 AM    GFRAA >60 09/25/2022 03:27 PM    LABGLOM 58 10/02/2024 05:39 AM    GLUCOSE 74 10/02/2024 05:39 AM    CALCIUM 7.5 10/02/2024 05:39 AM    BILITOT 0.8 10/02/2024 05:39 AM    ALKPHOS 69 10/02/2024 05:39 AM    AST 25 10/02/2024 05:39 AM    ALT 9 10/02/2024 05:39 AM     Radiology:    Impression:        1. No sign of extravasation of injected intravenous contrast into the bowel  to correlate with a history of GI bleed and coffee ground emesis.  2. New small right pleural effusion.  3. Moderate consolidative patchy alveolar infiltrate in the infrahilar left  lower lobe. Moderate patchy alveolar infiltrates scattered throughout the  right lower lobe. Milder patchy alveolar infiltrate in the dependent portion  of the right upper lobe. This combination of it infiltrates is suggestive of  aspiration pneumonia, although other multifocal pneumonia could have this  appearance.  4. Free gastroesophageal reflux.  5. Increase in size of the left staghorn  calculi, with prominently increased  size of the calculus in the lower pole calices, now filling the lower pole  collecting system. Slight increase in size of the calculi in the upper pole  calices.  6. Slight increase in size of the nonobstructive calculi in the upper pole of  the right kidney, remaining mild, up to 7 mm in length.  7. Stable severe descending and sigmoid diverticulosis with no sign of  diverticulitis.  8. Devine catheter again seen in the urinary bladder which has its superior  section herniated into a moderate sized left inguinal hernia. The balloon is  now located at the base of the bladder, previously seen in the portion of the  bladder in the hernia sac.  9. No change in mild diffuse osseous sclerosis of uncertain etiology.     Microbiology:   9/29/2024- blood culture- staph simulans and staph epi  GTube- culture- staph aureus-MSSA, corynebacterium striatum, strep agalactiae     9/30/2024- respiratory culture- Staph Aureus-  ESBL+ E.Coli , salvador krusei    ASSESSMENT:  Leukocytosis   CONS bacteremia  Aspiration pneumonia   Staghorn calculi  GIB  History of CVA  Peg dislodgement    PLAN:  Discussed with Dr. Bolaños   Currently on Cefepime, Vancomycin and flagyl  Change to meropenem and doxycycline  Procal- 0.16  Cultures reviewed   Monitor labs- WBC- 10.0  Code status - DNR-CC - there is no effective PO antibiotics to treat the ESBL + E.coli in the sputum - so the options would be to treat with IV antibiotics if guardian wishes or do not treat at all.     MARIELY Manuel - CNS  7:32 PM  10/2/2024

## 2024-10-02 NOTE — PLAN OF CARE
Problem: Safety - Adult  Goal: Free from fall injury  Outcome: Progressing     Problem: Discharge Planning  Goal: Discharge to home or other facility with appropriate resources  Outcome: Progressing     Problem: Skin/Tissue Integrity  Goal: Absence of new skin breakdown  Description: 1.  Monitor for areas of redness and/or skin breakdown  2.  Assess vascular access sites hourly  3.  Every 4-6 hours minimum:  Change oxygen saturation probe site  4.  Every 4-6 hours:  If on nasal continuous positive airway pressure, respiratory therapy assess nares and determine need for appliance change or resting period.  Outcome: Progressing     Problem: Safety - Medical Restraint  Goal: Remains free of injury from restraints (Restraint for Interference with Medical Device)  Description: INTERVENTIONS:  1. Determine that other, less restrictive measures have been tried or would not be effective before applying the restraint  2. Evaluate the patient's condition at the time of restraint application  3. Inform patient/family regarding the reason for restraint  4. Q2H: Monitor safety, psychosocial status, comfort, nutrition and hydration  Outcome: Progressing     Problem: Pain  Goal: Verbalizes/displays adequate comfort level or baseline comfort level  Outcome: Progressing     Problem: Chronic Conditions and Co-morbidities  Goal: Patient's chronic conditions and co-morbidity symptoms are monitored and maintained or improved  Outcome: Progressing

## 2024-10-02 NOTE — PROGRESS NOTES
ADDENDUM: Vancomycin IV has been discontinued. Pharmacy will sign off on the consult. Please re-consult if needed.    Ayan GrajedaD, BCEMP 10/2/2024 8:27 PM       Pharmacy Consultation Note  (Antibiotic Dosing and Monitoring)    Initial consult date: 9/30/2024  Consulting physician/provider: Dr. Chavez  Drug: Vancomycin  Indication: UTI x 7 days    Age/  Gender Height Weight IBW  Allergy Information   73 y.o./male 180.3 cm (5' 11\") 77.1 kg (170 lb)     Ideal body weight: 75.3 kg (166 lb 0.1 oz)  Adjusted ideal body weight: 77.3 kg (170 lb 5.2 oz)   Elemental sulfur and Penicillins      Renal Function:  Recent Labs     09/30/24  1020 10/01/24  0537 10/02/24  0539   BUN 63* 51* 40*   CREATININE 1.8* 1.6* 1.3*       Intake/Output Summary (Last 24 hours) at 10/2/2024 0734  Last data filed at 10/2/2024 0528  Gross per 24 hour   Intake 0 ml   Output 1850 ml   Net -1850 ml       Vancomycin Monitoring:  Trough:  No results for input(s): \"VANCOTROUGH\" in the last 72 hours.  Random:    Recent Labs     10/01/24  0537 10/02/24  0539   VANCORANDOM 14.8 14.4     Recent vancomycin administrations                     vancomycin (VANCOCIN) 1,250 mg in sodium chloride 0.9 % 250 mL IVPB (mg) 1,250 mg New Bag 10/01/24 0852    vancomycin (VANCOCIN) 2000 mg in 0.9% sodium chloride 500 mL IVPB (mg) 2,000 mg New Bag 09/30/24 0629                     Assessment:  Patient is a 73 y.o. male who has been initiated on vancomycin  Estimated Creatinine Clearance: 55 mL/min (A) (based on SCr of 1.3 mg/dL (H)).  To dose vancomycin, pharmacy will be utilizing dosing based off of levels because of patient's renal impairment/insufficiency  10/1: Random level = 14.8 mcg/mL  10/2: Random level = 14.4 mcg/mL    Plan:  Re-dose vancomycin 1250 mg IV x 1 dose  Random level tomorrow with AM labs  Will continue to monitor renal function   Pharmacy to follow      Cheryl Gandara, PharmD, 10/2/38005:34 AM      [facility]: [phone]

## 2024-10-02 NOTE — CARE COORDINATION
CM note; Pt is from [facility] and he can return with a pending pre-cert.  CM did inform liaison that patient is approaching discharge and that she should submit for that today.  Pts PEG tube has been reinserted at the bedside by GS and tube feedings have been restarted.  Awaiting final antibiotics plan, infectious disease was consulted yesterday. Pt is currently on IV Vanc, cefepime and flagyl.

## 2024-10-02 NOTE — PROGRESS NOTES
Physical Therapy      Physical Therapy    Room #:   0338/0338-02    Date: 10/2/2024       Patient Name: Cuate Stone  : 1950      MRN: [id number]     Patient unavailable for physical therapy treatment due to  patient declined therapy for today and currently on phone . Will attempt PT treatment tomorrow. Thank you.      Shelia Pimentel, PTA    #[phone]

## 2024-10-02 NOTE — PROGRESS NOTES
Assessment and Plan  Patient is a 73 y.o. male with the following medical Problems:   Acute hypoxic respiratory failure requiring intubation mechanical ventilation  Upper GI bleeding  Severe sepsis with septic shock requiring Levophed  Urinary tract infection  Aspiration pneumonia  Metabolic encephalopathy  Acute kidney injury  Heart failure with preserved ejection fraction  History of nicotine    Plan of care:  Broad-spectrum antibiotic with cefepime, Flagyl AND vancomycin  Protonix twice a day  Goals of care discussion with the POA.  Patient CODE STATUS changed to DNR CC.  GI consult  SCDs for DVT prophylaxis  Serial H&H.  Palliative care      History of Present Illness:   History is limited due to patient's medical condition.  History is mostly obtained from the chart  Patient is a 73-year-old man with  peptic ulcer disease, who presents with shortness of breath and coffee-ground emesis.  Patient was seen earlier this morning at 0442 for complaints of coffee-ground emesis and abdominal pain.  He has per chart review a past history of stroke, right-sided hemiparesis, dysarthria, and dysphagia.  Patient is coming from a nursing facility and DNRCC paperwork was with him earlier this morning.  Patient returns today with similar complaints on top of shortness of breath.  Patient was hypotensive, tachycardic to the 130s.  Patient return to the emergency department given the emergent situation of hypotensive and tachycardia, the patient was asked multiple times about his DNRCC status, the patient was alert and oriented and capable of making his own decisions, patient was asked multiple times about chest compressions and putting a tube down his throat to help him breathe, patient states he wants all interventions to save his life.     Daily progress:  September 30, 2024: Patient continues to be sedated, intubated, mechanically ventilated.  Kidney function is worsening.  Patient had 420 cc of urine output over the last 24  2000 mg in 50 mL IVPB premix, 2,000 mg, IntraVENous, PRN, Jericho Freire, DO    potassium chloride (KLOR-CON M) extended release tablet 40 mEq, 40 mEq, Oral, PRN **OR** potassium bicarb-citric acid (EFFER-K) effervescent tablet 40 mEq, 40 mEq, Oral, PRN **OR** potassium chloride 10 mEq/100 mL IVPB (Peripheral Line), 10 mEq, IntraVENous, PRN, Jericho Freire, DO    ferrous Sulfate 300 (60 Fe) MG/5ML solution 220 mg, 220 mg, Oral, Daily, Jericho Freire, DO, 220 mg at 10/02/24 1009    HYDROcodone-acetaminophen (NORCO) 5-325 MG per tablet 1 tablet, 1 tablet, Oral, Q6H PRN, Jericho Freire,     metoprolol tartrate (LOPRESSOR) tablet 25 mg, 25 mg, PEG Tube, BID, Jericho Freire, , 25 mg at 10/02/24 1009    albuterol (PROVENTIL) (2.5 MG/3ML) 0.083% nebulizer solution 2.5 mg, 2.5 mg, Nebulization, Q6H PRN, Jericho Freire,     ipratropium 0.5 mg-albuterol 2.5 mg (DUONEB) nebulizer solution 1 Dose, 1 Dose, Inhalation, Q4H WA RT, Jericho Freire DO, 1 Dose at 10/02/24 0932    budesonide (PULMICORT) nebulizer suspension 500 mcg, 500 mcg, Nebulization, BID RT, Jericho Freire DO, 500 mcg at 10/02/24 0437    ceFEPIme (MAXIPIME) 2,000 mg in sodium chloride 0.9 % 100 mL IVPB (mini-bag), 2,000 mg, IntraVENous, Q12H, Todd Quiñonez, MARIELY - CNP, Stopped at 10/02/24 0903    vancomycin (VANCOCIN) intermittent dosing (placeholder), , Other, RX Placeholder, Kathy, Islashell U, DO    metroNIDAZOLE (FLAGYL) 500 mg in 0.9% NaCl 100 mL IVPB premix, 500 mg, IntraVENous, Q8H, Day Croft MD, Last Rate: 100 mL/hr at 10/02/24 1350, 500 mg at 10/02/24 1350    glucose chewable tablet 16 g, 4 tablet, Oral, PRN, Day Croft MD    dextrose bolus 10% 125 mL, 125 mL, IntraVENous, PRN **OR** dextrose bolus 10% 250 mL, 250 mL, IntraVENous, PRN, Day Croft MD    glucagon injection 1 mg, 1 mg, SubCUTAneous, PRN, Day Croft MD    dextrose 10 % infusion, , IntraVENous, Continuous PRN, Guerda,  no tenderness, no hepatosplenomegaly, and no masses  Extremities: No edema. Pulses are equally present.   Skin: intact, no rashes   Neurologic: Awake and follows command, +ve right-sided hemiplegia     Investigations:  Labs, radiological imaging and cardiac work up were personally reviewed and independently interpreted.        STAFF PHYSICIAN NOTE OF PERSONAL INVOLVEMENT IN CARE  As the attending physician, I certify that I personally reviewed the patient's history and personally examined the patient to confirm the physical findings described above, and that I reviewed the relevant imaging studies and available reports.  I also discussed the differential diagnosis and all of the proposed management plans with the patient and individuals accompanying the patient to this visit.  They had the opportunity to ask questions about the proposed management plans and to have those questions answered.      Electronically signed by Day Croft MD on 10/2/2024 at 2:33 PM

## 2024-10-02 NOTE — PROGRESS NOTES
Surgery Progress Note            Chief complaint:   Chief Complaint   Patient presents with    Shortness of Breath    GI Problem     Coffee ground emesis      Patient Active Problem List   Diagnosis    Inguinal hernia    Acute renal failure (HCC)    Acute cholecystitis    Acute blood loss anemia    Acquired hypothyroidism    Gross hematuria    Hypertension    History of stroke    Respiratory failure    Severe protein-calorie malnutrition (HCC)    Acute respiratory failure with hypoxia and hypercapnia    Palliative care encounter       S: peg out    O:   Vitals:    10/02/24 0530   BP: 102/61   Pulse: 95   Resp: 16   Temp: 98.2 °F (36.8 °C)   SpO2: 94%       Intake/Output Summary (Last 24 hours) at 10/2/2024 0723  Last data filed at 10/2/2024 0528  Gross per 24 hour   Intake 0 ml   Output 1850 ml   Net -1850 ml           Labs:  Lab Results   Component Value Date/Time    WBC 10.0 10/02/2024 05:35 AM    WBC 18.3 10/01/2024 05:37 AM    WBC 27.9 09/30/2024 03:02 AM    HGB 10.5 10/02/2024 05:35 AM    HGB 11.1 10/01/2024 10:25 PM    HGB 10.9 10/01/2024 05:37 AM    HGB 10.8 10/01/2024 05:37 AM    HCT 33.7 10/02/2024 05:35 AM    HCT 35.4 10/01/2024 10:25 PM    HCT 33.8 10/01/2024 05:37 AM    HCT 33.7 10/01/2024 05:37 AM     Lab Results   Component Value Date    CREATININE 1.3 (H) 10/02/2024    BUN 40 (H) 10/02/2024     (H) 10/02/2024    K 3.5 10/02/2024     (H) 10/02/2024    CO2 26 10/02/2024     Lab Results   Component Value Date/Time    LIPASE 80 09/29/2024 03:15 PM    LIPASE 32 09/03/2017 10:05 AM    LIPASE 21 09/06/2015 06:15 PM         Physical exam:   /61   Pulse 95   Temp 98.2 °F (36.8 °C) (Oral)   Resp 16   Ht 1.803 m (5' 11\")   Wt 80.2 kg (176 lb 12.8 oz)   SpO2 94%   BMI 24.66 kg/m²   General appearance: NAD  Head: NCAT  Neck: supple, no masses  Lungs: equal chest rise bilateral  Heart: S1S2 present  Abdomen: soft, nontender, nondistended,  peg site patent  Skin; no lesions  Gu: no cva

## 2024-10-02 NOTE — DISCHARGE INSTR - COC
Continuity of Care Form    Patient Name: Cuate Stone   :  1950  MRN:  [id number]    Admit date:  2024  Discharge date:  10/3/2024    Code Status Order: DNR-CC   Advance Directives:   Advance Care Flowsheet Documentation             Admitting Physician:  Antoine Hodges DO  PCP: Awadalla, Bahaa A, MD    Discharging Nurse: ***  Discharging Hospital Unit/Room#: 0338/0338-02  Discharging Unit Phone Number: [phone]    Emergency Contact:   Extended Emergency Contact Information  Primary Emergency Contact: Indra Tan  Work Phone: [phone]  Relation: Legal Guardian  Secondary Emergency Contact: Alejandro Partida  Address: [address]  Home Phone: [phone]  Mobile Phone: [phone]  Relation: Other   needed? No    Past Surgical History:  Past Surgical History:   Procedure Laterality Date    CHOLECYSTECTOMY  2015    HERNIA REPAIR      Dr Perkins        Immunization History:   Immunization History   Administered Date(s) Administered    Influenza, AFLURIA (age 3 y+), FLUZONE, (age 6 mo+), Quadv MDV, 0.5mL 2016       Active Problems:  Patient Active Problem List   Diagnosis Code    Inguinal hernia K40.90    Acute renal failure (HCC) N17.9    Acute cholecystitis K81.0    Acute blood loss anemia D62    Acquired hypothyroidism E03.9    Gross hematuria R31.0    Hypertension I10    History of stroke Z86.73    Respiratory failure J96.90    Severe protein-calorie malnutrition (HCC) E43    Acute respiratory failure with hypoxia and hypercapnia J96.01, J96.02    Palliative care encounter Z51.5       Isolation/Infection:   Isolation            No Isolation          Patient Infection Status       Infection Onset Added Last Indicated Last Indicated By Review Planned Expiration Resolved Resolved By    ESBL (Extended Spectrum Beta Lactamase) 09/30/24 10/02/24 09/30/24 Culture, Respiratory        MRSA 22 Culture,  hypotension):  Administer epinephrine subcutaneous 0.3mg (1:1000) x 1 dose.   May repeat twice every 5 minutes if needed.  Call EMS and notify office or physician immediately.  For all above reactions: administer Solu-Cortef 100mg slow IV push x 1.    VASCULAR ACCESS DRESSING CHANGE PROTOCOL  Cleanse insertion site with Shur-Clens® or equivalent three times.  Secure catheter with Steri-Strips®, Bone®, or equivalent securing device.  Apply Opsite® 3000 or equivalent transparent dressing.  Change dressing twice weekly, maintaining sterile technique. If there is a BioPatch®, SilverSite® or equivalent, change once weekly only or as needed.    FOLLOW-UP VISITS  Nursing staff should call the office during business hours to schedule a follow-up appointment once the patient is admitted to the service or facility. Every effort should be made to have patient follow-up within 2 weeks of discharge.  Continue IV antibiotic therapy until patient is seen in the office or unless specific stop date is noted on the original order or unless otherwise ordered by physician.  Call office to ensure stop date is correct before stopping antibiotics.      Nadine Bolaños MD

## 2024-10-02 NOTE — PROGRESS NOTES
Patient's peg tube completely dislodged from the abdomen. Dr. Chavez and Dr. Ta made aware. No new orders at this time.

## 2024-10-02 NOTE — PLAN OF CARE
Problem: Safety - Adult  Goal: Free from fall injury  10/2/2024 1156 by Yomaira Haji RN  Outcome: Progressing     Problem: Discharge Planning  Goal: Discharge to home or other facility with appropriate resources  10/2/2024 1156 by Yomaira Haji RN  Outcome: Progressing     Problem: Skin/Tissue Integrity  Goal: Absence of new skin breakdown  10/2/2024 1156 by Yomaira Haji RN  Outcome: Progressing     Problem: Safety - Medical Restraint  Goal: Remains free of injury from restraints (Restraint for Interference with Medical Device)  10/2/2024 1156 by Yomaira Haji RN  Outcome: Progressing     Problem: Pain  Goal: Verbalizes/displays adequate comfort level or baseline comfort level  10/2/2024 1156 by Yomaira Haji RN  Outcome: Progressing     Problem: Chronic Conditions and Co-morbidities  Goal: Patient's chronic conditions and co-morbidity symptoms are monitored and maintained or improved  10/2/2024 1156 by Yomaira Haji RN  Outcome: Progressing

## 2024-10-02 NOTE — PROGRESS NOTES
Speech Language Pathology  NAME:  Cuate Stone  :  1950  DATE: 10/2/2024  ROOM:  0338/0338-02     SLP eval and treat  Start:  10/02/24 0815,   End:  10/02/24 0815,   ONE TIME,   Standing Count:  1 Occurrences,   R       Willis Rosales APRN - [facility]  swallow eval      REASON:  Other: per nursing home documentation patient has been NPO with peg only since at least 2022.  Given the length of time he has been NPO and the unclear reason for NPO with PEG tube MBSS would be needed to assess pharyngeal phase of the swallow integrity.  Would also need to determine if there was an esophageal reason for the NPO with PEG tube prior to MBSS         Thank You    Jennifer Hart MSCCC/SLP  Speech Language Pathologist  [phone]

## 2024-10-03 VITALS
DIASTOLIC BLOOD PRESSURE: 70 MMHG | SYSTOLIC BLOOD PRESSURE: 145 MMHG | OXYGEN SATURATION: 99 % | WEIGHT: 176.8 LBS | HEART RATE: 73 BPM | BODY MASS INDEX: 24.75 KG/M2 | HEIGHT: 71 IN | RESPIRATION RATE: 20 BRPM | TEMPERATURE: 97.3 F

## 2024-10-03 LAB
ALBUMIN SERPL-MCNC: 2.6 G/DL (ref 3.5–5.2)
ALP SERPL-CCNC: 63 U/L (ref 40–129)
ALT SERPL-CCNC: 7 U/L (ref 0–40)
ANION GAP SERPL CALCULATED.3IONS-SCNC: 5 MMOL/L (ref 7–16)
AST SERPL-CCNC: 22 U/L (ref 0–39)
BASOPHILS # BLD: 0.07 K/UL (ref 0–0.2)
BASOPHILS NFR BLD: 1 % (ref 0–2)
BILIRUB SERPL-MCNC: 0.6 MG/DL (ref 0–1.2)
BUN SERPL-MCNC: 36 MG/DL (ref 6–23)
CALCIUM SERPL-MCNC: 8 MG/DL (ref 8.6–10.2)
CHLORIDE SERPL-SCNC: 119 MMOL/L (ref 98–107)
CO2 SERPL-SCNC: 27 MMOL/L (ref 22–29)
CREAT SERPL-MCNC: 1.2 MG/DL (ref 0.7–1.2)
EOSINOPHIL # BLD: 0.2 K/UL (ref 0.05–0.5)
EOSINOPHILS RELATIVE PERCENT: 3 % (ref 0–6)
ERYTHROCYTE [DISTWIDTH] IN BLOOD BY AUTOMATED COUNT: 15.4 % (ref 11.5–15)
GFR, ESTIMATED: 64 ML/MIN/1.73M2
GLUCOSE BLD-MCNC: 102 MG/DL (ref 74–99)
GLUCOSE BLD-MCNC: 110 MG/DL (ref 74–99)
GLUCOSE BLD-MCNC: 124 MG/DL (ref 74–99)
GLUCOSE BLD-MCNC: 125 MG/DL (ref 74–99)
GLUCOSE BLD-MCNC: 99 MG/DL (ref 74–99)
GLUCOSE SERPL-MCNC: 116 MG/DL (ref 74–99)
HCT VFR BLD AUTO: 34 % (ref 37–54)
HGB BLD-MCNC: 10.6 G/DL (ref 12.5–16.5)
LYMPHOCYTES NFR BLD: 0.4 K/UL (ref 1.5–4)
LYMPHOCYTES RELATIVE PERCENT: 5 % (ref 20–42)
MAGNESIUM SERPL-MCNC: 2.3 MG/DL (ref 1.6–2.6)
MCH RBC QN AUTO: 30 PG (ref 26–35)
MCHC RBC AUTO-ENTMCNC: 31.2 G/DL (ref 32–34.5)
MCV RBC AUTO: 96.3 FL (ref 80–99.9)
MICROORGANISM SPEC CULT: ABNORMAL
MICROORGANISM/AGENT SPEC: ABNORMAL
MONOCYTES NFR BLD: 0.2 K/UL (ref 0.1–0.95)
MONOCYTES NFR BLD: 3 % (ref 2–12)
NEUTROPHILS NFR BLD: 89 % (ref 43–80)
NEUTS SEG NFR BLD: 6.73 K/UL (ref 1.8–7.3)
PHOSPHATE SERPL-MCNC: 1 MG/DL (ref 2.5–4.5)
PLATELET # BLD AUTO: 134 K/UL (ref 130–450)
PMV BLD AUTO: 11.6 FL (ref 7–12)
POTASSIUM SERPL-SCNC: 3.8 MMOL/L (ref 3.5–5)
PROT SERPL-MCNC: 5.3 G/DL (ref 6.4–8.3)
RBC # BLD AUTO: 3.53 M/UL (ref 3.8–5.8)
RBC # BLD: NORMAL 10*6/UL
SODIUM SERPL-SCNC: 151 MMOL/L (ref 132–146)
SPECIMEN DESCRIPTION: ABNORMAL
SPECIMEN DESCRIPTION: ABNORMAL
WBC OTHER # BLD: 7.6 K/UL (ref 4.5–11.5)

## 2024-10-03 PROCEDURE — 6370000000 HC RX 637 (ALT 250 FOR IP): Performed by: INTERNAL MEDICINE

## 2024-10-03 PROCEDURE — 36415 COLL VENOUS BLD VENIPUNCTURE: CPT

## 2024-10-03 PROCEDURE — 82962 GLUCOSE BLOOD TEST: CPT

## 2024-10-03 PROCEDURE — 85025 COMPLETE CBC W/AUTO DIFF WBC: CPT

## 2024-10-03 PROCEDURE — 6360000002 HC RX W HCPCS: Performed by: INTERNAL MEDICINE

## 2024-10-03 PROCEDURE — 6360000002 HC RX W HCPCS: Performed by: CLINICAL NURSE SPECIALIST

## 2024-10-03 PROCEDURE — 80053 COMPREHEN METABOLIC PANEL: CPT

## 2024-10-03 PROCEDURE — 97530 THERAPEUTIC ACTIVITIES: CPT

## 2024-10-03 PROCEDURE — 2700000000 HC OXYGEN THERAPY PER DAY

## 2024-10-03 PROCEDURE — 6360000002 HC RX W HCPCS: Performed by: STUDENT IN AN ORGANIZED HEALTH CARE EDUCATION/TRAINING PROGRAM

## 2024-10-03 PROCEDURE — 83735 ASSAY OF MAGNESIUM: CPT

## 2024-10-03 PROCEDURE — 97110 THERAPEUTIC EXERCISES: CPT

## 2024-10-03 PROCEDURE — 84100 ASSAY OF PHOSPHORUS: CPT

## 2024-10-03 PROCEDURE — 6370000000 HC RX 637 (ALT 250 FOR IP): Performed by: CLINICAL NURSE SPECIALIST

## 2024-10-03 PROCEDURE — C1751 CATH, INF, PER/CENT/MIDLINE: HCPCS

## 2024-10-03 PROCEDURE — 94640 AIRWAY INHALATION TREATMENT: CPT

## 2024-10-03 PROCEDURE — 2580000003 HC RX 258: Performed by: CLINICAL NURSE SPECIALIST

## 2024-10-03 PROCEDURE — 36410 VNPNXR 3YR/> PHY/QHP DX/THER: CPT

## 2024-10-03 PROCEDURE — 2500000003 HC RX 250 WO HCPCS: Performed by: INTERNAL MEDICINE

## 2024-10-03 PROCEDURE — 2580000003 HC RX 258: Performed by: INTERNAL MEDICINE

## 2024-10-03 PROCEDURE — 2580000003 HC RX 258: Performed by: STUDENT IN AN ORGANIZED HEALTH CARE EDUCATION/TRAINING PROGRAM

## 2024-10-03 PROCEDURE — 76937 US GUIDE VASCULAR ACCESS: CPT

## 2024-10-03 PROCEDURE — 99232 SBSQ HOSP IP/OBS MODERATE 35: CPT | Performed by: INTERNAL MEDICINE

## 2024-10-03 PROCEDURE — 2500000003 HC RX 250 WO HCPCS: Performed by: CLINICAL NURSE SPECIALIST

## 2024-10-03 RX ORDER — FERROUS SULFATE 300 MG/5ML
220 LIQUID (ML) ORAL DAILY
DISCHARGE
Start: 2024-10-04

## 2024-10-03 RX ORDER — LIDOCAINE HYDROCHLORIDE 10 MG/ML
50 INJECTION, SOLUTION INFILTRATION; PERINEURAL ONCE
Status: COMPLETED | OUTPATIENT
Start: 2024-10-03 | End: 2024-10-03

## 2024-10-03 RX ORDER — SODIUM CHLORIDE 9 MG/ML
INJECTION, SOLUTION INTRAVENOUS PRN
Status: DISCONTINUED | OUTPATIENT
Start: 2024-10-03 | End: 2024-10-04 | Stop reason: HOSPADM

## 2024-10-03 RX ORDER — DOXYCYCLINE 100 MG/1
100 CAPSULE ORAL EVERY 12 HOURS SCHEDULED
Qty: 12 CAPSULE | Refills: 0 | Status: SHIPPED | OUTPATIENT
Start: 2024-10-03 | End: 2024-10-09

## 2024-10-03 RX ORDER — SODIUM CHLORIDE 0.9 % (FLUSH) 0.9 %
5-40 SYRINGE (ML) INJECTION EVERY 12 HOURS SCHEDULED
Status: DISCONTINUED | OUTPATIENT
Start: 2024-10-03 | End: 2024-10-04 | Stop reason: HOSPADM

## 2024-10-03 RX ORDER — PROMETHAZINE HYDROCHLORIDE 12.5 MG/1
12.5 SUPPOSITORY RECTAL EVERY 6 HOURS PRN
DISCHARGE
Start: 2024-10-03

## 2024-10-03 RX ORDER — IPRATROPIUM BROMIDE AND ALBUTEROL SULFATE 2.5; .5 MG/3ML; MG/3ML
1 SOLUTION RESPIRATORY (INHALATION) 4 TIMES DAILY
DISCHARGE
Start: 2024-10-03

## 2024-10-03 RX ORDER — DOCUSATE SODIUM 100 MG/1
100 CAPSULE, LIQUID FILLED ORAL 2 TIMES DAILY
DISCHARGE
Start: 2024-10-03

## 2024-10-03 RX ORDER — METOPROLOL TARTRATE 25 MG/1
25 TABLET, FILM COATED ORAL 2 TIMES DAILY
DISCHARGE
Start: 2024-10-03

## 2024-10-03 RX ORDER — ATORVASTATIN CALCIUM 40 MG/1
40 TABLET, FILM COATED ORAL DAILY
DISCHARGE
Start: 2024-10-03

## 2024-10-03 RX ORDER — M-VIT,TX,IRON,MINS/CALC/FOLIC 27MG-0.4MG
1 TABLET ORAL DAILY
DISCHARGE
Start: 2024-10-03

## 2024-10-03 RX ORDER — BISACODYL 10 MG
10 SUPPOSITORY, RECTAL RECTAL DAILY PRN
DISCHARGE
Start: 2024-10-03

## 2024-10-03 RX ORDER — SODIUM CHLORIDE 0.9 % (FLUSH) 0.9 %
5-40 SYRINGE (ML) INJECTION PRN
Status: DISCONTINUED | OUTPATIENT
Start: 2024-10-03 | End: 2024-10-04 | Stop reason: HOSPADM

## 2024-10-03 RX ADMIN — SODIUM CHLORIDE 1000 MG: 9 INJECTION INTRAMUSCULAR; INTRAVENOUS; SUBCUTANEOUS at 13:13

## 2024-10-03 RX ADMIN — METOPROLOL TARTRATE 25 MG: 25 TABLET, FILM COATED ORAL at 20:19

## 2024-10-03 RX ADMIN — METOPROLOL TARTRATE 25 MG: 25 TABLET, FILM COATED ORAL at 08:05

## 2024-10-03 RX ADMIN — MEROPENEM 1000 MG: 1 INJECTION INTRAVENOUS at 05:20

## 2024-10-03 RX ADMIN — SODIUM CHLORIDE, PRESERVATIVE FREE 40 MG: 5 INJECTION INTRAVENOUS at 18:33

## 2024-10-03 RX ADMIN — IPRATROPIUM BROMIDE AND ALBUTEROL SULFATE 1 DOSE: .5; 2.5 SOLUTION RESPIRATORY (INHALATION) at 13:31

## 2024-10-03 RX ADMIN — BUDESONIDE 500 MCG: 0.5 INHALANT RESPIRATORY (INHALATION) at 17:14

## 2024-10-03 RX ADMIN — IPRATROPIUM BROMIDE AND ALBUTEROL SULFATE 1 DOSE: .5; 2.5 SOLUTION RESPIRATORY (INHALATION) at 17:14

## 2024-10-03 RX ADMIN — LIDOCAINE HYDROCHLORIDE 50 MG: 10 INJECTION, SOLUTION INFILTRATION; PERINEURAL at 14:10

## 2024-10-03 RX ADMIN — SODIUM PHOSPHATE, MONOBASIC, MONOHYDRATE AND SODIUM PHOSPHATE, DIBASIC, ANHYDROUS 25.65 MMOL: 276; 142 INJECTION, SOLUTION INTRAVENOUS at 13:20

## 2024-10-03 RX ADMIN — MINERAL SUPPLEMENT IRON 300 MG / 5 ML STRENGTH LIQUID 100 PER BOX UNFLAVORED 220 MG: at 08:04

## 2024-10-03 RX ADMIN — DOXYCYCLINE HYCLATE 100 MG: 100 CAPSULE ORAL at 20:19

## 2024-10-03 RX ADMIN — DOXYCYCLINE HYCLATE 100 MG: 100 CAPSULE ORAL at 08:04

## 2024-10-03 RX ADMIN — SODIUM CHLORIDE, PRESERVATIVE FREE 40 MG: 5 INJECTION INTRAVENOUS at 06:45

## 2024-10-03 RX ADMIN — IPRATROPIUM BROMIDE AND ALBUTEROL SULFATE 1 DOSE: .5; 2.5 SOLUTION RESPIRATORY (INHALATION) at 06:41

## 2024-10-03 RX ADMIN — IPRATROPIUM BROMIDE AND ALBUTEROL SULFATE 1 DOSE: .5; 2.5 SOLUTION RESPIRATORY (INHALATION) at 09:34

## 2024-10-03 RX ADMIN — BUDESONIDE 500 MCG: 0.5 INHALANT RESPIRATORY (INHALATION) at 06:41

## 2024-10-03 NOTE — PROGRESS NOTES
SL power midline Placement 10/3/2024        Product number: [facility] [id number]   Lot Number: [id number]   Consult: home abx   Ultrasound: yes   Right Cephalic vein:                Upper Arm Circumference: (CM) 28    Size:(FR)/GUAGE 4.5    Exposed Length: (CM) 0    Internal Length: (CM) 15   Cut: (CM) 15   Vein Measurement: 0.5cm              Lidocaine Given: yes    Tolerated well  Brisk blood return  Flushed well and capped  RN notified      Enrique Morgan RN  10/3/2024  2:11 PM

## 2024-10-03 NOTE — PROGRESS NOTES
Assessment and Plan  Patient is a 73 y.o. male with the following medical Problems:   Acute hypoxic respiratory failure requiring intubation mechanical ventilation  Upper GI bleeding  Severe sepsis with septic shock requiring Levophed  Urinary tract infection  Aspiration pneumonia  Metabolic encephalopathy  Acute kidney injury  Heart failure with preserved ejection fraction  History of nicotine    Plan of care:  Broad-spectrum antibiotic with Invanz and doxycycline.  ID team is following.  Protonix twice a day  Goals of care discussion with the POA.  Patient CODE STATUS changed to DNR CC.  GI consult  SCDs for DVT prophylaxis  Serial H&H.  Palliative care      History of Present Illness:   History is limited due to patient's medical condition.  History is mostly obtained from the chart  Patient is a 73-year-old man with  peptic ulcer disease, who presents with shortness of breath and coffee-ground emesis.  Patient was seen earlier this morning at 0442 for complaints of coffee-ground emesis and abdominal pain.  He has per chart review a past history of stroke, right-sided hemiparesis, dysarthria, and dysphagia.  Patient is coming from a nursing facility and DNRCC paperwork was with him earlier this morning.  Patient returns today with similar complaints on top of shortness of breath.  Patient was hypotensive, tachycardic to the 130s.  Patient return to the emergency department given the emergent situation of hypotensive and tachycardia, the patient was asked multiple times about his DNRCC status, the patient was alert and oriented and capable of making his own decisions, patient was asked multiple times about chest compressions and putting a tube down his throat to help him breathe, patient states he wants all interventions to save his life.     Daily progress:  September 30, 2024: Patient continues to be sedated, intubated, mechanically ventilated.  Kidney function is worsening.  Patient had 420 cc of urine output  2.5 mg, 2.5 mg, Nebulization, Q6H PRN, Jericho Freire DO    ipratropium 0.5 mg-albuterol 2.5 mg (DUONEB) nebulizer solution 1 Dose, 1 Dose, Inhalation, Q4H WA RT, Jericho Freire DO, 1 Dose at 10/03/24 1331    budesonide (PULMICORT) nebulizer suspension 500 mcg, 500 mcg, Nebulization, BID RT, Jericho Freire DO, 500 mcg at 10/03/24 0641    glucose chewable tablet 16 g, 4 tablet, Oral, PRN, Day Croft MD    dextrose bolus 10% 125 mL, 125 mL, IntraVENous, PRN **OR** dextrose bolus 10% 250 mL, 250 mL, IntraVENous, PRN, Day Croft MD    glucagon injection 1 mg, 1 mg, SubCUTAneous, PRN, Dya Croft MD    dextrose 10 % infusion, , IntraVENous, Continuous PRN, Day Croft MD    pantoprazole (PROTONIX) 40 mg in sodium chloride (PF) 0.9 % 10 mL injection, 40 mg, IntraVENous, Q12H, Vish Ambriz MD, 40 mg at 10/03/24 0645    Review of Systems:   General: denies weight gain, denies loss of appetite, fever, chills, night sweats.  HEENT: denies headaches, dizziness, head trauma, visual changes, eye pain, tinnitus, nosebleeds, hoarseness or throat pain    Respiratory: denies chest pain, dyspnea, cough and hemoptysis  Cardiovascular: denies orthopnea, paroxysmal nocturnal dyspnea, leg swelling, and previous heart attack.    Gastrointestinal: denies pain, nausea vomiting, diarrhea, constipation, melena or bleeding.  Genitourinary: denies hematuria, frequency, urgency or dysuria  Neurology: denies syncope, seizures, paralysis, paraesthesia   Endocrine: denies polyuria, polydipsia, skin or hair changes, and heat or cold intolerance  Musculoskeletal: denies joint pain, swelling, arthritis or myalgia  Hematologic: denies bleeding, adenopathy and easy bruising  Skin: denies rashes and skin discoloration  Psychiatry: denies depression    Physical Exam:   Vital Signs:  BP (!) 172/84   Pulse 73   Temp 97.8 °F (36.6 °C) (Oral)   Resp 16   Ht 1.803 m (5' 11\")   Wt 80.2  kg (176 lb 12.8 oz)   SpO2 96%   BMI 24.66 kg/m²     Input/Output:  In: 3277.8 [I.V.:2167.9; NG/GT:30]  Out: 725     Oxygen requirements: NC    Ventilator Information:  Vent Information  Ventilator ID: [id number]  Ventilator Initiate: Yes  Ventilator Discontinue: Yes  Vent Mode: AC/VC    General appearance: , not in pain or distress, in no respiratory distress    HEENT: Slurred speech , dry mucous membrane  Neck: Supple, no jugular venous distension, lymphadenopathy, thyromegaly or carotid bruits  Chest: Decreased breath sounds, no wheezing, no crackles and no tenderness over ribs   Cardiovascular: Normal S1 , S2, regular rate and rhythm, no murmur, rub or gallop  Abdomen: Normal sounds present, soft, lax with no tenderness, no hepatosplenomegaly, and no masses  Extremities: No edema. Pulses are equally present.   Skin: intact, no rashes   Neurologic: Awake and follows command, +ve right-sided hemiplegia     Investigations:  Labs, radiological imaging and cardiac work up were personally reviewed and independently interpreted.        STAFF PHYSICIAN NOTE OF PERSONAL INVOLVEMENT IN CARE  As the attending physician, I certify that I personally reviewed the patient's history and personally examined the patient to confirm the physical findings described above, and that I reviewed the relevant imaging studies and available reports.  I also discussed the differential diagnosis and all of the proposed management plans with the patient and individuals accompanying the patient to this visit.  They had the opportunity to ask questions about the proposed management plans and to have those questions answered.      Electronically signed by Day Croft MD on 10/3/2024 at 1:35 PM

## 2024-10-03 NOTE — DISCHARGE INSTRUCTIONS
Your information:  Name: Cuate Stone  : 1950    Your instructions:    Discharge to snf.  Continue tube feedings.    What to do after you leave the hospital:    Recommended diet: ADULT TUBE FEEDING; PEG; Standard with Fiber; Continuous; 40; No; 200; Q 4 hours     Recommended activity: activity as tolerated    The following personal items were collected during your admission and were returned to you:    Belongings  Dental Appliances: None  Vision - Corrective Lenses: None  Hearing Aid: None  Clothing: At home  Jewelry: None  Electronic Devices: None  Weapons (Notify Protective Services/Security): None  Other Valuables: At home  Home Medications: None  Valuables Given To: Other (Comment) (n/a)  Provide Name(s) of Who Valuable(s) Were Given To: n/a    Information obtained by:  By signing below, I understand that if any problems occur once I leave the hospital I am to contact Dr. Awadalla.  I understand and acknowledge receipt of the instructions indicated above.

## 2024-10-03 NOTE — PROGRESS NOTES
[facility]  [facility]  Progress Note    CC: aspiration pneumonia  Face to face encounter   SUBJECTIVE:  10/3/2024  Patient resting in bed- on 2 liters nasal canula. No distress. Has been afebrile.  Creat- 1.2     10/2/2024  Patient is in bed- has been afebrile. On 2 liters nasal canula.   On tube feeds.   Patient is tolerating medications. No reported adverse drug reactions.    Medications:  Scheduled Meds:   meropenem  1,000 mg IntraVENous Q8H    doxycycline hyclate  100 mg Oral 2 times per day    ferrous Sulfate  220 mg Oral Daily    metoprolol tartrate  25 mg PEG Tube BID    ipratropium 0.5 mg-albuterol 2.5 mg  1 Dose Inhalation Q4H WA RT    budesonide  500 mcg Nebulization BID RT    pantoprazole (PROTONIX) 40 mg in sodium chloride (PF) 0.9 % 10 mL injection  40 mg IntraVENous Q12H     Continuous Infusions:   dextrose       PRN Meds:sodium phosphate 12.84 mmol in sodium chloride 0.9 % 250 mL IVPB **OR** sodium phosphate 25.65 mmol in sodium chloride 0.9 % 250 mL IVPB, magnesium sulfate, potassium chloride **OR** potassium alternative oral replacement **OR** potassium chloride, HYDROcodone-acetaminophen, albuterol, glucose, dextrose bolus **OR** dextrose bolus, glucagon (rDNA), dextrose  OBJECTIVE:  Patient Vitals for the past 24 hrs:   BP Temp Temp src Pulse Resp SpO2   10/03/24 0805 (!) 172/84 -- -- 73 -- --   10/03/24 0517 131/61 97.8 °F (36.6 °C) Oral 59 16 96 %   10/02/24 2135 138/62 -- -- 68 -- --   10/02/24 1800 (!) 127/57 98 °F (36.7 °C) Oral 65 16 97 %     Constitutional: The patient is awake, In bed. No distress    Skin: Warm and dry.   Head: Eyes show round, and reactive pupils. No jaundice.   Mouth: Moist mucous membranes, no ulcerations.  Neck: Supple to movements.   Chest: No use of accessory muscles to breathe. Symmetrical expansion. Auscultation reveals diminished to bases.  Coarse upper airways  Cardiovascular: S1 and S2 are rhythmic and regular.   Abdomen: Positive

## 2024-10-03 NOTE — PROGRESS NOTES
OCCUPATIONAL THERAPY TREATMENT NOTE    [facility]   [address] [facility]        Date:10/3/2024  Patient Name: Cuate Stone  MRN: [id number]  : 1950  Room: [address] [place] [address]      Evaluating OT: Margo Romo OTR/L; [id number]      Referring Provider and Specific Provider Orders/Date:      24   OT eval and treat  Start:  24,   End:  24,   ONE TIME,   Standing Count:  1 Occurrences,   R         Kathy, Ismail U, DO       Placement Recommendation: Subacute         Diagnosis:   1. Acute respiratory failure with hypoxia and hypercapnia    2. Coffee ground emesis    3. Shock    4. Gastrointestinal hemorrhage, unspecified gastrointestinal hemorrhage type    5. Respiratory distress    6. Atrial fibrillation with rapid ventricular response (HCC)         Surgery: none        Pertinent Medical History:       Past Medical History        Past Medical History:   Diagnosis Date    Acquired hypothyroidism 2016    Acute renal failure (HCC)      Arthritis      Asthma      History of cardiovascular stress test 2016     lexiscan    Hypertension      Stroke (cerebrum) (HCC)              Past Surgical History         Past Surgical History:   Procedure Laterality Date    CHOLECYSTECTOMY   2015    HERNIA REPAIR        Dr Perkins           Precautions:  Fall Risk, PEG, hx of CVA with residual right sided weakness, joi lift for tranfers, O2        Assessment of current deficits:     [x] Functional mobility            [x]ADLs           [x] Strength                   []Cognition    [x] Functional transfers          [x] IADLs          [] Safety Awareness   [x]Endurance    [x] Fine Coordination              [x] Balance      [] Vision/perception    []Sensation      [x]Gross Motor Coordination  [x] ROM           [] Delirium                   [x] Motor Control      OT PLAN OF CARE   OT POC based on physician orders, patient diagnosis  and results of clinical assessment     Frequency/Duration 1-3 days/wk for 2 weeks PRN      Specific OT Treatment Interventions to include:   * Instruction/training on adapted ADL techniques and AE recommendations to increase functional independence within precautions       * Training on energy conservation strategies, correct breathing pattern and techniques to improve independence/tolerance for self-care routine  * Functional transfer/mobility training/DME recommendations for increased independence, safety, and fall prevention  * Patient/Family education to increase follow through with safety techniques and functional independence  * Recommendation of environmental modifications for increased safety with functional transfers/mobility and ADLs  * Therapeutic exercise to improve motor endurance, ROM, and functional strength for ADLs/functional transfers  * Therapeutic activities to facilitate/challenge dynamic balance, stand tolerance for increased safety and independence with ADLs  * Positioning to improve skin integrity, interaction with environment and functional independence     Recommended Adaptive Equipment: TBD at rehab      Home Living: pt came to us from [facility]       Equipment owned: SNF equipment     Prior Level of Function: Needs assistance with ADLs and IADLs; non-ambulatory, joi lift for transfers, wheelchair mobility with assistance      Pain Level: no pain; Nursing notified.                Cognition: A&O: 3/4; Follows 1 step directions              Memory: fair               Sequencing: fair               Problem solving: fair               Judgement/safety: fair      [facility]   AM-PAC Daily Activity - Inpatient   How much help is needed for putting on and taking off regular lower body clothing?: Total  How much help is needed for bathing (which includes washing, rinsing, drying)?: A Lot  How much help is needed for toileting (which includes using toilet, bedpan, or urinal)?:  Total  How much help is needed for putting on and taking off regular upper body clothing?: A Lot  How much help is needed for taking care of personal grooming?: A Little  How much help for eating meals?: A Little  AM-[facility] Inpatient Daily Activity Raw Score: 12  AM-PAC Inpatient ADL T-Scale Score : 30.6  ADL Inpatient CMS 0-100% Score: 66.57  ADL Inpatient CMS G-Code Modifier : CL                Functional Assessment:     Initial Eval Status  Date: 10/1/24 Treatment Status  Date: 10/3/24 STGs = LTGs  Time frame: 10-14 days   Feeding Supervision  N/t  Independent    Grooming Minimal Assist   MIN A pt seated EOB requiring assist 2* to maintain sitting balance to engage in dynamic activity  Supervision    UB Dressing Maximal Assist  MAX A simulated  Minimal Assist    LB Dressing Dependent  Dep  Moderate Assist    Bathing Maximal Assist  N/t  Minimal Assist    Toileting Dependent  N/t  Minimal Assist    Bed Mobility  Supine to sit: Maximal Assist x 2   Sit to supine: Maximal Assist x 2   Rolling:Maximal Assist x 2   Supine<>sit MAX A pt requiring assist to bring B LE in/out of bed with trunk control v/c's for hand placement and technique  Supine to sit: Minimal Assist   Sit to supine: Minimal Assist   Rolling:Minimal Assist     Balance Sitting:     Static: poor at EOB, maximal to moderate assist     Dynamic: poor Sitting:  Static: MIN/MOD A with UE hand support  Dynamic: MOD A   Standing: n/t  Sitting:     Static: good     Dynamic: fair    Activity Tolerance Fair minus Fair-  good    Visual/  Perceptual Glasses: yes                         Comments: Upon arrival pt supine in bed, agreeable to therapy session. Pt educated with regards to bed mobility, hand placement, static/dynamic sitting balance, safety awareness, importance of increased activity, OT role during therapy. At end of session pt supine in bed,  all lines and tubes intact, call light within reach.     Pt has made fair-  progress towards set goals.   Continue  with current plan of care      Treatment Time In:1316            Treatment Time Out: 1333                Treatment Charges: Mins Units   Ther Ex  [id number]     Manual Therapy 81621     Thera Activities [id number] 17 1   ADL/Home Mgt [id number]     Neuro Re-ed [id number]     Group Therapy      Orthotic manage/training  61172     Non-Billable Time     Total Timed Treatment 17 1       Treatment Time additionally includes review of current medical information, gathering information on past medical history/social history and prior level of function, interpretation of standardized testing/informal observation of tasks, treatment for plan of care and goals.     Jericho SELLERS/RUFINO [id number]

## 2024-10-03 NOTE — PROGRESS NOTES
Physical Therapy Treatment Note/Plan of Care    Room #:  0338/0338-02  Patient Name: Cuate Stone  YOB: 1950  MRN: [id number]    Date of Service: 10/3/2024     Tentative placement recommendation: Skilled Nursing Facility with Physical Therapy   Equipment recommendation: Equipment at Nursing Home      Evaluating Physical Therapist: Ricki Martin, PT #[id number]      Specific Provider Orders/Date/Referring Provider :   09/30/24 0515    PT eval and treat  Start:  09/30/24 0515,   End:  09/30/24 0515,   ONE TIME,   Standing Count:  1 Occurrences,   R       Kathy, Ismail U, DO     Admitting Diagnosis:   Shock [R57.9]  Respiratory distress [R06.03]  Coffee ground emesis [K92.0]  Atrial fibrillation with rapid ventricular response (HCC) [I48.91]  Acute respiratory failure with hypoxia and hypercapnia [J96.01, J96.02]  Gastrointestinal hemorrhage, unspecified gastrointestinal hemorrhage type [K92.2]      Surgery: none  Visit Diagnoses         Codes    Coffee ground emesis     K92.0    Shock     R57.9    Gastrointestinal hemorrhage, unspecified gastrointestinal hemorrhage type     K92.2    Respiratory distress     R06.03    Atrial fibrillation with rapid ventricular response (HCC)     I48.91            Patient Active Problem List   Diagnosis    Inguinal hernia    Acute renal failure (HCC)    Acute cholecystitis    Acute blood loss anemia    Acquired hypothyroidism    Gross hematuria    Hypertension    History of stroke    Respiratory failure    Severe protein-calorie malnutrition (HCC)    Acute respiratory failure with hypoxia and hypercapnia    Palliative care encounter        ASSESSMENT of Current Deficits Patient exhibits decreased strength, balance, and endurance impairing functional mobility, transfers, gait distance, and tolerance to activity. R hemiparesis from previous CVA. PEG tube to gravity. Patient pleasant and motivated to participate with therapy. Patient needing max assist to edge of bed, however  Acute renal failure (HCC)     Arthritis     Asthma     History of cardiovascular stress test 03/23/2016    lexiscan    Hypertension     Stroke (cerebrum) (HCC)      Past Surgical History:   Procedure Laterality Date    CHOLECYSTECTOMY  7/17/2015    HERNIA REPAIR  2005    Dr Perkins        SUBJECTIVE:    Precautions: Use lift equipment for lifting patient , falls R hemiparesis from previous CVA, peg tube to gravity, shipman, 2 IV sites on Left arm, usually wears O2 at nursing home but not sure how much     Social history: Patient from LTC at [facility]    Equipment owned: Equipment at Nursing Home,      AM-[facility] Basic Mobility       AM-PAC Basic Mobility - Inpatient   How much help is needed turning from your back to your side while in a flat bed without using bedrails?: A Little  How much help is needed moving from lying on your back to sitting on the side of a flat bed without using bedrails?: A Lot  How much help is needed moving to and from a bed to a chair?: Total  How much help is needed standing up from a chair using your arms?: Total  How much help is needed walking in hospital room?: Total  How much help is needed climbing 3-5 steps with a railing?: Total  AM-PAC Inpatient Mobility Raw Score : 9  AM-PAC Inpatient T-Scale Score : 30.55  Mobility Inpatient CMS 0-100% Score: 81.38  Mobility Inpatient CMS G-Code Modifier : CM    Nursing cleared patient for PT treatment.      OBJECTIVE:   Initial Evaluation  Date: 10/1/2024 Treatment Date:    10/3/2024 Short Term/ Long Term   Goals   Was pt agreeable to Eval/treatment? Yes yes To be met in 4 days   Pain level   0/10   0/10    Bed Mobility  Using rails and head of bed elevated:     Rolling: Maximal assist of  2    Supine to sit: Maximal assist of  2    Sit to supine: Maximal assist of  2    Scooting: Maximal assist of  2   Using rails and head of bed elevated:     Rolling: Minimal assist of 1   Supine to sit: Maximal assist of 1   Sit to supine: Maximal assist of 1

## 2024-10-03 NOTE — CARE COORDINATION
CM note:  Notified by nursing that patient would be getting a midline and then can discharge back to his facility, [facility].  Transportation arranged for 5 PM to allow time for line placement.  Facility liaison notified and message given to  at Attmisa Tan' office, IMM faxed to office as well.

## 2024-10-03 NOTE — PLAN OF CARE
Problem: Safety - Adult  Goal: Free from fall injury  10/3/2024 0028 by Shanelle Donahue RN  Outcome: Progressing     Problem: Discharge Planning  Goal: Discharge to home or other facility with appropriate resources  10/3/2024 0028 by Shanelle Donahue RN  Outcome: Progressing     Problem: Skin/Tissue Integrity  Goal: Absence of new skin breakdown  Description: 1.  Monitor for areas of redness and/or skin breakdown  2.  Assess vascular access sites hourly  3.  Every 4-6 hours minimum:  Change oxygen saturation probe site  4.  Every 4-6 hours:  If on nasal continuous positive airway pressure, respiratory therapy assess nares and determine need for appliance change or resting period.  10/3/2024 0028 by Shanelle Donahue RN  Outcome: Progressing     Problem: Safety - Medical Restraint  Goal: Remains free of injury from restraints (Restraint for Interference with Medical Device)  Description: INTERVENTIONS:  1. Determine that other, less restrictive measures have been tried or would not be effective before applying the restraint  2. Evaluate the patient's condition at the time of restraint application  3. Inform patient/family regarding the reason for restraint  4. Q2H: Monitor safety, psychosocial status, comfort, nutrition and hydration  10/3/2024 0028 by Shanelle Donahue RN  Outcome: Progressing     Problem: Pain  Goal: Verbalizes/displays adequate comfort level or baseline comfort level  10/3/2024 0028 by Shanelle Donahue RN  Outcome: Progressing     Problem: Chronic Conditions and Co-morbidities  Goal: Patient's chronic conditions and co-morbidity symptoms are monitored and maintained or improved  10/3/2024 0028 by Shanelle Donahue RN  Outcome: Progressing

## 2024-10-04 LAB
MICROORGANISM SPEC CULT: NORMAL
SERVICE CMNT-IMP: NORMAL
SPECIMEN DESCRIPTION: NORMAL

## 2024-10-04 NOTE — DISCHARGE SUMMARY
[address] [address]                            DISCHARGE SUMMARY      PATIENT NAME: CARLOS YI                : 1950  MED REC NO: [id number]                        ROOM: 0338  ACCOUNT NO: [id number]                       ADMIT DATE: 2024  PROVIDER: Antoine Hodges DO      ADMITTING DIAGNOSES:  Septic shock secondary to aspiration pneumonia, secondary to gastrointestinal bleed with persistent coffee-grounds emesis, helpful from the PEG tube with resultant blood loss anemia, acute renal failure with chronic indwelling Devine catheter, history of stroke with residual deficit, right hemiparesis, dyspraxia and dysphasia, compensated diastolic congestive heart failure, primary hypothyroidism, essential hypertension, hyperlipidemia, gastroesophageal reflux disease, history of tobacco abuse.    COMPLICATION:  Acute hypoxemic respiratory failure, requiring mechanical ventilation with associated metabolic encephalopathy.    CONSULTATIONS OBTAINED:  With Critical Care, Dr. Bolaños, Palliative Care, Gastroenterology, and General Surgery.  No OMT was given.    ADDITIONAL ADMITTING PHYSICIAN:  Dr. Freire.    CHIEF COMPLAINT AND HISTORY OF CHIEF COMPLAINT:  This is a 73-year-old white male who was admitted to [facility].  The patient presented to the hospital here from a nearby nursing home.  The patient presented to emergency room with complaints of shortness of breath and difficulty breathing.  The patient apparently did have coffee-ground emesis, resulting in at which time the patient was intubated in the emergency room.  The patient brought in the day earlier at which time from the nursing home for similar symptomatology.  The patient was subsequently discharged back.  He presented here.  The patient is currently DNR-CCA.  After being intubated, the family did request change to DNR CC status.    PAST MEDICAL HISTORY:   Positive for usual childhood disease, primary hypothyroidism, acute kidney injury, arthritis, asthma, history of stress test, hypertension, and prior history of stroke.    PHYSICAL EXAMINATION:  VITAL SIGNS: Show blood pressure to be 85/59, pulse 113, respirations 20, O2 saturation 98%.  GENERAL APPEARANCE:  This is a 73-year-old white male who is intubated and sedated.    NECK: Revealed flat JVD.  LUNGS:  Revealed mechanical breath sounds.  HEART: Tachycardic.  ABDOMEN:  Soft.  PEG tube in place.    EXTREMITIES: Without edema.    LABORATORY DATA:  While the patient was in the hospital, he had the following laboratory studies performed.  Hemoglobin and hematocrit 14.2 and 45.7 respectively, white count 14,000, platelet count adequate.  BUN and creatinine were 60 and 1.6 respectively.  Lipase was stable.    HOSPITAL COURSE BY STAY:  The patient was admitted directly to the hospital to the intensive coronary care unit.  The patient because of respiratory distress, was intubated at this time in the emergency room.  The patient was admitted to the hospital to the intensive care unit.  Multiple subspecialists were employed at this time including Critical Care, Infectious Disease, Gastroenterology.  The patient was given supportive care at this time.  For the most part, the patient did seem to improve.  The patient at this time remained sedated after discussion with the caregiver.  The patient was subsequently extubated at this time.  The patient was transfused accordingly and wanted to proceed with conservative care.  He was transferred to the immediate care unit.  For the most part, he resumed back on his tube feeding.  Optimal care was given at which time the family did request a DNR-CC status.  The patient was stable at the time of pullback, discharged to nursing home on the 3rd.  At the time of discharge on 3rd, revealed the following. His glucose was 102.  His BUN and creatinine were 36 and 1.2 respectively.  Sodium  151, potassium 3.8, chloride 119, CO2 27.  His phosphorus was 1 which was replaced.  Hemoglobin and hematocrit 10.6 and 34.1 respectively, white count 7000, platelet count adequate. The type of wound which he has was methicillin-resistant Staph aureus and blood culture has been negative.  The patient was discharged back to the nursing home after placement of the PICC line.  The patient's blood pressure at the time of discharge was 172/84, pulse 73, respirations 16, O2 saturation 96%.  Height 5 feet 11 inches, weight 176.  The patient will continue on doxycycline 100 q.12 for 6 days, imipenem 1 g daily for 6 days.  The patient will continue ferrous sulfate 325 q.12, metoprolol-XL 25 mg daily.  The patient will continue Tylenol 500 q.6 p.r.n., atorvastatin 40 daily, Colace 100 q.12, nitroglycerin p.r.n., Phenergan 12.5 daily along with multivitamin daily.  The patient will continue his tube feedings as clinically indicated along with close monitoring of his lab work.  His prior legal guardian did request a DNR-CC status.  Further adjustment will be made if clinically indicated.      More than 40 minutes was spent on the day of discharge, more than 50% of time spent face-to-face with the patient, discussing plan of care and treatment at this time, formulating plan, coordinating discharge at this time.  The patient was improved by the time of discharge, but prognosis is guarded regarding treatment plan.          MADAI LEIVA DO      D:  10/03/2024 16:46:52     T:  10/04/2024 04:11:01     RAFAEL/MARY ANN  Job #:  [id number]     Doc#:  [id number]

## 2024-10-08 ENCOUNTER — OUTSIDE SERVICES (OUTPATIENT)
Dept: INTERNAL MEDICINE CLINIC | Age: 74
End: 2024-10-08
Payer: MEDICARE

## 2024-10-08 DIAGNOSIS — D64.9 ANEMIA, UNSPECIFIED TYPE: ICD-10-CM

## 2024-10-08 DIAGNOSIS — I69.391 DYSPHAGIA FOLLOWING CEREBRAL INFARCTION: ICD-10-CM

## 2024-10-08 DIAGNOSIS — N18.30 STAGE 3 CHRONIC KIDNEY DISEASE, UNSPECIFIED WHETHER STAGE 3A OR 3B CKD (HCC): ICD-10-CM

## 2024-10-08 DIAGNOSIS — I50.30 DIASTOLIC CONGESTIVE HEART FAILURE, UNSPECIFIED HF CHRONICITY (HCC): ICD-10-CM

## 2024-10-08 DIAGNOSIS — K57.32 DVTRCLI OF LG INT W/O PERFORATION OR ABSCESS W/O BLEEDING: ICD-10-CM

## 2024-10-08 DIAGNOSIS — I48.92 ATRIAL FLUTTER, UNSPECIFIED TYPE (HCC): ICD-10-CM

## 2024-10-08 DIAGNOSIS — Z86.73 HISTORY OF STROKE: Primary | ICD-10-CM

## 2024-10-08 DIAGNOSIS — N40.1 BENIGN PROSTATIC HYPERPLASIA WITH LOWER URINARY TRACT SYMPTOMS, SYMPTOM DETAILS UNSPECIFIED: ICD-10-CM

## 2024-10-08 DIAGNOSIS — I10 ESSENTIAL (PRIMARY) HYPERTENSION: ICD-10-CM

## 2024-10-08 DIAGNOSIS — E44.1 MILD PROTEIN-CALORIE MALNUTRITION (HCC): ICD-10-CM

## 2024-10-08 DIAGNOSIS — I25.10 ATHSCL HEART DISEASE OF NATIVE CORONARY ARTERY W/O ANG PCTRS: ICD-10-CM

## 2024-10-08 PROCEDURE — 99305 1ST NF CARE MODERATE MDM 35: CPT | Performed by: INTERNAL MEDICINE

## 2024-10-08 ASSESSMENT — ENCOUNTER SYMPTOMS
SHORTNESS OF BREATH: 0
VOMITING: 0
DIARRHEA: 0
ABDOMINAL PAIN: 0
NAUSEA: 0

## 2024-10-08 NOTE — PROGRESS NOTES
Exam  Constitutional:       General: He is awake. He is not in acute distress.     Appearance: He is ill-appearing.   HENT:      Head: Normocephalic and atraumatic.      Right Ear: External ear normal.      Left Ear: External ear normal.      Nose: Nose normal.   Eyes:      Extraocular Movements: Extraocular movements intact.      Pupils: Pupils are equal, round, and reactive to light.   Cardiovascular:      Rate and Rhythm: Normal rate and regular rhythm.      Heart sounds: S1 normal and S2 normal. No murmur heard.     No friction rub. No gallop.   Pulmonary:      Breath sounds: Examination of the right-lower field reveals decreased breath sounds. Examination of the left-lower field reveals decreased breath sounds. Decreased breath sounds present.   Chest:      Chest wall: No tenderness.   Abdominal:      General: Bowel sounds are normal. There is no distension.      Palpations: Abdomen is soft. There is no mass.      Tenderness: There is no abdominal tenderness.      Comments: No organomegaly  PEG tube in place   Genitourinary:     Comments: Devine catheter in place  Musculoskeletal:         General: No tenderness. Normal range of motion.      Cervical back: Neck supple. No tenderness.      Right lower leg: No edema.      Left lower leg: No edema.      Comments: No clubbing, No cyanosis   Skin:     General: Skin is warm and dry.   Neurological:      General: No focal deficit present.      Mental Status: He is alert. He is confused.      Cranial Nerves: Dysarthria (slight) present.      Motor: Weakness present.      Gait: Gait abnormal.      Comments: Some right-sided hemiparesis   Psychiatric:         Behavior: Behavior is cooperative.           Assessment & Plan:  1. Dysphagia following cerebral infarction  PEG tube in place  Well tolerated no reported issues  Continue with tube feeding   2. Anemia, unspecified type  Hemoglobin stable  3. Dvtrcli of lg int w/o perforation or abscess w/o bleeding  4. Mild

## 2024-11-12 ENCOUNTER — OUTSIDE SERVICES (OUTPATIENT)
Dept: INTERNAL MEDICINE CLINIC | Age: 74
End: 2024-11-12
Payer: MEDICARE

## 2024-11-12 DIAGNOSIS — I48.92 ATRIAL FLUTTER, UNSPECIFIED TYPE (HCC): ICD-10-CM

## 2024-11-12 DIAGNOSIS — N40.1 BENIGN PROSTATIC HYPERPLASIA WITH LOWER URINARY TRACT SYMPTOMS, SYMPTOM DETAILS UNSPECIFIED: ICD-10-CM

## 2024-11-12 DIAGNOSIS — I50.30 DIASTOLIC CONGESTIVE HEART FAILURE, UNSPECIFIED HF CHRONICITY (HCC): ICD-10-CM

## 2024-11-12 DIAGNOSIS — I69.391 DYSPHAGIA FOLLOWING CEREBRAL INFARCTION: ICD-10-CM

## 2024-11-12 DIAGNOSIS — I25.10 ATHSCL HEART DISEASE OF NATIVE CORONARY ARTERY W/O ANG PCTRS: ICD-10-CM

## 2024-11-12 DIAGNOSIS — D64.9 ANEMIA, UNSPECIFIED TYPE: ICD-10-CM

## 2024-11-12 DIAGNOSIS — E44.1 MILD PROTEIN-CALORIE MALNUTRITION (HCC): ICD-10-CM

## 2024-11-12 DIAGNOSIS — Z86.73 HISTORY OF STROKE: Primary | ICD-10-CM

## 2024-11-12 DIAGNOSIS — I10 ESSENTIAL (PRIMARY) HYPERTENSION: ICD-10-CM

## 2024-11-12 DIAGNOSIS — N18.30 STAGE 3 CHRONIC KIDNEY DISEASE, UNSPECIFIED WHETHER STAGE 3A OR 3B CKD (HCC): ICD-10-CM

## 2024-11-12 DIAGNOSIS — K57.32 DVTRCLI OF LG INT W/O PERFORATION OR ABSCESS W/O BLEEDING: ICD-10-CM

## 2024-11-12 PROCEDURE — 99309 SBSQ NF CARE MODERATE MDM 30: CPT | Performed by: INTERNAL MEDICINE

## 2024-11-12 ASSESSMENT — ENCOUNTER SYMPTOMS
NAUSEA: 0
VOMITING: 0
DIARRHEA: 0
SHORTNESS OF BREATH: 0
ABDOMINAL PAIN: 0

## 2025-01-14 ENCOUNTER — OUTSIDE SERVICES (OUTPATIENT)
Dept: INTERNAL MEDICINE CLINIC | Age: 75
End: 2025-01-14
Payer: MEDICARE

## 2025-01-14 DIAGNOSIS — I25.10 ATHSCL HEART DISEASE OF NATIVE CORONARY ARTERY W/O ANG PCTRS: ICD-10-CM

## 2025-01-14 DIAGNOSIS — E44.1 MILD PROTEIN-CALORIE MALNUTRITION (HCC): ICD-10-CM

## 2025-01-14 DIAGNOSIS — N40.1 BENIGN PROSTATIC HYPERPLASIA WITH LOWER URINARY TRACT SYMPTOMS, SYMPTOM DETAILS UNSPECIFIED: ICD-10-CM

## 2025-01-14 DIAGNOSIS — N18.30 STAGE 3 CHRONIC KIDNEY DISEASE, UNSPECIFIED WHETHER STAGE 3A OR 3B CKD (HCC): ICD-10-CM

## 2025-01-14 DIAGNOSIS — D64.9 ANEMIA, UNSPECIFIED TYPE: ICD-10-CM

## 2025-01-14 DIAGNOSIS — I69.391 DYSPHAGIA FOLLOWING CEREBRAL INFARCTION: ICD-10-CM

## 2025-01-14 DIAGNOSIS — K57.32 DVTRCLI OF LG INT W/O PERFORATION OR ABSCESS W/O BLEEDING: ICD-10-CM

## 2025-01-14 DIAGNOSIS — I50.30 DIASTOLIC CONGESTIVE HEART FAILURE, UNSPECIFIED HF CHRONICITY (HCC): ICD-10-CM

## 2025-01-14 DIAGNOSIS — I10 ESSENTIAL (PRIMARY) HYPERTENSION: ICD-10-CM

## 2025-01-14 DIAGNOSIS — Z86.73 HISTORY OF STROKE: Primary | ICD-10-CM

## 2025-01-14 PROCEDURE — 99308 SBSQ NF CARE LOW MDM 20: CPT | Performed by: INTERNAL MEDICINE

## 2025-01-14 ASSESSMENT — ENCOUNTER SYMPTOMS
ABDOMINAL PAIN: 0
VOMITING: 0
SHORTNESS OF BREATH: 0
DIARRHEA: 0
NAUSEA: 0

## 2025-01-14 NOTE — PROGRESS NOTES
Visit Date: 1/14/2025 - [facility] Eugenio Cuello Bertha  1950  male 74 y.o.      Subjective:    CC: Patient presents with no complaints.  Charts and meds reviewed  Tolerating tube feeds    HPI: Patient seen and examined.   No changes since last visit      ROS:  Review of Systems   Constitutional:  Negative for chills and fever.   Respiratory:  Negative for shortness of breath.    Cardiovascular:  Negative for chest pain.   Gastrointestinal:  Negative for abdominal pain, diarrhea, nausea and vomiting.   Genitourinary:  Negative for dysuria and frequency.   Musculoskeletal:  Positive for gait problem.   Neurological:  Positive for weakness. Negative for dizziness and headaches.        Current Meds: Refer to nursing home record    PMH:    Medical Problems: reviewed and updated       Diagnosis Date    Acquired hypothyroidism 03/09/2016    Acute renal failure (HCC)     Arthritis     Asthma     History of cardiovascular stress test 03/23/2016    lexiscan    Hypertension     Stroke (cerebrum) (HCC)         Surgical Hx: reviewed and updated   Past Surgical History:   Procedure Laterality Date    CHOLECYSTECTOMY  7/17/2015    HERNIA REPAIR  2005    Dr Perkins         FH: reviewed and updated       Problem Relation Age of Onset    Heart Failure Mother     Heart Disease Mother         CABG    Heart Failure Father         SH: reviewed and updated    reports that he quit smoking about 14 years ago. His smoking use included cigarettes. He has quit using smokeless tobacco. He reports that he does not drink alcohol and does not use drugs.     Objective:  Vitals reviewed, please refer to Nursing home chart     Physical Exam  Constitutional:       General: He is awake. He is not in acute distress.     Appearance: He is ill-appearing.   HENT:      Head: Normocephalic and atraumatic.      Right Ear: External ear normal.      Left Ear: External ear normal.      Nose: Nose normal.   Eyes:      Extraocular Movements: Extraocular movements

## 2025-03-11 ENCOUNTER — OUTSIDE SERVICES (OUTPATIENT)
Dept: INTERNAL MEDICINE CLINIC | Age: 75
End: 2025-03-11
Payer: MEDICARE

## 2025-03-11 DIAGNOSIS — I69.391 DYSPHAGIA FOLLOWING CEREBRAL INFARCTION: ICD-10-CM

## 2025-03-11 DIAGNOSIS — Z86.73 HISTORY OF STROKE: Primary | ICD-10-CM

## 2025-03-11 DIAGNOSIS — D64.9 ANEMIA, UNSPECIFIED TYPE: ICD-10-CM

## 2025-03-11 DIAGNOSIS — I25.10 ATHSCL HEART DISEASE OF NATIVE CORONARY ARTERY W/O ANG PCTRS: ICD-10-CM

## 2025-03-11 DIAGNOSIS — I10 ESSENTIAL (PRIMARY) HYPERTENSION: ICD-10-CM

## 2025-03-11 DIAGNOSIS — N40.1 BENIGN PROSTATIC HYPERPLASIA WITH LOWER URINARY TRACT SYMPTOMS, SYMPTOM DETAILS UNSPECIFIED: ICD-10-CM

## 2025-03-11 DIAGNOSIS — I50.30 DIASTOLIC CONGESTIVE HEART FAILURE, UNSPECIFIED HF CHRONICITY (HCC): ICD-10-CM

## 2025-03-11 DIAGNOSIS — N18.30 STAGE 3 CHRONIC KIDNEY DISEASE, UNSPECIFIED WHETHER STAGE 3A OR 3B CKD (HCC): ICD-10-CM

## 2025-03-11 DIAGNOSIS — E44.1 MILD PROTEIN-CALORIE MALNUTRITION: ICD-10-CM

## 2025-03-11 DIAGNOSIS — K57.32 DVTRCLI OF LG INT W/O PERFORATION OR ABSCESS W/O BLEEDING: ICD-10-CM

## 2025-03-11 PROCEDURE — 99309 SBSQ NF CARE MODERATE MDM 30: CPT | Performed by: INTERNAL MEDICINE

## 2025-03-11 ASSESSMENT — ENCOUNTER SYMPTOMS
ABDOMINAL PAIN: 0
DIARRHEA: 0
VOMITING: 0
SHORTNESS OF BREATH: 0
NAUSEA: 0

## 2025-03-11 NOTE — PROGRESS NOTES
Visit Date: 3/11/2025 - [facility] Eugenio Cuello Bertha  1950  male 74 y.o.      Subjective:    CC: Patient presents with no complaints.  Charts and meds reviewed  Tolerating tube feeds    HPI: Patient seen and examined.   No changes since last visit      ROS:  Review of Systems   Constitutional:  Negative for chills and fever.   Respiratory:  Negative for shortness of breath.    Cardiovascular:  Negative for chest pain.   Gastrointestinal:  Negative for abdominal pain, diarrhea, nausea and vomiting.   Genitourinary:  Negative for dysuria and frequency.   Musculoskeletal:  Positive for gait problem.   Neurological:  Positive for weakness. Negative for dizziness and headaches.        Current Meds: Refer to nursing home record    PMH:    Medical Problems: reviewed and updated       Diagnosis Date    Acquired hypothyroidism 03/09/2016    Acute renal failure     Arthritis     Asthma     History of cardiovascular stress test 03/23/2016    lexiscan    Hypertension     Stroke (cerebrum) (HCC)         Surgical Hx: reviewed and updated   Past Surgical History:   Procedure Laterality Date    CHOLECYSTECTOMY  7/17/2015    HERNIA REPAIR  2005    Dr Perkins         FH: reviewed and updated       Problem Relation Age of Onset    Heart Failure Mother     Heart Disease Mother         CABG    Heart Failure Father         SH: reviewed and updated    reports that he quit smoking about 14 years ago. His smoking use included cigarettes. He has quit using smokeless tobacco. He reports that he does not drink alcohol and does not use drugs.     Objective:  Vitals reviewed, please refer to Nursing home chart     Physical Exam  Constitutional:       General: He is awake. He is not in acute distress.     Appearance: He is ill-appearing.   HENT:      Head: Normocephalic and atraumatic.      Right Ear: External ear normal.      Left Ear: External ear normal.      Nose: Nose normal.   Eyes:      Extraocular Movements: Extraocular movements 
Ambulatory

## 2025-04-01 ENCOUNTER — APPOINTMENT (OUTPATIENT)
Dept: GENERAL RADIOLOGY | Age: 75
End: 2025-04-01
Payer: MEDICARE

## 2025-04-01 ENCOUNTER — HOSPITAL ENCOUNTER (EMERGENCY)
Age: 75
Discharge: HOME | End: 2025-04-02
Attending: STUDENT IN AN ORGANIZED HEALTH CARE EDUCATION/TRAINING PROGRAM
Payer: MEDICARE

## 2025-04-01 ENCOUNTER — APPOINTMENT (OUTPATIENT)
Dept: CT IMAGING | Age: 75
End: 2025-04-01
Payer: MEDICARE

## 2025-04-01 VITALS
DIASTOLIC BLOOD PRESSURE: 66 MMHG | OXYGEN SATURATION: 98 % | HEIGHT: 71 IN | SYSTOLIC BLOOD PRESSURE: 108 MMHG | WEIGHT: 176 LBS | TEMPERATURE: 97.9 F | RESPIRATION RATE: 14 BRPM | BODY MASS INDEX: 24.64 KG/M2 | HEART RATE: 98 BPM

## 2025-04-01 DIAGNOSIS — T85.528A DISLODGED GASTROSTOMY TUBE: Primary | ICD-10-CM

## 2025-04-01 PROCEDURE — 6360000004 HC RX CONTRAST MEDICATION: Performed by: STUDENT IN AN ORGANIZED HEALTH CARE EDUCATION/TRAINING PROGRAM

## 2025-04-01 PROCEDURE — 74176 CT ABD & PELVIS W/O CONTRAST: CPT

## 2025-04-01 PROCEDURE — 43762 RPLC GTUBE NO REVJ TRC: CPT

## 2025-04-01 PROCEDURE — 99285 EMERGENCY DEPT VISIT HI MDM: CPT

## 2025-04-01 PROCEDURE — 74018 RADEX ABDOMEN 1 VIEW: CPT

## 2025-04-01 RX ORDER — DIATRIZOATE MEGLUMINE AND DIATRIZOATE SODIUM 660; 100 MG/ML; MG/ML
20 SOLUTION ORAL; RECTAL
Status: DISCONTINUED | OUTPATIENT
Start: 2025-04-01 | End: 2025-04-02 | Stop reason: HOSPADM

## 2025-04-01 RX ADMIN — DIATRIZOATE MEGLUMINE AND DIATRIZOATE SODIUM 20 ML: 600; 100 SOLUTION ORAL; RECTAL at 18:14

## 2025-04-01 ASSESSMENT — PAIN - FUNCTIONAL ASSESSMENT: PAIN_FUNCTIONAL_ASSESSMENT: NONE - DENIES PAIN

## 2025-04-01 NOTE — ED PROVIDER NOTES
[facility] EMERGENCY DEPARTMENT  EMERGENCY DEPARTMENT ENCOUNTER        Pt Name: Cuate Stone  MRN: [id number]  Birthdate 1950  Date of evaluation: 4/1/2025  Provider: Jacob Luna II, DO  PCP: Awadalla, Bahaa A, MD  Note Started: 6:04 PM EDT 4/1/25    CHIEF COMPLAINT       Chief Complaint   Patient presents with    G Tube Complications     Pt's PEG tube got pulled out this afternoon       HISTORY OF PRESENT ILLNESS: 1 or more Elements            Cuate Stone is a 74 y.o. male history of HTN, CVA in the past, brought in by EMS from nursing home for dislodged PEG tube.  Per nursing home report, patient apparently was found to have dislodged PEG tube sometime today in the early afternoon.  Nursing home staff apparently tried to replace this with a 20 Thai, unsuccessful.  Patient denies any other complaints at this time.    Nursing Notes were all reviewed and agreed with or any disagreements were addressed in the HPI.      REVIEW OF EXTERNAL NOTE :       Records reviewed for medical hx, surgical, hx, and medication lists      Chart Review/External Note Review    Last Echo reviewed by Me:  Lab Results   Component Value Date    LVEF 62 07/21/2022             Controlled Substance Monitoring:    Acute and Chronic Pain Monitoring:        No data to display                    REVIEW OF SYSTEMS :      Positives and Pertinent negatives as per HPI.     SURGICAL HISTORY     Past Surgical History:   Procedure Laterality Date    CHOLECYSTECTOMY  7/17/2015    HERNIA REPAIR  2005    Dr Perkins        CURRENTMEDICATIONS       Discharge Medication List as of 4/2/2025 12:47 AM        CONTINUE these medications which have NOT CHANGED    Details   ipratropium 0.5 mg-albuterol 2.5 mg (DUONEB) 0.5-2.5 (3) MG/3ML SOLN nebulizer solution Inhale 3 mLs into the lungs 4 times dailyDC to SNF      rivaroxaban (XARELTO) 20 MG TABS tablet 1 tablet by PEG Tube route daily (with breakfast)DC to SNF

## 2025-04-02 NOTE — ED NOTES
Nurse to nurse called to [facility] Eugenio. Accepting RN updated on pt plan and condition. Pt updated, needs met at the moment. PAS ETA 0200. Will continue to closely monitor pt.

## 2025-04-02 NOTE — DISCHARGE INSTR - COC
Continuity of Care Form    Patient Name: Cuate Stone   :  1950  MRN:  [id number]    Admit date:  2025  Discharge date:  ***    Code Status Order: Prior   Advance Directives:     Admitting Physician:  No admitting provider for patient encounter.  PCP: Awadalla, Bahaa A, MD    Discharging Nurse: ***  Discharging Hospital Unit/Room#: HALL07/H7  Discharging Unit Phone Number: ***    Emergency Contact:   Extended Emergency Contact Information  Primary Emergency Contact: Indra Tan  Work Phone: [phone]  Relation: Legal Guardian  Secondary Emergency Contact: Alejandro Partida  Address: [address] [place] [address]  Home Phone: [phone]  Mobile Phone: [phone]  Relation: Other   needed? No    Past Surgical History:  Past Surgical History:   Procedure Laterality Date    CHOLECYSTECTOMY  2015    HERNIA REPAIR      Dr Perkins        Immunization History:   Immunization History   Administered Date(s) Administered    Influenza, AFLURIA (age 3 y+), FLUZONE, (age 6 mo+), Quadv MDV, 0.5mL 2016       Active Problems:  Patient Active Problem List   Diagnosis Code    Inguinal hernia K40.90    Acute renal failure N17.9    Acute cholecystitis K81.0    Acute blood loss anemia D62    Acquired hypothyroidism E03.9    Gross hematuria R31.0    Hypertension I10    History of stroke Z86.73    Respiratory failure J96.90    Severe protein-calorie malnutrition E43    Acute respiratory failure with hypoxia and hypercapnia J96.01, J96.02    Palliative care encounter Z51.5       Isolation/Infection:   Isolation            No Isolation          Patient Infection Status        Infection Onset Added Last Indicated Last Indicated By Review Planned Expiration    ESBL (Extended Spectrum Beta Lactamase) 09/30/24 10/02/24 09/30/24 Culture, Respiratory      Sputum 24    MRSA 22 Culture, Respiratory      Urine 22  Nares 22  Respiratory

## 2025-04-08 ENCOUNTER — OUTSIDE SERVICES (OUTPATIENT)
Dept: INTERNAL MEDICINE CLINIC | Age: 75
End: 2025-04-08
Payer: MEDICARE

## 2025-04-08 DIAGNOSIS — I69.391 DYSPHAGIA FOLLOWING CEREBRAL INFARCTION: ICD-10-CM

## 2025-04-08 DIAGNOSIS — E44.1 MILD PROTEIN-CALORIE MALNUTRITION: ICD-10-CM

## 2025-04-08 DIAGNOSIS — N18.30 STAGE 3 CHRONIC KIDNEY DISEASE, UNSPECIFIED WHETHER STAGE 3A OR 3B CKD (HCC): ICD-10-CM

## 2025-04-08 DIAGNOSIS — D64.9 ANEMIA, UNSPECIFIED TYPE: ICD-10-CM

## 2025-04-08 DIAGNOSIS — I48.92 ATRIAL FLUTTER, UNSPECIFIED TYPE (HCC): ICD-10-CM

## 2025-04-08 DIAGNOSIS — N40.1 BENIGN PROSTATIC HYPERPLASIA WITH LOWER URINARY TRACT SYMPTOMS, SYMPTOM DETAILS UNSPECIFIED: ICD-10-CM

## 2025-04-08 DIAGNOSIS — I25.10 ATHSCL HEART DISEASE OF NATIVE CORONARY ARTERY W/O ANG PCTRS: ICD-10-CM

## 2025-04-08 DIAGNOSIS — I10 ESSENTIAL (PRIMARY) HYPERTENSION: ICD-10-CM

## 2025-04-08 DIAGNOSIS — I50.30 DIASTOLIC CONGESTIVE HEART FAILURE, UNSPECIFIED HF CHRONICITY (HCC): ICD-10-CM

## 2025-04-08 DIAGNOSIS — K57.32 DVTRCLI OF LG INT W/O PERFORATION OR ABSCESS W/O BLEEDING: ICD-10-CM

## 2025-04-08 DIAGNOSIS — Z86.73 HISTORY OF STROKE: Primary | ICD-10-CM

## 2025-04-08 PROCEDURE — 99309 SBSQ NF CARE MODERATE MDM 30: CPT | Performed by: INTERNAL MEDICINE

## 2025-04-08 ASSESSMENT — ENCOUNTER SYMPTOMS
VOMITING: 0
SHORTNESS OF BREATH: 0
DIARRHEA: 0
ABDOMINAL PAIN: 0
NAUSEA: 0

## 2025-04-08 NOTE — PROGRESS NOTES
unspecified type (HCC)  11. Gastrostomy status (HCC)  12. Dysphagia, oropharyngeal phase  Continue with PEG tube feeding.  13. Benign prostatic hyperplasia with lower urinary tract symptoms, symptom details unspecified  14. 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 Dysarthria and anarthria  17. Constipation, unspecified constipation type  18. Onycholysis  19. Essential (primary) hypertension  Blood pressure well controlled.  Continue Lopressor 50 mg twice daily.  20. Hyperlipidemia, unspecified hyperlipidemia type  Continue atorvastatin 40 mg daily.    Continue current management  Has a legal Guardian  Poor prognosis, however he is clinically stable at this point.    Bahaa A Awadalla, MD

## 2025-05-27 ENCOUNTER — OUTSIDE SERVICES (OUTPATIENT)
Dept: INTERNAL MEDICINE CLINIC | Age: 75
End: 2025-05-27
Payer: MEDICARE

## 2025-05-27 DIAGNOSIS — I50.30 DIASTOLIC CONGESTIVE HEART FAILURE, UNSPECIFIED HF CHRONICITY (HCC): ICD-10-CM

## 2025-05-27 DIAGNOSIS — Z86.73 HISTORY OF STROKE: Primary | ICD-10-CM

## 2025-05-27 DIAGNOSIS — D64.9 ANEMIA, UNSPECIFIED TYPE: ICD-10-CM

## 2025-05-27 DIAGNOSIS — E44.1 MILD PROTEIN-CALORIE MALNUTRITION: ICD-10-CM

## 2025-05-27 DIAGNOSIS — N40.1 BENIGN PROSTATIC HYPERPLASIA WITH LOWER URINARY TRACT SYMPTOMS, SYMPTOM DETAILS UNSPECIFIED: ICD-10-CM

## 2025-05-27 DIAGNOSIS — I25.10 ATHSCL HEART DISEASE OF NATIVE CORONARY ARTERY W/O ANG PCTRS: ICD-10-CM

## 2025-05-27 DIAGNOSIS — K57.32 DVTRCLI OF LG INT W/O PERFORATION OR ABSCESS W/O BLEEDING: ICD-10-CM

## 2025-05-27 DIAGNOSIS — I10 ESSENTIAL (PRIMARY) HYPERTENSION: ICD-10-CM

## 2025-05-27 DIAGNOSIS — N18.30 STAGE 3 CHRONIC KIDNEY DISEASE, UNSPECIFIED WHETHER STAGE 3A OR 3B CKD (HCC): ICD-10-CM

## 2025-05-27 DIAGNOSIS — I48.92 ATRIAL FLUTTER, UNSPECIFIED TYPE (HCC): ICD-10-CM

## 2025-05-27 DIAGNOSIS — I69.391 DYSPHAGIA FOLLOWING CEREBRAL INFARCTION: ICD-10-CM

## 2025-05-27 PROCEDURE — 99309 SBSQ NF CARE MODERATE MDM 30: CPT | Performed by: INTERNAL MEDICINE

## 2025-05-27 ASSESSMENT — ENCOUNTER SYMPTOMS
DIARRHEA: 0
ABDOMINAL PAIN: 0
NAUSEA: 0
VOMITING: 0
SHORTNESS OF BREATH: 0

## 2025-05-27 NOTE — PROGRESS NOTES
Visit Date: 5/27/2025 - [facility] Eugenio Cuello Bertha  1950  male 74 y.o.      Subjective:    CC: Patient presents with no complaints.  Charts and meds reviewed  Tolerating tube feeds      HPI: Patient seen and examined.   No changes since last visit      ROS:  Review of Systems   Constitutional:  Negative for chills and fever.   Respiratory:  Negative for shortness of breath.    Cardiovascular:  Negative for chest pain.   Gastrointestinal:  Negative for abdominal pain, diarrhea, nausea and vomiting.   Genitourinary:  Negative for dysuria and frequency.   Musculoskeletal:  Positive for gait problem.   Neurological:  Positive for weakness. Negative for dizziness and headaches.        Current Meds: Refer to nursing home record    PMH:    Medical Problems: reviewed and updated       Diagnosis Date    Acquired hypothyroidism 03/09/2016    Acute renal failure     Arthritis     Asthma     History of cardiovascular stress test 03/23/2016    [facility]    Hypertension     Stroke (cerebrum) (HCC)         Surgical Hx: reviewed and updated   Past Surgical History:   Procedure Laterality Date    CHOLECYSTECTOMY  7/17/2015    HERNIA REPAIR  2005    Dr Perkins         FH: reviewed and updated       Problem Relation Age of Onset    Heart Failure Mother     Heart Disease Mother         CABG    Heart Failure Father         SH: reviewed and updated    reports that he quit smoking about 14 years ago. His smoking use included cigarettes. He has quit using smokeless tobacco. He reports that he does not drink alcohol and does not use drugs.     Objective:  Vitals reviewed, please refer to Nursing home chart     Physical Exam  Constitutional:       General: He is awake. He is not in acute distress.  HENT:      Head: Normocephalic and atraumatic.   Eyes:      Extraocular Movements: Extraocular movements intact.      Pupils: Pupils are equal, round, and reactive to light.   Cardiovascular:      Rate and Rhythm: Normal rate and regular 
clears

## 2025-08-05 ENCOUNTER — OUTSIDE SERVICES (OUTPATIENT)
Dept: INTERNAL MEDICINE CLINIC | Age: 75
End: 2025-08-05
Payer: MEDICARE

## 2025-08-05 DIAGNOSIS — I25.10 ATHSCL HEART DISEASE OF NATIVE CORONARY ARTERY W/O ANG PCTRS: ICD-10-CM

## 2025-08-05 DIAGNOSIS — I50.30 DIASTOLIC CONGESTIVE HEART FAILURE, UNSPECIFIED HF CHRONICITY (HCC): ICD-10-CM

## 2025-08-05 DIAGNOSIS — N40.1 BENIGN PROSTATIC HYPERPLASIA WITH LOWER URINARY TRACT SYMPTOMS, SYMPTOM DETAILS UNSPECIFIED: ICD-10-CM

## 2025-08-05 DIAGNOSIS — I69.391 DYSPHAGIA FOLLOWING CEREBRAL INFARCTION: ICD-10-CM

## 2025-08-05 DIAGNOSIS — I10 ESSENTIAL (PRIMARY) HYPERTENSION: ICD-10-CM

## 2025-08-05 DIAGNOSIS — K57.32 DVTRCLI OF LG INT W/O PERFORATION OR ABSCESS W/O BLEEDING: ICD-10-CM

## 2025-08-05 DIAGNOSIS — E44.1 MILD PROTEIN-CALORIE MALNUTRITION: ICD-10-CM

## 2025-08-05 DIAGNOSIS — N18.30 STAGE 3 CHRONIC KIDNEY DISEASE, UNSPECIFIED WHETHER STAGE 3A OR 3B CKD (HCC): ICD-10-CM

## 2025-08-05 DIAGNOSIS — I48.92 ATRIAL FLUTTER, UNSPECIFIED TYPE (HCC): ICD-10-CM

## 2025-08-05 DIAGNOSIS — D64.9 ANEMIA, UNSPECIFIED TYPE: ICD-10-CM

## 2025-08-05 DIAGNOSIS — Z86.73 HISTORY OF STROKE: Primary | ICD-10-CM

## 2025-08-05 PROCEDURE — 99309 SBSQ NF CARE MODERATE MDM 30: CPT | Performed by: INTERNAL MEDICINE

## 2025-08-05 ASSESSMENT — ENCOUNTER SYMPTOMS
DIARRHEA: 0
ABDOMINAL PAIN: 0
SHORTNESS OF BREATH: 0
VOMITING: 0
NAUSEA: 0

## (undated) DEVICE — COVER,LIGHT HANDLE,FLX,1/PK: Brand: MEDLINE INDUSTRIES, INC.

## (undated) DEVICE — KENDALL 450 SERIES MONITORING FOAM ELECTRODE - RECTANGULAR SHAPE ( 3/PK): Brand: KENDALL

## (undated) DEVICE — TUBING, SUCTION, 1/4" X 10', STRAIGHT: Brand: MEDLINE

## (undated) DEVICE — SPONGE GZ 4IN 4IN 4 PLY N WVN AVANT

## (undated) DEVICE — AIR/WATER CLEANING ADAPTER FOR OLYMPUS® GI ENDOSCOPE: Brand: BULLDOG®

## (undated) DEVICE — 4-PORT MANIFOLD: Brand: NEPTUNE 2

## (undated) DEVICE — Device: Brand: DEFENDO VALVE AND CONNECTOR KIT

## (undated) DEVICE — WIPES SKIN CLOTH READYPREP 9 X 10.5 IN 2% CHLORHEX GLUCONATE CHG PREOP

## (undated) DEVICE — TOWEL,OR,DSP,ST,BLUE,STD,6/PK,12PK/CS: Brand: MEDLINE

## (undated) DEVICE — BLOCK BITE 60FR CAREGUARD

## (undated) DEVICE — BAG SPECIMEN BIOHAZARD 6IN X 9IN

## (undated) DEVICE — YANKAUER,BULB TIP,W/O VENT,RIGID,STERILE: Brand: MEDLINE

## (undated) DEVICE — FORCEPS BX L240CM JAW DIA2.8MM L CAP W/ NDL MIC MESH TOOTH

## (undated) DEVICE — CONTAINER SPEC COLL 960ML POLYPR TRIANG GRAD INTAKE/OUTPUT

## (undated) DEVICE — KIT BEDSIDE REVITAL OX 500ML

## (undated) DEVICE — JELLY,LUBE,STERILE,FLIP TOP,TUBE,4-OZ: Brand: MEDLINE